# Patient Record
Sex: MALE | Race: BLACK OR AFRICAN AMERICAN | NOT HISPANIC OR LATINO | URBAN - METROPOLITAN AREA
[De-identification: names, ages, dates, MRNs, and addresses within clinical notes are randomized per-mention and may not be internally consistent; named-entity substitution may affect disease eponyms.]

---

## 2020-08-01 ENCOUNTER — INPATIENT (INPATIENT)
Facility: HOSPITAL | Age: 45
LOS: 1 days | Discharge: HOME | DRG: 645 | End: 2020-08-03
Payer: COMMERCIAL

## 2020-08-01 VITALS
SYSTOLIC BLOOD PRESSURE: 143 MMHG | HEART RATE: 100 BPM | TEMPERATURE: 98 F | RESPIRATION RATE: 20 BRPM | DIASTOLIC BLOOD PRESSURE: 89 MMHG | OXYGEN SATURATION: 94 %

## 2020-08-01 DIAGNOSIS — F17.200 NICOTINE DEPENDENCE, UNSPECIFIED, UNCOMPLICATED: ICD-10-CM

## 2020-08-01 DIAGNOSIS — J98.59 OTHER DISEASES OF MEDIASTINUM, NOT ELSEWHERE CLASSIFIED: ICD-10-CM

## 2020-08-01 DIAGNOSIS — R06.02 SHORTNESS OF BREATH: ICD-10-CM

## 2020-08-01 DIAGNOSIS — J20.9 ACUTE BRONCHITIS, UNSPECIFIED: ICD-10-CM

## 2020-08-01 DIAGNOSIS — Z29.9 ENCOUNTER FOR PROPHYLACTIC MEASURES, UNSPECIFIED: ICD-10-CM

## 2020-08-01 DIAGNOSIS — Z98.890 OTHER SPECIFIED POSTPROCEDURAL STATES: Chronic | ICD-10-CM

## 2020-08-01 DIAGNOSIS — R91.8 OTHER NONSPECIFIC ABNORMAL FINDING OF LUNG FIELD: ICD-10-CM

## 2020-08-01 LAB
ALBUMIN SERPL ELPH-MCNC: 3.9 G/DL — SIGNIFICANT CHANGE UP (ref 3.4–5)
ALP SERPL-CCNC: 76 U/L — SIGNIFICANT CHANGE UP (ref 40–120)
ALT FLD-CCNC: 26 U/L — SIGNIFICANT CHANGE UP (ref 12–42)
ANION GAP SERPL CALC-SCNC: 8 MMOL/L — LOW (ref 9–16)
APTT BLD: 33.4 SEC — SIGNIFICANT CHANGE UP (ref 27.5–35.5)
AST SERPL-CCNC: 16 U/L — SIGNIFICANT CHANGE UP (ref 15–37)
BASOPHILS # BLD AUTO: 0.04 K/UL — SIGNIFICANT CHANGE UP (ref 0–0.2)
BASOPHILS NFR BLD AUTO: 0.5 % — SIGNIFICANT CHANGE UP (ref 0–2)
BILIRUB SERPL-MCNC: 0.3 MG/DL — SIGNIFICANT CHANGE UP (ref 0.2–1.2)
BUN SERPL-MCNC: 15 MG/DL — SIGNIFICANT CHANGE UP (ref 7–23)
CALCIUM SERPL-MCNC: 9.1 MG/DL — SIGNIFICANT CHANGE UP (ref 8.5–10.5)
CHLORIDE SERPL-SCNC: 107 MMOL/L — SIGNIFICANT CHANGE UP (ref 96–108)
CO2 SERPL-SCNC: 28 MMOL/L — SIGNIFICANT CHANGE UP (ref 22–31)
CREAT SERPL-MCNC: 1.28 MG/DL — SIGNIFICANT CHANGE UP (ref 0.5–1.3)
CRP SERPL-MCNC: 0.3 MG/DL — SIGNIFICANT CHANGE UP (ref 0–0.9)
D DIMER BLD IA.RAPID-MCNC: <187 NG/ML DDU — SIGNIFICANT CHANGE UP
EOSINOPHIL # BLD AUTO: 0.5 K/UL — SIGNIFICANT CHANGE UP (ref 0–0.5)
EOSINOPHIL NFR BLD AUTO: 6.8 % — HIGH (ref 0–6)
GLUCOSE SERPL-MCNC: 101 MG/DL — HIGH (ref 70–99)
HCT VFR BLD CALC: 44.8 % — SIGNIFICANT CHANGE UP (ref 39–50)
HGB BLD-MCNC: 15.5 G/DL — SIGNIFICANT CHANGE UP (ref 13–17)
IMM GRANULOCYTES NFR BLD AUTO: 0.3 % — SIGNIFICANT CHANGE UP (ref 0–1.5)
INR BLD: 1.11 — SIGNIFICANT CHANGE UP (ref 0.88–1.16)
LYMPHOCYTES # BLD AUTO: 3.18 K/UL — SIGNIFICANT CHANGE UP (ref 1–3.3)
LYMPHOCYTES # BLD AUTO: 43.3 % — SIGNIFICANT CHANGE UP (ref 13–44)
MCHC RBC-ENTMCNC: 29.9 PG — SIGNIFICANT CHANGE UP (ref 27–34)
MCHC RBC-ENTMCNC: 34.6 GM/DL — SIGNIFICANT CHANGE UP (ref 32–36)
MCV RBC AUTO: 86.3 FL — SIGNIFICANT CHANGE UP (ref 80–100)
MONOCYTES # BLD AUTO: 0.71 K/UL — SIGNIFICANT CHANGE UP (ref 0–0.9)
MONOCYTES NFR BLD AUTO: 9.7 % — SIGNIFICANT CHANGE UP (ref 2–14)
NEUTROPHILS # BLD AUTO: 2.9 K/UL — SIGNIFICANT CHANGE UP (ref 1.8–7.4)
NEUTROPHILS NFR BLD AUTO: 39.4 % — LOW (ref 43–77)
NRBC # BLD: 0 /100 WBCS — SIGNIFICANT CHANGE UP (ref 0–0)
NT-PROBNP SERPL-SCNC: 19 PG/ML — SIGNIFICANT CHANGE UP
PCO2 BLDV: 43 MMHG — SIGNIFICANT CHANGE UP (ref 41–51)
PH BLDV: 7.4 — SIGNIFICANT CHANGE UP (ref 7.32–7.43)
PLATELET # BLD AUTO: 256 K/UL — SIGNIFICANT CHANGE UP (ref 150–400)
PO2 BLDV: 39 MMHG — SIGNIFICANT CHANGE UP (ref 35–40)
POTASSIUM SERPL-MCNC: 4.3 MMOL/L — SIGNIFICANT CHANGE UP (ref 3.5–5.3)
POTASSIUM SERPL-SCNC: 4.3 MMOL/L — SIGNIFICANT CHANGE UP (ref 3.5–5.3)
PROT SERPL-MCNC: 7.3 G/DL — SIGNIFICANT CHANGE UP (ref 6.4–8.2)
PROTHROM AB SERPL-ACNC: 13 SEC — SIGNIFICANT CHANGE UP (ref 10.6–13.6)
RBC # BLD: 5.19 M/UL — SIGNIFICANT CHANGE UP (ref 4.2–5.8)
RBC # FLD: 14 % — SIGNIFICANT CHANGE UP (ref 10.3–14.5)
SAO2 % BLDV: 75 % — SIGNIFICANT CHANGE UP
SARS-COV-2 RNA SPEC QL NAA+PROBE: SIGNIFICANT CHANGE UP
SODIUM SERPL-SCNC: 143 MMOL/L — SIGNIFICANT CHANGE UP (ref 132–145)
TROPONIN I SERPL-MCNC: <0.017 NG/ML — LOW (ref 0.02–0.06)
WBC # BLD: 7.35 K/UL — SIGNIFICANT CHANGE UP (ref 3.8–10.5)
WBC # FLD AUTO: 7.35 K/UL — SIGNIFICANT CHANGE UP (ref 3.8–10.5)

## 2020-08-01 PROCEDURE — 71260 CT THORAX DX C+: CPT | Mod: 26

## 2020-08-01 PROCEDURE — 99223 1ST HOSP IP/OBS HIGH 75: CPT

## 2020-08-01 PROCEDURE — 71045 X-RAY EXAM CHEST 1 VIEW: CPT | Mod: 26

## 2020-08-01 PROCEDURE — 93010 ELECTROCARDIOGRAM REPORT: CPT

## 2020-08-01 PROCEDURE — 99223 1ST HOSP IP/OBS HIGH 75: CPT | Mod: GC

## 2020-08-01 PROCEDURE — 99254 IP/OBS CNSLTJ NEW/EST MOD 60: CPT

## 2020-08-01 PROCEDURE — 71250 CT THORAX DX C-: CPT | Mod: 26

## 2020-08-01 PROCEDURE — 99285 EMERGENCY DEPT VISIT HI MDM: CPT

## 2020-08-01 PROCEDURE — 99254 IP/OBS CNSLTJ NEW/EST MOD 60: CPT | Mod: GC

## 2020-08-01 RX ORDER — PANTOPRAZOLE SODIUM 20 MG/1
40 TABLET, DELAYED RELEASE ORAL
Refills: 0 | Status: DISCONTINUED | OUTPATIENT
Start: 2020-08-01 | End: 2020-08-03

## 2020-08-01 RX ORDER — CEFTRIAXONE 500 MG/1
1000 INJECTION, POWDER, FOR SOLUTION INTRAMUSCULAR; INTRAVENOUS ONCE
Refills: 0 | Status: COMPLETED | OUTPATIENT
Start: 2020-08-01 | End: 2020-08-01

## 2020-08-01 RX ORDER — IPRATROPIUM/ALBUTEROL SULFATE 18-103MCG
3 AEROSOL WITH ADAPTER (GRAM) INHALATION EVERY 6 HOURS
Refills: 0 | Status: DISCONTINUED | OUTPATIENT
Start: 2020-08-01 | End: 2020-08-03

## 2020-08-01 RX ORDER — AZITHROMYCIN 500 MG/1
500 TABLET, FILM COATED ORAL ONCE
Refills: 0 | Status: COMPLETED | OUTPATIENT
Start: 2020-08-01 | End: 2020-08-01

## 2020-08-01 RX ORDER — IPRATROPIUM/ALBUTEROL SULFATE 18-103MCG
3 AEROSOL WITH ADAPTER (GRAM) INHALATION ONCE
Refills: 0 | Status: COMPLETED | OUTPATIENT
Start: 2020-08-01 | End: 2020-08-01

## 2020-08-01 RX ADMIN — CEFTRIAXONE 100 MILLIGRAM(S): 500 INJECTION, POWDER, FOR SOLUTION INTRAMUSCULAR; INTRAVENOUS at 02:52

## 2020-08-01 RX ADMIN — Medication 3 MILLILITER(S): at 00:55

## 2020-08-01 RX ADMIN — Medication 125 MILLIGRAM(S): at 00:55

## 2020-08-01 RX ADMIN — AZITHROMYCIN 255 MILLIGRAM(S): 500 TABLET, FILM COATED ORAL at 02:51

## 2020-08-01 NOTE — CONSULT NOTE ADULT - ASSESSMENT
44 yr old male with PMHx of PUD, chronic smoker (30 pack yrs) admitted for acute bronchitis with dry cough/wheezing/dyspnea, improved with 125 solumedrol in ER and subsequent nebulization. We have been consulted for incidental finding of right paratracheal mass.

## 2020-08-01 NOTE — ED PROVIDER NOTE - PHYSICAL EXAMINATION
Vital signs reviewed and are as documented.  GENERAL: no acute distress, no conversational dyspnea, no lethargy  HEAD: Atraumatic  ENT: performed visually from a distance to limit exposure risk to COVID 19; no drooling, no muffled voice, no trismus  NECK: trachea midline, no visible masses, no visible JVD  CARDIO: auscultation deferred to limit exposure risk to COVID 19, HR as documented in vital signs  RESPIRATORY: no conversational dyspnea, No respiratory distress.  No visible increased work of breathing,  auscultation deferred to limit exposure risk to COVID 19  EXTREMITIES: moves all extremities spontaneously  NEURO: Awake and alert.  No gross motor deficits.  Ambulates independently.  PSYCH: calm, cooperative

## 2020-08-01 NOTE — H&P ADULT - PROBLEM SELECTOR PLAN 3
F:  E: replete PRN  N: regular   Dvt ppx:   GI ppx: None   DISPO: [facility]   Code status: Full F:  E: replete PRN  N: regular   Dvt ppx: SCD  GI ppx: None   DISPO: [facility]   Code status: Full Pt has extensive smoking history, found to have mild centrilobular emphysema on CT scan in setting of SOB. Pt never been diagnosed with COPD, COPD exacerbation is unlikely. Additionally, multiple lung nodules found on CT.   -outpatient followup with pulm

## 2020-08-01 NOTE — CONSULT NOTE ADULT - PROBLEM SELECTOR RECOMMENDATION 2
smoking cessation counselling done.   - early paraseptal emphysema changes seen on CT chest.   - outpatient PFTs.

## 2020-08-01 NOTE — H&P ADULT - PROBLEM SELECTOR PLAN 1
CT chest showed large R sided mediastinal mass causing leftward deviation of the trachea, without tracheal stenosis. Pt has had no trouble swallowing. In setting of extensive smoking history, mass is concerning for neoplastic process  -CT surg consulted, who recommended EGD   -GI consult for possible EGD with EUS to see if mass arises from esophagus CT chest showed large R sided mediastinal mass causing leftward deviation of the trachea, without tracheal stenosis. Pt has had no trouble swallowing. In setting of extensive smoking history, mass is concerning for neoplastic process  -CT surg consulted, who recommended EGD   -GI consulted to see if mass arises from esophagus, will see pt, recs appreciated

## 2020-08-01 NOTE — H&P ADULT - NSHPLABSRESULTS_GEN_ALL_CORE
LABS:                         15.5   7.35  )-----------( 256      ( 01 Aug 2020 01:07 )             44.8     08-01    143  |  107  |  15  ----------------------------<  101<H>  4.3   |  28  |  1.28    Ca    9.1      01 Aug 2020 01:07    TPro  7.3  /  Alb  3.9  /  TBili  0.3  /  DBili  x   /  AST  16  /  ALT  26  /  AlkPhos  76  08-01    PT/INR - ( 01 Aug 2020 01:07 )   PT: 13.0 sec;   INR: 1.11          PTT - ( 01 Aug 2020 01:07 )  PTT:33.4 sec    CARDIAC MARKERS ( 01 Aug 2020 01:07 )  <0.017 ng/mL / x     / x     / x     / x          Serum Pro-Brain Natriuretic Peptide: 19 pg/mL (08-01 @ 01:07)        RADIOLOGY, EKG & ADDITIONAL TESTS: Reviewed.

## 2020-08-01 NOTE — ED PROVIDER NOTE - ATTENDING CONTRIBUTION TO CARE
Pt is a 45yo M with a h/o PUD who p/w 1 week of cough (non-productive) and SOB.  No relief with OTC medication.  Treated by outside  for Bronchitis - no relief.  SOB/DORANTES persists.   +Active smoker.  No recent travel or sick contacts.   ROS - Denies fever, chills, headache, dizziness, sore throat, syncope, CP, palpitations, hemoptysis, calf pain or LE swelling  PE - agree with PA exam as above.   CXR - concerning for mass vs consolidation to RLL - will obtain CT to better delineate.   Dimer negative, making PE less likely.   A/P - CT demonstrates mediastinal mass.  Concerning for oncological process.  Will admit to medicine at [facility] for further work up and oxygen therapy for persistent hypoxia.

## 2020-08-01 NOTE — H&P ADULT - PROBLEM SELECTOR PLAN 4
F:  E: replete PRN  N: regular   Dvt ppx: SCD  GI ppx: None   DISPO: [facility]   Code status: Full F:  E: replete PRN  N: DASH/TLC    Dvt ppx: SCD  GI ppx: protonix 40qd  DISPO: [facility]   Code status: Full Pt has history of extensive smoking, though now smokes 3-4 cigarettes a day. At this time, does not want nicotine patch.

## 2020-08-01 NOTE — CONSULT NOTE ADULT - SUBJECTIVE AND OBJECTIVE BOX
HPI:  44M smoker w/ hx of PUD p/w SOB.  Patient reports that he had 2 weeks prior to admission, he began to have trouble breathing at night.  No inciting factor or trauma.  As symptoms persisted, he was evaluated at urgent care and started on medications for bronchitis and stomach medication for the nocturnal chest burning and tightness.  Symptoms improved but on the day of presentation, he felt like symptoms worsen with dyspnea.  He went to the ED and CT was notable for solid mass in upper R mediastinum measuring up to 6.9 cm causing mild mass effect and leftward deviation of trachea without evidence of tracheal stenosis. Mild bilateral bronchial wall thickening suggesting bronchitis. Mild paraseptal and centrilobular emphysema. Small lung nodules.   This morning, patient feels well and no further episode of dyspnea.  Denies fever, chill, cp, sob, abd pain, nausea, vomiting, change in BM, melena or hematochezia.  Hx of hematochezia s/p EGD colonoscopy around 20 yr ago and thought to have PUD.  he reports taking medication for 2 yr before stopping.  Denies fam hx of cancer.  Smokes 1PPD, with 30 pack year history.    Allergies    No Known Drug Allergies  shellfish (Anaphylaxis)    Intolerances    Home Medications:    MEDICATIONS:  MEDICATIONS  (STANDING):    MEDICATIONS  (PRN):  albuterol/ipratropium for Nebulization 3 milliLiter(s) Nebulizer every 6 hours PRN Shortness of Breath    PAST MEDICAL & SURGICAL HISTORY:  PUD (peptic ulcer disease)  Status post labral repair of shoulder    FAMILY HISTORY:  No pertinent family history in first degree relatives  denies fam hx of cancer     SOCIAL HISTORY:  Tobacco: 30 pack year, 1PPD  Alcohol:1 beer qmo  Illicit Drugs:denies    REVIEW OF SYSTEMS:  All other 10 review of systems is negative except as indicated in HPI    Vital Signs Last 24 Hrs  T(C): 36.4 (01 Aug 2020 07:51), Max: 37 (01 Aug 2020 06:49)  T(F): 97.5 (01 Aug 2020 07:51), Max: 98.6 (01 Aug 2020 06:49)  HR: 70 (01 Aug 2020 07:51) (70 - 100)  BP: 151/93 (01 Aug 2020 07:51) (142/90 - 154/90)  BP(mean): --  RR: 18 (01 Aug 2020 07:51) (16 - 20)  SpO2: 95% (01 Aug 2020 07:51) (94% - 98%)      PHYSICAL EXAM:    General: Well developed; well nourished; in no acute distress  Eyes: moist conjunctivae, sclerae anicteric  HENT: Moist mucous membranes, tongue midline  Neck: Trachea midline, supple  Lungs: Normal respiratory effort and no intercostal retractions  Cardiovascular: RRR, S1S2  Abdomen: Soft, non-tender non-distended; Normal bowel sounds; No rebound or guarding  Extremities: Normal range of motion, No clubbing, cyanosis or edema  Neurological: Alert and oriented x3, nonfocal exam  Skin: Warm and dry. No obvious rash    LABS:                        15.5   7.35  )-----------( 256      ( 01 Aug 2020 01:07 )             44.8     08-01    143  |  107  |  15  ----------------------------<  101<H>  4.3   |  28  |  1.28    Ca    9.1      01 Aug 2020 01:07    TPro  7.3  /  Alb  3.9  /  TBili  0.3  /  DBili  x   /  AST  16  /  ALT  26  /  AlkPhos  76  08-01        PT/INR - ( 01 Aug 2020 01:07 )   PT: 13.0 sec;   INR: 1.11          PTT - ( 01 Aug 2020 01:07 )  PTT:33.4 sec    RADIOLOGY & ADDITIONAL STUDIES:   Reviewed

## 2020-08-01 NOTE — CONSULT NOTE ADULT - SUBJECTIVE AND OBJECTIVE BOX
PULMONARY SERVICE INITIAL CONSULT NOTE    HPI:  44M 30+ pack-year smoker [facility] peptic ulcer disease presents with shortness of breath and dry cough for 1-2 weeks. 2 weeks prior to admission, he began to have trouble breathing at night. Mucinex and Nyquil provided mild relief. Symptoms persisted, and he developed a dry cough 1 week prior to admission. Went to urgent care and was treated for bronchitis with cough syrup. Dry cough and SOB persisted. 1 day prior to admission, his SOB and dry cough worsened, so he brought himself to hospital.    + fatigue, loss of appetite x 1 week. Denies dysphagia. Denies fever/chills, headache, dizziness, hemoptysis. No weight loss. No CP, n/v/d, dysuria, rash, LE swelling.     ED vitals: T 98, , /89, RR 20 94% on RA   ED labs s/f: WBC 7.3, D dimer <187, trops neg, CMP wnl. VBG pH 7.4, pCO2 43, pO2 39  ED imaging: CT chest - solid mass in upper R mediastinum measuring up to 6.9 cm causing mild mass effect and leftward deviation of trachea without evidence of tracheal stenosis. Mild bilateral bronchial wall thickening suggesting bronchitis. Mild paraseptal and centrilobular emphysema. Small lung nodules.   In ED, received: 500 mg azithro, 1g ceftriaxone, duoneb x 1, 125 mg IV solumedrol. CT surg consulted. (01 Aug 2020 07:39)      REVIEW OF SYSTEMS:  Constitutional: No fever, weight loss or fatigue  Eyes: No eye pain, visual disturbances, or discharge  ENMT:  No difficulty hearing, tinnitus, vertigo; No sinus or throat pain  Neck: No pain, stiffness or neck swelling  Respiratory: see HPI  Cardiovascular: No chest pain, palpitations, dizziness or leg swelling  Gastrointestinal: No abdominal or epigastric pain. No nausea, vomiting or hematemesis; No diarrhea or constipation. No melena or hematochezia.  Genitourinary: No dysuria, frequency, hematuria or incontinence  Neurological: No headaches, memory loss, loss of strength, numbness or tremors  Skin: No itching, burning, rashes or lesions   Lymph Nodes: No enlarged glands  Endocrine: No heat or cold intolerance; No hair loss  Musculoskeletal: No joint pain or swelling; No muscle, back or extremity pain  Psychiatric: No depression, anxiety, mood swings or difficulty sleeping  Heme/Lymph: No easy bruising or bleeding gums  Allergy and Immunologic: No hives or eczema    PAST MEDICAL & SURGICAL HISTORY:  PUD (peptic ulcer disease)  Status post labral repair of shoulder      FAMILY HISTORY:  No pertinent family history in first degree relatives      SOCIAL HISTORY:  Smoking Status: [ ] Current, [ ] Former, [ ] Never  Pack Years:    MEDICATIONS:  Pulmonary:  albuterol/ipratropium for Nebulization 3 milliLiter(s) Nebulizer every 6 hours PRN    Antimicrobials:    Anticoagulants:    Onc:    GI/:  pantoprazole    Tablet 40 milliGRAM(s) Oral before breakfast    Endocrine:    Cardiac:    Other Medications:      Allergies    No Known Drug Allergies  shellfish (Anaphylaxis)    Intolerances        Vital Signs Last 24 Hrs  T(C): 36.9 (01 Aug 2020 16:33), Max: 37 (01 Aug 2020 06:49)  T(F): 98.4 (01 Aug 2020 16:33), Max: 98.6 (01 Aug 2020 06:49)  HR: 71 (01 Aug 2020 16:33) (70 - 100)  BP: 153/88 (01 Aug 2020 16:33) (142/90 - 154/90)  BP(mean): --  RR: 18 (01 Aug 2020 16:33) (16 - 20)  SpO2: 94% (01 Aug 2020 16:33) (94% - 98%)        PHYSICAL EXAM:  Constitutional: WDWN  Head: NC/AT  EENT: PERRL, anicteric sclera; oropharynx clear, MMM  Neck: supple, no appreciable JVD  Respiratory: CTA B/L; no W/R/R  Cardiovascular: +S1/S2, RRR  Gastrointestinal: soft, NT/ND; +BSx4  Extremities: WWP; no edema, clubbing or cyanosis  Vascular: 2+ radial, DP/PT pulses B/L  Neurological: AAOx3; no focal deficits    LABS:      CBC Full  -  ( 01 Aug 2020 01:07 )  WBC Count : 7.35 K/uL  RBC Count : 5.19 M/uL  Hemoglobin : 15.5 g/dL  Hematocrit : 44.8 %  Platelet Count - Automated : 256 K/uL  Mean Cell Volume : 86.3 fl  Mean Cell Hemoglobin : 29.9 pg  Mean Cell Hemoglobin Concentration : 34.6 gm/dL  Auto Neutrophil # : 2.90 K/uL  Auto Lymphocyte # : 3.18 K/uL  Auto Monocyte # : 0.71 K/uL  Auto Eosinophil # : 0.50 K/uL  Auto Basophil # : 0.04 K/uL  Auto Neutrophil % : 39.4 %  Auto Lymphocyte % : 43.3 %  Auto Monocyte % : 9.7 %  Auto Eosinophil % : 6.8 %  Auto Basophil % : 0.5 %    08-01    143  |  107  |  15  ----------------------------<  101<H>  4.3   |  28  |  1.28    Ca    9.1      01 Aug 2020 01:07    TPro  7.3  /  Alb  3.9  /  TBili  0.3  /  DBili  x   /  AST  16  /  ALT  26  /  AlkPhos  76  08-01    PT/INR - ( 01 Aug 2020 01:07 )   PT: 13.0 sec;   INR: 1.11          PTT - ( 01 Aug 2020 01:07 )  PTT:33.4 sec                  RADIOLOGY & ADDITIONAL STUDIES: PULMONARY SERVICE INITIAL CONSULT NOTE    HPI:  44M 30+ pack-year smoker [facility] peptic ulcer disease presents with shortness of breath and dry cough for 1-2 weeks. 2 weeks prior to admission, he began to have trouble breathing at night. Mucinex and Nyquil provided mild relief. Symptoms persisted, and he developed a dry cough 1 week prior to admission. Went to urgent care and was treated for bronchitis with cough syrup. Dry cough and SOB persisted. 1 day prior to admission, his SOB and dry cough worsened, so he brought himself to hospital.  + fatigue, loss of appetite x 1 week. Denies dysphagia. Denies fever/chills, headache, dizziness, hemoptysis. No weight loss. No CP, n/v/d, dysuria, rash, LE swelling.     ED vitals: T 98, , /89, RR 20 94% on RA   ED labs s/f: WBC 7.3, D dimer <187, trops neg, CMP wnl. VBG pH 7.4, pCO2 43, pO2 39    On my evaluation, the patient reports to be significantly better after nebulizer treatments. wheezing has completely resolved and no more dyspnea. he still has mild dry cough. he denies any fever/chills/dysphagia/weight loss. saturating well on RA.       REVIEW OF SYSTEMS:  Constitutional: No fever, weight loss or fatigue  Eyes: No eye pain, visual disturbances, or discharge  ENMT:  No difficulty hearing, tinnitus, vertigo; No sinus or throat pain  Neck: No pain, stiffness or neck swelling  Respiratory: see HPI  Cardiovascular: No chest pain, palpitations, dizziness or leg swelling  Gastrointestinal: No abdominal or epigastric pain. No nausea, vomiting or hematemesis; No diarrhea or constipation. No melena or hematochezia.  Genitourinary: No dysuria, frequency, hematuria or incontinence  Neurological: No headaches, memory loss, loss of strength, numbness or tremors  Skin: No itching, burning, rashes or lesions   Lymph Nodes: No enlarged glands  Endocrine: No heat or cold intolerance; No hair loss  Musculoskeletal: No joint pain or swelling; No muscle, back or extremity pain  Psychiatric: No depression, anxiety, mood swings or difficulty sleeping  Heme/Lymph: No easy bruising or bleeding gums  Allergy and Immunologic: No hives or eczema    PAST MEDICAL & SURGICAL HISTORY:  PUD (peptic ulcer disease)  Status post labral repair of shoulder      FAMILY HISTORY:  No pertinent family history in first degree relatives      SOCIAL HISTORY:  Smoking Status: [x] Current, [ ] Former, [ ] Never  Pack Years: 30 pack yrs    MEDICATIONS:  Pulmonary:  albuterol/ipratropium for Nebulization 3 milliLiter(s) Nebulizer every 6 hours PRN    Antimicrobials:    Anticoagulants:    Onc:    GI/:  pantoprazole    Tablet 40 milliGRAM(s) Oral before breakfast    Endocrine:    Cardiac:    Other Medications:      Allergies    No Known Drug Allergies  shellfish (Anaphylaxis)    Intolerances        Vital Signs Last 24 Hrs  T(C): 36.9 (01 Aug 2020 16:33), Max: 37 (01 Aug 2020 06:49)  T(F): 98.4 (01 Aug 2020 16:33), Max: 98.6 (01 Aug 2020 06:49)  HR: 71 (01 Aug 2020 16:33) (70 - 100)  BP: 153/88 (01 Aug 2020 16:33) (142/90 - 154/90)  BP(mean): --  RR: 18 (01 Aug 2020 16:33) (16 - 20)  SpO2: 94% (01 Aug 2020 16:33) (94% - 98%)        PHYSICAL EXAM:  Constitutional: WDWN  Head: NC/AT  EENT: PERRL, anicteric sclera; oropharynx clear, MMM  Neck: supple, no appreciable JVD  Respiratory: CTA B/L; no W/R/R  Cardiovascular: +S1/S2, RRR  Gastrointestinal: soft, NT/ND; +BSx4  Extremities: WWP; no edema, clubbing or cyanosis  Vascular: 2+ radial, DP/PT pulses B/L  Neurological: AAOx3; no focal deficits    LABS:      CBC Full  -  ( 01 Aug 2020 01:07 )  WBC Count : 7.35 K/uL  RBC Count : 5.19 M/uL  Hemoglobin : 15.5 g/dL  Hematocrit : 44.8 %  Platelet Count - Automated : 256 K/uL  Mean Cell Volume : 86.3 fl  Mean Cell Hemoglobin : 29.9 pg  Mean Cell Hemoglobin Concentration : 34.6 gm/dL  Auto Neutrophil # : 2.90 K/uL  Auto Lymphocyte # : 3.18 K/uL  Auto Monocyte # : 0.71 K/uL  Auto Eosinophil # : 0.50 K/uL  Auto Basophil # : 0.04 K/uL  Auto Neutrophil % : 39.4 %  Auto Lymphocyte % : 43.3 %  Auto Monocyte % : 9.7 %  Auto Eosinophil % : 6.8 %  Auto Basophil % : 0.5 %    08-01    143  |  107  |  15  ----------------------------<  101<H>  4.3   |  28  |  1.28    Ca    9.1      01 Aug 2020 01:07    TPro  7.3  /  Alb  3.9  /  TBili  0.3  /  DBili  x   /  AST  16  /  ALT  26  /  AlkPhos  76  08-01    PT/INR - ( 01 Aug 2020 01:07 )   PT: 13.0 sec;   INR: 1.11          PTT - ( 01 Aug 2020 01:07 )  PTT:33.4 sec                  RADIOLOGY & ADDITIONAL STUDIES:

## 2020-08-01 NOTE — CONSULT NOTE ADULT - ASSESSMENT
44M smoker w/ hx of PUD p/w SOB found to have right mediastinum mass.  GI consulted for diagnostic EGD and EUS.    #Right mediastinum mass  Patient with new lesion found in the setting of dyspnea and smoking hx.  No evidence of thoracic lymphadenopathy.  Ddx is broad and would proceed with diagnostic EGD/EUS on Monday to further evaluate lesion.  -Keep NPO on Sunday MN  -Tentative plan for EGD/EUS on Monday    Case d/w biliary attg 44M smoker w/ hx of PUD p/w SOB found to have right mediastinum mass.  GI consulted for diagnostic EGD and EUS.    #Right mediastinal mass  Patient with new lesion found in the setting of dyspnea and smoking hx.  No evidence of thoracic lymphadenopathy.  Ddx is broad and includes inflammatory and malignancy.  Will plan for diagnostic EGD/EUS to further evaluate lesion.  -Keep NPO on Sunday MN  -Tentative plan for EGD/EUS next week    Case d/w biliary attg 44M smoker w/ hx of PUD p/w SOB found to have right mediastinum mass.  GI consulted for diagnostic EGD and EUS.    #Right mediastinal mass  Patient with new lesion found in the setting of dyspnea and smoking hx.  No evidence of thoracic lymphadenopathy.  Ddx is broad and includes inflammatory and malignancy.  Will plan for diagnostic EGD/EUS to further evaluate lesion.  -Keep NPO on Sunday MN  -Tentative plan for EGD/EUS next week    Case d/w svc attg and biliary attg

## 2020-08-01 NOTE — ED ADULT NURSE NOTE - OBJECTIVE STATEMENT
45 yo M c/o SOB and chest tightness. Pt states the exacerbation of symptoms started around 22:00; he has had dry cough and intermittent SOB with chest tightness x 1 week. Recently treated for bronchitis. Denies CP, SOB, N/V/D, headache, dizziness, fever/chills, numbness/tingling, change in bowel or bladder habits. Pt speaking in full complete sentences, ambulatory with steady gait.

## 2020-08-01 NOTE — CONSULT NOTE ADULT - SUBJECTIVE AND OBJECTIVE BOX
This patient was not seen or examined.     I was contacted by a [facility] ED provider Mr. Matt regarding Mr. Bear. In short, he is 45 yo M who presented to the ED with shortness of breath going on for about 1 week. An x-ray was obtained which was abnormal, showing a large mass in the right paratracheal region. CT was obtained showing a right paratracheal/middle mediastinal mass that extends into the neck. His trachea is wide open and there is some slight deviation to the left. His labs are normal and his COVID test is negative.    He has a history of PUD and smoking. I was being consulted for transfer and admission of this patient. This patient was not seen or examined.     I was contacted by a [facility] ED provider Mr. Leavitt regarding Mr. Bear. In short, he is 45 yo M who presented to the ED with shortness of breath for about 1 week. An x-ray was obtained which was abnormal, showing a large mass in the right paratracheal region. CT was obtained showing a right paratracheal/middle mediastinal mass that extends into the neck. His trachea is wide open and there is some slight deviation to the left. He has no other complaints. His labs are normal and his COVID test is negative.    He has a history of PUD and smoking. I was being consulted for transfer and admission of this patient.

## 2020-08-01 NOTE — H&P ADULT - NSHPPHYSICALEXAM_GEN_ALL_CORE
Vital Signs Last 12 Hrs  T(F): 97.5 (08-01-20 @ 07:51), Max: 98.6 (08-01-20 @ 06:49)  HR: 70 (08-01-20 @ 07:51) (70 - 100)  BP: 151/93 (08-01-20 @ 07:51) (142/90 - 154/90)  BP(mean): --  RR: 18 (08-01-20 @ 07:51) (16 - 20)  SpO2: 95% (08-01-20 @ 07:51) (94% - 98%)    PHYSICAL EXAM:  Constitutional: NAD, comfortable in bed.  HEENT: NC/AT, PERRLA, EOMI, no conjunctival pallor or scleral icterus, MMM. No muffled voice or trismus  Neck: Supple, no JVD. Trachea midline, no enlarged lymph nodes palpated, no mass palpated  Respiratory: CTA B/L. No w/r/r.   Cardiovascular: RRR, normal S1 and S2, no m/r/g.   Gastrointestinal: +BS, soft NTND, no guarding or rebound tenderness, no palpable masses   Extremities: wwp; no cyanosis, clubbing or edema.   Vascular: Pulses equal and strong throughout.   Neurological: AAOx3, no CN deficits, strength and sensation intact throughout.   Skin: No gross skin abnormalities or rashes

## 2020-08-01 NOTE — CONSULT NOTE ADULT - ATTENDING COMMENTS
Seen and examined by me. Feels well now - the positional dyspnea and wheezing have resolved. Awaiting EUS and biopsy on Monday.

## 2020-08-01 NOTE — H&P ADULT - NSHPSOCIALHISTORY_GEN_ALL_CORE
Currently smokes 1/2 pack a day for 10 years. Used to smoke 2 pk/day for 15 years. Daily marijuana user. Denies IVDU, other drug use. Currently smokes 3-4 cigarrettes for 10 years. Used to smoke 2 pk/day for 15 years. Daily marijuana user. Denies IVDU, other drug use. Lives in apartment with roommate.

## 2020-08-01 NOTE — H&P ADULT - PROBLEM SELECTOR PLAN 5
F:  E: replete PRN  N: DASH/TLC    Dvt ppx: SCD  GI ppx: protonix 40qd  DISPO: [facility]   Code status: Full

## 2020-08-01 NOTE — ED PROVIDER NOTE - OBJECTIVE STATEMENT
43 y/o M with PMH of PUD presents to the ED with 1 week of a dry cough and SOB. His symptoms have been persistent despite OTC cold medication so he went to Urgent Care 3 days ago where he was diagnosed with presumed bronchitis (no CXR) and prescribed Bromfed for his symptoms. He has been taking the Bromfed as prescribed, but still has persistent SOB so he came here for further evaluation. He smokes a half pack of cigarettes per day. He is unaware of any recent exposure to any COVID + individuals.    Denies fever, chills, headache, dizziness, sore throat, syncope, CP, palpitations, hemoptysis, calf pain or LE swelling

## 2020-08-01 NOTE — H&P ADULT - ASSESSMENT
44M extensive smoking history presents with persistent SOB and dry cough for 1 week, found to have large mass in R paratracheal region, currently not SOB or hypoxic.

## 2020-08-01 NOTE — CONSULT NOTE ADULT - PROBLEM SELECTOR RECOMMENDATION 3
- patient's episode of wheezing/dyspnea/dry cough was likely acute bronchitis and is resolved now. not related to mediastinal mass.   - cont PRN nebulizers. hold off on any inhalers till PFTs done outpatient.     We will continue to follow peripherally. Please call pulmonary fellow pager if acute dyspnea develops.     The patient was s/e/d with Dr Boyd.

## 2020-08-01 NOTE — CONSULT NOTE ADULT - ASSESSMENT
Mr. Bear is a 45 yo M presented with shortness of breath and a right paratracheal mass that extends into his middle mediastinum. Given its location it may be a duplication cyst of the esophagus or airway. Given his smoking history is may be a primary malignancy or metastatic lesion. It may be an enlarged lymph node from an inflammatory process. He has multiple small subcentimeter nodules in his lungs bilaterally which may or may not be lesions related to this primary mass. Unfortunately, I could not review any previous imaging.     Prior to any surgical resection, this mass needs to be characterized and if it is a malignancy, he needs to be appropriately staged. My first test would be an EGD with EUS to see if it does originate from the esophagus. If it appears to be a duplication cyst of the airway or esophagus, I would not biopsy it. That could lead to infection or a more difficult surgical resection. If it appears to be a malignancy, we will need a tissue diagnosis and staging to rule out metastatic disease in the setting of this mediastinal mass and noncalcified lung nodules. Therefore, it is unclear if and when he will need surgery. A surgical admission at this time is not warranted if admission is necessary but we would be happy to follow along as a consulting service. If he does not need admission to the hospital, he should be set up with an outpatient appointment in our clinic [[facility] [phone]].     Thank you for the consult and including me in the care of this patient.     As this was a phone call, the patient was not seen or examined.     Rizwan Rey MD  Attending Thoracic Surgeon  [facility] Mr. Bear is a 43 yo M presented with shortness of breath and a right paratracheal mass that extends into his middle mediastinum. He has a significant smoking history. The differential diagnosis for the mass is broad including but not limited to a duplication cyst of the esophagus or airway, a primary malignancy, a metastatic lesion, a lymph node or a parthyroid lesion. Given the location, clinical history and diagnostic tests so far, some diagnoses are more likely than others.     He has multiple small subcentimeter nodules in his lungs bilaterally which may or may not be lesions related to this primary mass. Unfortunately, I could not review any previous imaging.     Prior to any surgical resection, this mass needs to be characterized and if it is a malignancy, he needs to be appropriately staged. My first test would be an EGD with EUS to see if it does originate from the esophagus. If it appears to be a duplication cyst of the airway or esophagus, I would not biopsy it. That could lead to infection or a more difficult surgical resection. An MRI would be helpful as well to see its relationship to other structures. He may need a bronchoscopy as well based on the results of these tests.     If it appears to be a malignancy, we will need a PET scan to rule out metastatic disease in the setting of this mediastinal mass and noncalcified lung nodules. Therefore, it is unclear if and when he will need surgery. A surgical admission at this time is not warranted, but if admission is necessary, I would recommend admission to a medical service and we will follow along as a consulting service. If he does not need admission to the hospital, he should be set up with an outpatient appointment in our clinic [[facility] [phone]].     Thank you for the consult and including me in the care of this patient.     As this was a phone call, the patient was not seen or examined.     Rizwan Rey MD  Attending Thoracic Surgeon  [facility] Mr. Bear is a 45 yo M presented with shortness of breath and a right paratracheal mass that extends into his middle mediastinum. He has a significant smoking history. The differential diagnosis for the mass is broad including but not limited to a duplication cyst of the esophagus or airway, a primary malignancy, a metastatic lesion, a lymph node or a parthyroid lesion. Given the location, clinical history and diagnostic tests so far, some diagnoses are more likely than others.     He has multiple small subcentimeter nodules in his lungs bilaterally which may or may not be lesions related to this primary mass. Unfortunately, I could not review any previous imaging.     Prior to any surgical resection, this mass needs to be characterized and if it is a malignancy, he needs to be appropriately staged. My first tests would be an EGD with EUS to see if it does originate from the esophagus and an MRI. If it appears to be a duplication cyst of the airway or esophagus, I would not biopsy it. That could lead to infection or a more difficult surgical resection. He may need a bronchoscopy as well based on the results of these tests.     If it appears to be a malignancy, we will need a PET scan to rule out metastatic disease in the setting of this mediastinal mass and noncalcified lung nodules. Therefore, it is unclear if and when he will need surgery. A surgical admission at this time is not warranted, but if admission is necessary, I would recommend admission to a medical service and we will follow along as a consulting service. If he does not need admission to the hospital, he should be set up with an outpatient appointment in our clinic [[facility] [phone]].     Thank you for the consult and including me in the care of this patient.     As this was a phone call, the patient was not seen or examined.     Rizwan Rey MD  Attending Thoracic Surgeon  [facility]

## 2020-08-01 NOTE — ED ADULT TRIAGE NOTE - CHIEF COMPLAINT QUOTE
walk in pt with complaints of shortness of breath and chest tightness since 10pm last night. Patient endorses recent tx for bronchitis. Saturates 93% RA.

## 2020-08-01 NOTE — H&P ADULT - PROBLEM SELECTOR PLAN 2
Pt had persistent SOB and dry cough for 1 week, that worsened yesterday night. Currently speaking in full sentences, satting well on room air. Received duoneb x1 and IV solumedrol, which helped patient's breathing.  -If patient SOB worsens, can use MDI Pt had persistent SOB and dry cough for 1 week, that worsened yesterday night.  Received duoneb x1, solumedrol, azithro, ceftriaxone in ED. Currently speaking in full sentences, satting well on room air. CT chest showed bronchial wall thickening, suggestive of bronchitis. Small ground-glass opacities in both lower lobes. Pt afebrile, no leukocytosis. Though tachycardic to 100 and RR 20 upon arrival.   -f/u procalcitonin in AM. If high, consider abx  -If patient SOB worsens, can use MDI

## 2020-08-01 NOTE — CONSULT NOTE ADULT - PROBLEM SELECTOR RECOMMENDATION 9
- incidental finding of 7cm right mediastinal mass.   - mild impingement of trachea, however not a critical airway currently. no stridor/wheezing/dyspnea in any position.   - d/d include lymphoma/ medastinal cyst/airway duplication cyst/ thymoma less likely.   - CT surgery and GI on board with plans for EUS with biopsy on monday, agree with plans.

## 2020-08-01 NOTE — ED ADULT NURSE REASSESSMENT NOTE - NS ED NURSE REASSESS COMMENT FT1
Pt received from [facility]. Pt asleep in stretcher, no indicators of pain present. Breathing spontaneous and unlabored.

## 2020-08-02 LAB
ANION GAP SERPL CALC-SCNC: 12 MMOL/L — SIGNIFICANT CHANGE UP (ref 5–17)
BASOPHILS # BLD AUTO: 0.02 K/UL — SIGNIFICANT CHANGE UP (ref 0–0.2)
BASOPHILS NFR BLD AUTO: 0.2 % — SIGNIFICANT CHANGE UP (ref 0–2)
BUN SERPL-MCNC: 13 MG/DL — SIGNIFICANT CHANGE UP (ref 7–23)
CALCIUM SERPL-MCNC: 9.2 MG/DL — SIGNIFICANT CHANGE UP (ref 8.4–10.5)
CHLORIDE SERPL-SCNC: 105 MMOL/L — SIGNIFICANT CHANGE UP (ref 96–108)
CO2 SERPL-SCNC: 25 MMOL/L — SIGNIFICANT CHANGE UP (ref 22–31)
CREAT SERPL-MCNC: 1.05 MG/DL — SIGNIFICANT CHANGE UP (ref 0.5–1.3)
EOSINOPHIL # BLD AUTO: 0.19 K/UL — SIGNIFICANT CHANGE UP (ref 0–0.5)
EOSINOPHIL NFR BLD AUTO: 1.8 % — SIGNIFICANT CHANGE UP (ref 0–6)
GLUCOSE SERPL-MCNC: 82 MG/DL — SIGNIFICANT CHANGE UP (ref 70–99)
HCT VFR BLD CALC: 46.4 % — SIGNIFICANT CHANGE UP (ref 39–50)
HGB BLD-MCNC: 15.4 G/DL — SIGNIFICANT CHANGE UP (ref 13–17)
IMM GRANULOCYTES NFR BLD AUTO: 0.3 % — SIGNIFICANT CHANGE UP (ref 0–1.5)
LYMPHOCYTES # BLD AUTO: 3.33 K/UL — HIGH (ref 1–3.3)
LYMPHOCYTES # BLD AUTO: 31.5 % — SIGNIFICANT CHANGE UP (ref 13–44)
MAGNESIUM SERPL-MCNC: 1.9 MG/DL — SIGNIFICANT CHANGE UP (ref 1.6–2.6)
MCHC RBC-ENTMCNC: 29.1 PG — SIGNIFICANT CHANGE UP (ref 27–34)
MCHC RBC-ENTMCNC: 33.2 GM/DL — SIGNIFICANT CHANGE UP (ref 32–36)
MCV RBC AUTO: 87.5 FL — SIGNIFICANT CHANGE UP (ref 80–100)
MONOCYTES # BLD AUTO: 0.86 K/UL — SIGNIFICANT CHANGE UP (ref 0–0.9)
MONOCYTES NFR BLD AUTO: 8.1 % — SIGNIFICANT CHANGE UP (ref 2–14)
NEUTROPHILS # BLD AUTO: 6.14 K/UL — SIGNIFICANT CHANGE UP (ref 1.8–7.4)
NEUTROPHILS NFR BLD AUTO: 58.1 % — SIGNIFICANT CHANGE UP (ref 43–77)
NRBC # BLD: 0 /100 WBCS — SIGNIFICANT CHANGE UP (ref 0–0)
PLATELET # BLD AUTO: 259 K/UL — SIGNIFICANT CHANGE UP (ref 150–400)
POTASSIUM SERPL-MCNC: 3.9 MMOL/L — SIGNIFICANT CHANGE UP (ref 3.5–5.3)
POTASSIUM SERPL-SCNC: 3.9 MMOL/L — SIGNIFICANT CHANGE UP (ref 3.5–5.3)
RBC # BLD: 5.3 M/UL — SIGNIFICANT CHANGE UP (ref 4.2–5.8)
RBC # FLD: 14.1 % — SIGNIFICANT CHANGE UP (ref 10.3–14.5)
SODIUM SERPL-SCNC: 142 MMOL/L — SIGNIFICANT CHANGE UP (ref 135–145)
WBC # BLD: 10.57 K/UL — HIGH (ref 3.8–10.5)
WBC # FLD AUTO: 10.57 K/UL — HIGH (ref 3.8–10.5)

## 2020-08-02 PROCEDURE — 99233 SBSQ HOSP IP/OBS HIGH 50: CPT | Mod: GC

## 2020-08-02 PROCEDURE — 99223 1ST HOSP IP/OBS HIGH 75: CPT

## 2020-08-02 RX ADMIN — PANTOPRAZOLE SODIUM 40 MILLIGRAM(S): 20 TABLET, DELAYED RELEASE ORAL at 06:23

## 2020-08-02 NOTE — CONSULT NOTE ADULT - ATTENDING COMMENTS
Agree with documentation above. Discussed, seen and examined with the [facility] Surgery team this morning.     In short, Mr. Bear is a 45 yo M, current smoker with significant past smoking history who recently has developed SOB while sleeping in a supine position. He has had no voice changes. He has had no dysphagia. He has had a dry cough without hemoptysis. He has not had any recent fevers. No recent weight loss. He endorses no other problems.    He does not take medications. His only other medical problem is PUD. His only other surgery has been right shoulder arthroscopy for which he said he had preoperative x-rays. He reports no other imaging in the past of which he is aware. His family history is negative for cancer. He currently smokes and is interested in quitting.    On exam, he is in his hospital bed in no acute distress. The head of his bed at 30 degrees. No stridor. No supplemental O2. No supraclavicular nodes or axillary nodes are palpable bilaterally.   THERE IS A HARD SUBMANDIBULAR NODULE ON THE LEFT SIDE. There is nothing palpable on the right.     His WBC is slightly elevated. Other labs are normal.     CT with IV contrast reviewed. The official read reports a cystic and solid nodule that it is extending from the right lower lobe of the thyroid.     45 yo M with a right paratracheal mass that appears to be originating from the right lower lobe of the thyroid.      ASSESSMENT/RECOMMENDATIONS:  Given the recent imaging, it is less likely a duplication cyst. The CT and physical exam findings are concerning for thyroid carcinoma.     1) I do not think an EUS is needed at this time and I do not think it should be biopsied or aspirated. Additionally, I think we have gained significant information from the CT with IV contrast and an MRI is no longer needed either.   2) Based on the CT and physical exam findings, I would recommend obtaining an Iodine 123 or 131 scan and an ENT surgery consult.   3) Please obtain all old imaging that has been performed.      Please don't hesitate to contact the thoracic surgery team with questions or concerns.     Rizwan Rey MD  Attending Thoracic Surgeon Discussed, seen and examined with the [facility] Surgery team this morning. Agree with the documentation above aside from the following changes:    In short, Mr. Bear is a 45 yo M, current smoker with significant past smoking history who recently has developed SOB while sleeping in a supine position. He has had no voice changes. He has had no dysphagia. He has had a dry cough without hemoptysis. He has not had any recent fevers. No recent weight loss. He endorses no other problems.    He does not take medications. His only other medical problem is PUD. His only other surgery has been right shoulder arthroscopy for which he said he had preoperative x-rays. He reports no other imaging in the past of which he is aware. His family history is negative for cancer. He currently smokes and is interested in quitting.    On exam, he is in his hospital bed in no acute distress. The head of his bed at 30 degrees. No stridor. No supplemental O2. No supraclavicular nodes or axillary nodes are palpable bilaterally.   THERE IS A HARD SUBMANDIBULAR NODULE ON THE LEFT SIDE. There is nothing palpable on the right.     His WBC is slightly elevated. Other labs are normal.     CT with IV contrast reviewed. The official read reports a cystic and solid nodule that it is extending from the right lower lobe of the thyroid.     45 yo M with a right paratracheal mass that appears to be originating from the right lower lobe of the thyroid.      ASSESSMENT/RECOMMENDATIONS:  Given the recent imaging, it is less likely a duplication cyst. The CT and physical exam findings are concerning for thyroid carcinoma.     1) I do not think an EUS is needed at this time and I do not think it should be biopsied or aspirated. Additionally, I think we have gained significant information from the CT with IV contrast and an MRI is no longer needed either.   2) Based on the CT and physical exam findings, I would recommend obtaining an Iodine 123 or 131 scan and an ENT surgery consult.   3) Please obtain all old imaging that has been performed.      Please don't hesitate to contact the thoracic surgery team with questions or concerns.     Rizwan Rey MD  Attending Thoracic Surgeon

## 2020-08-02 NOTE — PROGRESS NOTE ADULT - ASSESSMENT
44M extensive smoking history presents with persistent SOB and dry cough for 1 week, found to have large mass in R paratracheal region, currently awaiting EGD for further characterization.    #Paratracheal Mass  -- NPO at [facility] for EGD  -- MRI as per CT Surgery     #Emphysema  -- will need outpatient PFTs  -- c/w modesto Sinclair MD  Hospitalist Attending  [phone]

## 2020-08-02 NOTE — PROGRESS NOTE ADULT - SUBJECTIVE AND OBJECTIVE BOX
INTERVAL EVENTS:  -- NAEO    SUBJECTIVE:  -- states that his SOB is improving   -- able to swallow well  -- understands what was found on the CT; has had prior EGD's for reflux - no paraesophageal masses noted at that time    MEDICATIONS:  MEDICATIONS  (STANDING):  pantoprazole    Tablet 40 milliGRAM(s) Oral before breakfast    MEDICATIONS  (PRN):  albuterol/ipratropium for Nebulization 3 milliLiter(s) Nebulizer every 6 hours PRN Shortness of Breath    Allergies  No Known Drug Allergies  shellfish (Anaphylaxis)    OBJECTIVE:  Vital Signs Last 24 Hrs  T(C): 36.7 (02 Aug 2020 05:56), Max: 36.9 (01 Aug 2020 16:33)  T(F): 98 (02 Aug 2020 05:56), Max: 98.4 (01 Aug 2020 16:33)  HR: 68 (02 Aug 2020 05:56) (68 - 91)  BP: 154/89 (02 Aug 2020 05:56) (146/90 - 154/89)  BP(mean): --  RR: 17 (02 Aug 2020 05:56) (17 - 18)  SpO2: 99% (02 Aug 2020 05:56) (94% - 99%)  I&O's Summary    PHYSICAL EXAM:  Gen: NAD, sitting upright in bed  HEENT: NCAT, MMM, clear OP  Neck: supple, trachea at midline  CV: RRR, no m/g/r appreciated  Pulm: CTA B, no w/r/r; no increase in WOB  Abd: normoactive BS, soft, NTND  Ext: WWP, no c/c/e  Neuro: AOx3, nonfocal  Psych: pleasant, conversant and appropriate    LABS:                        15.4   10.57 )-----------( 259      ( 02 Aug 2020 08:17 )             46.4     08-02    142  |  105  |  13  ----------------------------<  82  3.9   |  25  |  1.05    Ca    9.2      02 Aug 2020 08:17  Mg     1.9     08-02    TPro  7.3  /  Alb  3.9  /  TBili  0.3  /  DBili  x   /  AST  16  /  ALT  26  /  AlkPhos  76  08-01    MICRODATA:  -- Culture - Blood (collected 01 Aug 2020 08:20)  Source: .Blood Blood-Peripheral  Preliminary Report (02 Aug 2020 09:01):    No growth to date.    -- Culture - Blood (collected 01 Aug 2020 08:20)  Source: .Blood Blood-Peripheral  Preliminary Report (02 Aug 2020 09:01):    No growth to date.    RADIOLOGY/OTHER STUDIES:  -- No new imaging.

## 2020-08-02 NOTE — CONSULT NOTE ADULT - ASSESSMENT
44 year old M, 30+ pack-year smoker, PMHx peptic ulcer disease presented to [facility] on 8/1/20 with shortness of breath and dry cough for 1-2 weeks. Found to have solid mass in upper R mediastinum measuring up to 6.9 cm causing mild mass effect and leftward deviation of trachea without evidence of tracheal stenosis. Mild bilateral bronchial wall thickening suggesting bronchitis. Mild paraseptal and centrilobular emphysema. Small lung nodules. CT surg consulted and patient transferred to [facility] under medicine service for further workup.     Plan:  Problem 1: Mediastinal Mass  -Case discussed with Dr. Rey   -Pulmonary and GI also following  -Rec MRI, EGD and EUS Monday to characterize mass  -If Malignant- will need PET to r/o mets  -Surgical resection pending w/u TBD  -Will continue to follow     I have reviewed clinical labs tests and reports, radiology tests and reports, as well as old patient medical records, and discussed with the refering physician. 44 year old M, 30+ pack-year smoker, PMHx peptic ulcer disease presented to [facility] on 8/1/20 with shortness of breath and dry cough for 1-2 weeks. Found to have solid mass in upper R mediastinum measuring up to 6.9 cm causing mild mass effect and leftward deviation of trachea without evidence of tracheal stenosis. Mild bilateral bronchial wall thickening suggesting bronchitis. Mild paraseptal and centrilobular emphysema. Small lung nodules. CT surg consulted and patient transferred to [facility] under medicine service for further workup.     Plan:  Problem 1: Mediastinal Mass  -Case discussed with Dr. Rey   -Pulmonary and GI also following  -Rec MRI, EGD and EUS Monday to characterize mass  -If Malignant- will need PET to r/o mets  -Please obtain all outpatient records   -Surgical resection pending w/u TBD  -Will continue to follow       I have reviewed clinical labs tests and reports, radiology tests and reports, as well as old patient medical records, and discussed with the refering physician.

## 2020-08-02 NOTE — CONSULT NOTE ADULT - SUBJECTIVE AND OBJECTIVE BOX
Surgeon: Dr. Rey    Requesting Physician: [facility] ED    HISTORY OF PRESENT ILLNESS (Need 4):  44 year old M, 30+ pack-year smoker, PMHx peptic ulcer disease presented to [facility] on 8/1/20 with shortness of breath and dry cough for 1-2 weeks. 2 weeks prior to admission, he began to have trouble breathing at night. Mucinex and Nyquil provided mild relief. Symptoms persisted, and he developed a dry cough 1 week prior to admission. Went to urgent care and was treated for bronchitis with cough syrup. Dry cough and SOB persisted. 1 day prior to admission, his SOB and dry cough worsened, so he brought himself to hospital. Also reports fatigue, loss of appetite x 1 week. Denies dysphagia. Denies fever/chills, headache, dizziness, hemoptysis. No weight loss. No CP, n/v/d, dysuria, rash, LE swelling. In ED labs significant for  WBC 7.3, D dimer <187, trops neg, CMP wnl. VBG pH 7.4, pCO2 43, pO2 39. CT chest - solid mass in upper R mediastinum measuring up to 6.9 cm causing mild mass effect and leftward deviation of trachea without evidence of tracheal stenosis. Mild bilateral bronchial wall thickening suggesting bronchitis. Mild paraseptal and centrilobular emphysema. Small lung nodules. In ED, received: 500 mg azithro, 1g ceftriaxone, duoneb x 1, 125 mg IV solumedrol. CT surg consulted and patient transferred to [facility] under medicine service for further workup.      PAST MEDICAL & SURGICAL HISTORY:  PUD (peptic ulcer disease)  Status post labral repair of shoulder      MEDICATIONS  (STANDING):  pantoprazole    Tablet 40 milliGRAM(s) Oral before breakfast    MEDICATIONS  (PRN):  albuterol/ipratropium for Nebulization 3 milliLiter(s) Nebulizer every 6 hours PRN Shortness of Breath      Allergies    No Known Drug Allergies  shellfish (Anaphylaxis)    Intolerances        SOCIAL HISTORY:  Smoker:  3-4 cigarettes for 10 years, previous 2packs a day for 15 years.  ETOH use:  No  Ilicit Drug use:  Daily marijuana  Assisted device use (Cane / Walker): None  Live with: Roomate in apartment     Family hISTORY:  No pertinent family history in first degree relatives      Review of Systems (Need 10):  CONSTITUTIONAL: Denies fevers / chills, sweats, fatigue, weight loss, weight gain                                       NEURO:  Denies parathesias, seizures, syncope, confusion                                                                                  EYES:  Denies blurry vision, discharge, pain, loss of vision                                                                                    ENMT:  Denies difficulty hearing, vertigo, dysphagia, epistaxis, recent dental work                                       CV:  Denies chest pain, palpitations, DORANTES, orthopnea                                                                                           RESPIRATORY: +SOB, cough, Denies Wheezing, hemoptysis                                                               GI:  Denies nausea, vomiting, diarrhea, constipation, melena                                                                          : Denies hematuria, dysuria, urgency, incontinence                                                                                          MUSKULOSKELETAL:  Denies arthritis, joint swelling, muscle weakness                                                             SKIN/BREAST:  Denies rash, itching, hair loss, masses                                                                                              PSYCH:  Denies depression, anxiety, suicidal ideation                                                                                                HEME/LYMPH:  Denies bruises easily, enlarged lymph nodes, tender lymph nodes                                          ENDOCRINE:  Denies cold intolerance, heat intolerance, polydipsia                                                                      Vital Signs Last 24 Hrs  T(C): 36.7 (02 Aug 2020 05:56), Max: 37 (01 Aug 2020 06:49)  T(F): 98 (02 Aug 2020 05:56), Max: 98.6 (01 Aug 2020 06:49)  HR: 68 (02 Aug 2020 05:56) (68 - 91)  BP: 154/89 (02 Aug 2020 05:56) (142/90 - 154/89)  BP(mean): --  RR: 17 (02 Aug 2020 05:56) (16 - 18)  SpO2: 99% (02 Aug 2020 05:56) (94% - 99%)    Physical Exam (Need 8)  CONSTITUTIONAL:                                                                          WNL  NEURO:                                                                                             WNL                      EYES:                                                                                                  WNL  ENMT:                                                                                                WNL  CV:                                                                                                      WNL  RESPIRATORY:                                                                                  WNL  GI:                                                                                                       WNL  : JANSEN + / -                                                                                 WNL  MUSKULOSKELETAL:                                                                       WNL  SKIN / BREAST:                                                                                 WNL                                                          LABS:                        15.5   7.35  )-----------( 256      ( 01 Aug 2020 01:07 )             44.8     08-01    143  |  107  |  15  ----------------------------<  101<H>  4.3   |  28  |  1.28    Ca    9.1      01 Aug 2020 01:07    TPro  7.3  /  Alb  3.9  /  TBili  0.3  /  DBili  x   /  AST  16  /  ALT  26  /  AlkPhos  76  08-01    PT/INR - ( 01 Aug 2020 01:07 )   PT: 13.0 sec;   INR: 1.11          PTT - ( 01 Aug 2020 01:07 )  PTT:33.4 sec    CARDIAC MARKERS ( 01 Aug 2020 01:07 )  <0.017 ng/mL / x     / x     / x     / x              RADIOLOGY & ADDITIONAL STUDIES:  CAROTID U/S:    CXR:  < from: Xray Chest 1 View AP/PA (08.01.20 @ 01:26) >    Impression: Superior mediastinal mass noted with deviation of the trachea to the left. Correlation with CT examination recommended    < end of copied text >      CT Scan:  < from: CT Chest No Cont (08.01.20 @ 03:09) >  Impression:    1.  Solid mass in the upper right mediastinum measuring up to 6.9 cm causing mild mass effect and leftward deviation of the trachea without evidence of significant tracheal stenosis. Differential includes neoplastic and inflammatory etiologies. Recommend further evaluation with PET/CT or MRI.  2.  Multiple solid nodules in the right lung measuring up to 3 mm. Based on the 2017 Fleischner Society criteria, if the patient has a history of smokingor is otherwise at high risk for lung cancer, follow-up chest CT in 12 months may be considered to ensure stability. If the patient is at low risk for lung cancer, follow-up is not necessary.  3.  Mild paraseptal and centrilobular emphysema.    < end of copied text >      EKG:      TTE / MERCEDES:    Cardiac Cath: Surgeon: Dr. Rey    Requesting Physician: [facility] ED    HISTORY OF PRESENT ILLNESS (Need 4):  44 year old M, 30+ pack-year smoker, PMHx peptic ulcer disease presented to [facility] on 8/1/20 with shortness of breath and dry cough for 1-2 weeks. 2 weeks prior to admission, he began to have trouble breathing at night. Mucinex and Nyquil provided mild relief. Symptoms persisted, and he developed a dry cough 1 week prior to admission. Went to urgent care and was treated for bronchitis with cough syrup. Dry cough and SOB persisted. 1 day prior to admission, his SOB and dry cough worsened, so he brought himself to hospital. Also reports fatigue, loss of appetite x 1 week. Denies dysphagia. Denies fever/chills, headache, dizziness, hemoptysis. No weight loss. No CP, n/v/d, dysuria, rash, LE swelling. In ED labs significant for  WBC 7.3, D dimer <187, trops neg, CMP wnl. VBG pH 7.4, pCO2 43, pO2 39. CT chest - solid mass in upper R mediastinum measuring up to 6.9 cm causing mild mass effect and leftward deviation of trachea without evidence of tracheal stenosis. Mild bilateral bronchial wall thickening suggesting bronchitis. Mild paraseptal and centrilobular emphysema. Small lung nodules. In ED, received: 500 mg azithro, 1g ceftriaxone, duoneb x 1, 125 mg IV solumedrol. CT surg consulted and patient transferred to [facility] under medicine service for further workup. Admits to SOB and coughing. Denies any CP, palpitations,       PAST MEDICAL & SURGICAL HISTORY:  PUD (peptic ulcer disease)  Status post labral repair of shoulder      MEDICATIONS  (STANDING):  pantoprazole    Tablet 40 milliGRAM(s) Oral before breakfast    MEDICATIONS  (PRN):  albuterol/ipratropium for Nebulization 3 milliLiter(s) Nebulizer every 6 hours PRN Shortness of Breath      Allergies    No Known Drug Allergies  shellfish (Anaphylaxis)    Intolerances        SOCIAL HISTORY:  Smoker:  3-4 cigarettes for 10 years, previous 2packs a day for 15 years.  ETOH use:  No  Ilicit Drug use:  Daily marijuana  Assisted device use (Cane / Walker): None  Live with: Roomate in apartment     Family hISTORY:  No pertinent family history in first degree relatives      Review of Systems (Need 10):  CONSTITUTIONAL: Denies fevers / chills, sweats, fatigue, weight loss, weight gain                                       NEURO:  Denies parathesias, seizures, syncope, confusion                                                                                  EYES:  Denies blurry vision, discharge, pain, loss of vision                                                                                    ENMT:  Denies difficulty hearing, vertigo, dysphagia, epistaxis, recent dental work                                       CV:  Denies chest pain, palpitations, DORANTES, orthopnea                                                                                           RESPIRATORY: +SOB, cough, Denies Wheezing, hemoptysis                                                               GI:  Denies nausea, vomiting, diarrhea, constipation, melena                                                                          : Denies hematuria, dysuria, urgency, incontinence                                                                                          MUSKULOSKELETAL:  Denies arthritis, joint swelling, muscle weakness                                                             SKIN/BREAST:  Denies rash, itching, hair loss, masses                                                                                              PSYCH:  Denies depression, anxiety, suicidal ideation                                                                                                HEME/LYMPH:  Denies bruises easily, enlarged lymph nodes, tender lymph nodes                                          ENDOCRINE:  Denies cold intolerance, heat intolerance, polydipsia                                                                      Vital Signs Last 24 Hrs  T(C): 36.7 (02 Aug 2020 05:56), Max: 37 (01 Aug 2020 06:49)  T(F): 98 (02 Aug 2020 05:56), Max: 98.6 (01 Aug 2020 06:49)  HR: 68 (02 Aug 2020 05:56) (68 - 91)  BP: 154/89 (02 Aug 2020 05:56) (142/90 - 154/89)  BP(mean): --  RR: 17 (02 Aug 2020 05:56) (16 - 18)  SpO2: 99% (02 Aug 2020 05:56) (94% - 99%)    Physical Exam (Need 8)  CONSTITUTIONAL:                                                                          WNL  NEURO:                                                                                             WNL                      EYES:                                                                                                  WNL  ENMT:                                                                                                WNL  CV:                                                                                                      WNL  RESPIRATORY:                                                                                  WNL  GI:                                                                                                       WNL  : JANSEN + / -                                                                                 WNL  MUSKULOSKELETAL:                                                                       WNL  SKIN / BREAST:                                                                                 WNL                                                          LABS:                        15.5   7.35  )-----------( 256      ( 01 Aug 2020 01:07 )             44.8     08-01    143  |  107  |  15  ----------------------------<  101<H>  4.3   |  28  |  1.28    Ca    9.1      01 Aug 2020 01:07    TPro  7.3  /  Alb  3.9  /  TBili  0.3  /  DBili  x   /  AST  16  /  ALT  26  /  AlkPhos  76  08-01    PT/INR - ( 01 Aug 2020 01:07 )   PT: 13.0 sec;   INR: 1.11          PTT - ( 01 Aug 2020 01:07 )  PTT:33.4 sec    CARDIAC MARKERS ( 01 Aug 2020 01:07 )  <0.017 ng/mL / x     / x     / x     / x              RADIOLOGY & ADDITIONAL STUDIES:  CAROTID U/S:    CXR:  < from: Xray Chest 1 View AP/PA (08.01.20 @ 01:26) >    Impression: Superior mediastinal mass noted with deviation of the trachea to the left. Correlation with CT examination recommended    < end of copied text >      CT Scan:  < from: CT Chest No Cont (08.01.20 @ 03:09) >  Impression:    1.  Solid mass in the upper right mediastinum measuring up to 6.9 cm causing mild mass effect and leftward deviation of the trachea without evidence of significant tracheal stenosis. Differential includes neoplastic and inflammatory etiologies. Recommend further evaluation with PET/CT or MRI.  2.  Multiple solid nodules in the right lung measuring up to 3 mm. Based on the 2017 Fleischner Society criteria, if the patient has a history of smokingor is otherwise at high risk for lung cancer, follow-up chest CT in 12 months may be considered to ensure stability. If the patient is at low risk for lung cancer, follow-up is not necessary.  3.  Mild paraseptal and centrilobular emphysema.    < end of copied text >      EKG:      TTE / MERCEDES:    Cardiac Cath: Surgeon: Dr. Rey    Requesting Physician: [facility] ED    HISTORY OF PRESENT ILLNESS (Need 4):  44 year old M, 30+ pack-year smoker, PMHx peptic ulcer disease presented to [facility] on 8/1/20 with shortness of breath and dry cough for 1-2 weeks. 2 weeks prior to admission, he began to have trouble breathing at night. Mucinex and Nyquil provided mild relief. Symptoms persisted, and he developed a dry cough 1 week prior to admission. Went to urgent care and was treated for bronchitis with cough syrup. Dry cough and SOB persisted. 1 day prior to admission, his SOB and dry cough worsened, so he brought himself to hospital. Also reports fatigue, loss of appetite x 1 week. Denies dysphagia. Denies fever/chills, headache, dizziness, hemoptysis. No weight loss. No CP, n/v/d, dysuria, rash, LE swelling. In ED labs significant for  WBC 7.3, D dimer <187, trops neg, CMP wnl. VBG pH 7.4, pCO2 43, pO2 39. CT chest - solid mass in upper R mediastinum measuring up to 6.9 cm causing mild mass effect and leftward deviation of trachea without evidence of tracheal stenosis. Mild bilateral bronchial wall thickening suggesting bronchitis. Mild paraseptal and centrilobular emphysema. Small lung nodules. In ED, received: 500 mg azithro, 1g ceftriaxone, duoneb x 1, 125 mg IV solumedrol. CT surg consulted and patient transferred to [facility] under medicine service for further workup. Admits to SOB and coughing. Denies any CP, palpitations,       PAST MEDICAL & SURGICAL HISTORY:  PUD (peptic ulcer disease)  Status post labral repair of shoulder      MEDICATIONS  (STANDING):  pantoprazole    Tablet 40 milliGRAM(s) Oral before breakfast    MEDICATIONS  (PRN):  albuterol/ipratropium for Nebulization 3 milliLiter(s) Nebulizer every 6 hours PRN Shortness of Breath      Allergies    No Known Drug Allergies  shellfish (Anaphylaxis)    Intolerances        SOCIAL HISTORY:  Smoker:  3-4 cigarettes for 10 years, previous 2packs a day for 15 years.  ETOH use:  No  Ilicit Drug use:  Daily marijuana  Assisted device use (Cane / Walker): None  Live with: Roomate in apartment     Family hISTORY:  No pertinent family history in first degree relatives      Review of Systems (Need 10):  CONSTITUTIONAL: Denies fevers / chills, sweats, fatigue, weight loss, weight gain                                       NEURO:  Denies parathesias, seizures, syncope, confusion                                                                                  EYES:  Denies blurry vision, discharge, pain, loss of vision                                                                                    ENMT:  Denies difficulty hearing, vertigo, dysphagia, epistaxis, recent dental work                                       CV:  Denies chest pain, palpitations, DORANTES, orthopnea                                                                                           RESPIRATORY: +SOB, cough, Denies Wheezing, hemoptysis                                                               GI:  Denies nausea, vomiting, diarrhea, constipation, melena                                                                          : Denies hematuria, dysuria, urgency, incontinence                                                                                          MUSKULOSKELETAL:  Denies arthritis, joint swelling, muscle weakness                                                             SKIN/BREAST:  Denies rash, itching, hair loss, masses                                                                                              PSYCH:  Denies depression, anxiety, suicidal ideation                                                                                                HEME/LYMPH:  Denies bruises easily, enlarged lymph nodes, tender lymph nodes                                          ENDOCRINE:  Denies cold intolerance, heat intolerance, polydipsia                                                                      Vital Signs Last 24 Hrs  T(C): 36.7 (02 Aug 2020 05:56), Max: 37 (01 Aug 2020 06:49)  T(F): 98 (02 Aug 2020 05:56), Max: 98.6 (01 Aug 2020 06:49)  HR: 68 (02 Aug 2020 05:56) (68 - 91)  BP: 154/89 (02 Aug 2020 05:56) (142/90 - 154/89)  BP(mean): --  RR: 17 (02 Aug 2020 05:56) (16 - 18)  SpO2: 99% (02 Aug 2020 05:56) (94% - 99%)    Physical Exam (Need 8)  CONSTITUTIONAL:   Sitting comfortably in bed, nad                                                                         NEURO:  AAOx3, no neuro deficits, CN grossly intact                    EYES:              WNL  ENMT:  Left neck anteriorly nodule palpated  CV:  S1S2, RRR, no murmurs apprecaited   RESPIRATORY:  CTA b/l no w/r/r  GI:      +BS, soft, NT/ND  : No caruso. Deferred  MUSKULOSKELETAL:  WWP, no edema   SKIN / BREAST:                       WNL                                                          LABS:                        15.5   7.35  )-----------( 256      ( 01 Aug 2020 01:07 )             44.8     08-01    143  |  107  |  15  ----------------------------<  101<H>  4.3   |  28  |  1.28    Ca    9.1      01 Aug 2020 01:07    TPro  7.3  /  Alb  3.9  /  TBili  0.3  /  DBili  x   /  AST  16  /  ALT  26  /  AlkPhos  76  08-01    PT/INR - ( 01 Aug 2020 01:07 )   PT: 13.0 sec;   INR: 1.11          PTT - ( 01 Aug 2020 01:07 )  PTT:33.4 sec    CARDIAC MARKERS ( 01 Aug 2020 01:07 )  <0.017 ng/mL / x     / x     / x     / x              RADIOLOGY & ADDITIONAL STUDIES:  CAROTID U/S:    CXR:  < from: Xray Chest 1 View AP/PA (08.01.20 @ 01:26) >    Impression: Superior mediastinal mass noted with deviation of the trachea to the left. Correlation with CT examination recommended    < end of copied text >      CT Scan:  < from: CT Chest No Cont (08.01.20 @ 03:09) >  Impression:    1.  Solid mass in the upper right mediastinum measuring up to 6.9 cm causing mild mass effect and leftward deviation of the trachea without evidence of significant tracheal stenosis. Differential includes neoplastic and inflammatory etiologies. Recommend further evaluation with PET/CT or MRI.  2.  Multiple solid nodules in the right lung measuring up to 3 mm. Based on the 2017 Fleischner Society criteria, if the patient has a history of smokingor is otherwise at high risk for lung cancer, follow-up chest CT in 12 months may be considered to ensure stability. If the patient is at low risk for lung cancer, follow-up is not necessary.  3.  Mild paraseptal and centrilobular emphysema.    < end of copied text >      EKG:      TTE / MERCEDES:    Cardiac Cath:

## 2020-08-02 NOTE — CONSULT NOTE ADULT - SUBJECTIVE AND OBJECTIVE BOX
HPI: 44y Male PMHx PUD and 30 pack year smoking history. Presents to [facility] with increasing SOB for 2 weeks. Since admission patient s/p nebulizers and steroids > feels much improved. Has SOB only when lying flat and better when he is elevated. No fevers, minimal cough and no sputum. No history of exposure to radiation or thyroid cancer in the family. denies any other growths or masses noted. No sx of eye changes, tremors, weight gain or weight loss. Denies any neck pain. No hoarseness of voice changes.     Allergies    No Known Drug Allergies  shellfish (Anaphylaxis)    Intolerances      PAST MEDICAL & SURGICAL HISTORY:  PUD (peptic ulcer disease)  Status post labral repair of shoulder      SOCIAL HISTORY:  Tobacco History: 30 pack years  ETOH Use: minimal    FAMILY HISTORY:  No pertinent family history in first degree relatives    Endocrine Medications:       All other standing medications:   pantoprazole    Tablet 40 milliGRAM(s) Oral before breakfast      All other PRN medications:  albuterol/ipratropium for Nebulization 3 milliLiter(s) Nebulizer every 6 hours PRN      Vital Signs Last 24 Hrs  T(C): 36.7 (02 Aug 2020 05:56), Max: 36.8 (01 Aug 2020 21:13)  T(F): 98 (02 Aug 2020 05:56), Max: 98.2 (01 Aug 2020 21:13)  HR: 68 (02 Aug 2020 05:56) (68 - 91)  BP: 154/89 (02 Aug 2020 05:56) (146/90 - 154/89)  BP(mean): --  RR: 17 (02 Aug 2020 05:56) (17 - 18)  SpO2: 99% (02 Aug 2020 05:56) (97% - 99%)    LABS:  CBC-                        15.4   10.57 )-----------( 259      ( 02 Aug 2020 08:17 )             46.4         08-02    142  |  105  |  13  ----------------------------<  82  3.9   |  25  |  1.05    Ca    9.2      02 Aug 2020 08:17  Mg     1.9     08-02    TPro  7.3  /  Alb  3.9  /  TBili  0.3  /  DBili  x   /  AST  16  /  ALT  26  /  AlkPhos  76  08-01    Coagulation Studies-  PT/INR - ( 01 Aug 2020 01:07 )   PT: 13.0 sec;   INR: 1.11          PTT - ( 01 Aug 2020 01:07 )  PTT:33.4 sec  Endocrine Panel-  --  --  9.2 mg/dL  --  --  9.1 mg/dL    PHYSICAL EXAM:  Patient AAOx3, comfortable, resting in bed  Resp: no resp distress, no stridor or stertor. comfortable on room air.   HEENT: [Head] normocephalic, atraumatic; [CN] CN2-12 grossly intact; [Throat] oropharynx wnl, no inflammation erythema or exudate; [Oral] Moist mucous membranes, no thrush, no vesicles, no lesions, good dentition; [Neck] a firm, 1cm fixed nodule was felt in level 1B, non tender, no overlying skin changes. (patient states that it has been there for a few years an occasionally gets infected and he is able to express pus after which it resolves). Midline tracheal structures well palpated. thyroid cartilage and cricoid wnl. No other appreciable nodules felt. Unable to palpate substernal R sided mass.   [Hume's sign] is negative    Fiberoptic Nasopharyngolaryngoscopy (NPL):   Nasopharynx wnl  BOT/vallecula normal  Epiglottis sharp  AE folds nonedematous  Arytenoids mobile  Vocal folds mobile bilaterally  No masses or lesions visualized in post cricoid space or pyriform sinuses bilaterally   Subglottic space visualized and patent.     < from: CT Chest w/ IV Cont (08.01.20 @ 18:33) >  Evaluation of the chest demonstrates a large solid and cystic nodule extending from the inferiorright lobe of the thyroid gland measuring 5.6 x 5.7 x 6.8 cm with underlying heterogeneous nodularity of the thyroid gland. There is normal heart size. There is no pleural and no pericardial effusion. Negative for thoracic inlet, axillary, mediastinal or hilar adenopathy. There is no nallely central pulmonary embolus. There is no pleural and no pericardial effusion. Evaluation of the lung parenchyma demonstrates moderate paraseptal emphysematous change. No endobronchial lesion. No pulmonary consolidation or mass. Scattered regions of bronchiolar impaction. Scattered 2 to 3 mm subpleural pulmonary micronodules without suspicious feature, for which follow-up is not required, including in the presence of risk factors. Evaluation of the abdomen demonstrates no significant abnormality. Osseous structures are unremarkable.          IMPRESSION:    Large solid and cystic nodule of the thyroid gland extending into the right superior mediastinum.      < end of copied text >      A/P:  44y Male with 30 pack year history of smoking now presents with R sided retrosternal 7cm mass  - SOB likely pulmonary etiology as patient reports improvement on nebs/steroids. Unlikely to be structural/mass effect causing SOB as fiberoptic exam wnl, CT does not show any airway compression. Rec pulm f/u.   - Large 7cm R sided inferior thyroid mass, mixed solid and cystic, mostly retrosternal. No compression of airway  - Will require outpatient ENT oncology follow-up with outpatient biopsy/FNA. Patient may follow-up with Dr Glen Villavicencio call office  for appointment.   - Thyroid USS and Thyroid function tests  - L sided nodule likely infected sebaceous cyst  - Plan above discussed with attending on call.       Thank you for the consult, please page ENT at [phone] with any questions/concerns.  -------------------------------------------------  Roel Montague MD  Department of Otolaryngology - Head and Neck Surgery  [facility] HPI: 44y Male PMHx PUD and 30 pack year smoking history. Presents to [facility] with increasing SOB for 2 weeks. Since admission patient s/p nebulizers and steroids > feels much improved. Has SOB only when lying flat and better when he is elevated. No fevers, minimal cough and no sputum. No history of exposure to radiation or thyroid cancer in the family. denies any other growths or masses noted. No sx of eye changes, tremors, weight gain or weight loss. Denies any neck pain. No hoarseness of voice changes.     Allergies    No Known Drug Allergies  shellfish (Anaphylaxis)    Intolerances      PAST MEDICAL & SURGICAL HISTORY:  PUD (peptic ulcer disease)  Status post labral repair of shoulder      SOCIAL HISTORY:  Tobacco History: 30 pack years  ETOH Use: minimal    FAMILY HISTORY:  No pertinent family history in first degree relatives    Endocrine Medications:       All other standing medications:   pantoprazole    Tablet 40 milliGRAM(s) Oral before breakfast      All other PRN medications:  albuterol/ipratropium for Nebulization 3 milliLiter(s) Nebulizer every 6 hours PRN      Vital Signs Last 24 Hrs  T(C): 36.7 (02 Aug 2020 05:56), Max: 36.8 (01 Aug 2020 21:13)  T(F): 98 (02 Aug 2020 05:56), Max: 98.2 (01 Aug 2020 21:13)  HR: 68 (02 Aug 2020 05:56) (68 - 91)  BP: 154/89 (02 Aug 2020 05:56) (146/90 - 154/89)  BP(mean): --  RR: 17 (02 Aug 2020 05:56) (17 - 18)  SpO2: 99% (02 Aug 2020 05:56) (97% - 99%)    LABS:  CBC-                        15.4   10.57 )-----------( 259      ( 02 Aug 2020 08:17 )             46.4         08-02    142  |  105  |  13  ----------------------------<  82  3.9   |  25  |  1.05    Ca    9.2      02 Aug 2020 08:17  Mg     1.9     08-02    TPro  7.3  /  Alb  3.9  /  TBili  0.3  /  DBili  x   /  AST  16  /  ALT  26  /  AlkPhos  76  08-01    Coagulation Studies-  PT/INR - ( 01 Aug 2020 01:07 )   PT: 13.0 sec;   INR: 1.11          PTT - ( 01 Aug 2020 01:07 )  PTT:33.4 sec  Endocrine Panel-  --  --  9.2 mg/dL  --  --  9.1 mg/dL    PHYSICAL EXAM:  Patient AAOx3, comfortable, resting in bed  Resp: no resp distress, no stridor or stertor. comfortable on room air.   HEENT: [Head] normocephalic, atraumatic; [CN] CN2-12 grossly intact; [Throat] oropharynx wnl, no inflammation erythema or exudate; [Oral] Moist mucous membranes, no thrush, no vesicles, no lesions, good dentition; [Neck] a firm, 1cm fixed nodule was felt in LEFT level 1B, non tender, no overlying skin changes. (patient states that it has been there for a few years an occasionally gets infected and he is able to express pus after which it resolves). Midline tracheal structures well palpated. thyroid cartilage and cricoid wnl. No other appreciable nodules felt. Unable to palpate substernal R sided mass.   [Erika's sign] is negative    Fiberoptic Nasopharyngolaryngoscopy (NPL):   Nasopharynx wnl  BOT/vallecula normal  Epiglottis sharp  AE folds nonedematous  Arytenoids mobile  Vocal folds mobile bilaterally  No masses or lesions visualized in post cricoid space or pyriform sinuses bilaterally   Subglottic space visualized and patent.     < from: CT Chest w/ IV Cont (08.01.20 @ 18:33) >  Evaluation of the chest demonstrates a large solid and cystic nodule extending from the inferiorright lobe of the thyroid gland measuring 5.6 x 5.7 x 6.8 cm with underlying heterogeneous nodularity of the thyroid gland. There is normal heart size. There is no pleural and no pericardial effusion. Negative for thoracic inlet, axillary, mediastinal or hilar adenopathy. There is no nallely central pulmonary embolus. There is no pleural and no pericardial effusion. Evaluation of the lung parenchyma demonstrates moderate paraseptal emphysematous change. No endobronchial lesion. No pulmonary consolidation or mass. Scattered regions of bronchiolar impaction. Scattered 2 to 3 mm subpleural pulmonary micronodules without suspicious feature, for which follow-up is not required, including in the presence of risk factors. Evaluation of the abdomen demonstrates no significant abnormality. Osseous structures are unremarkable.          IMPRESSION:    Large solid and cystic nodule of the thyroid gland extending into the right superior mediastinum.      < end of copied text >      A/P:  44y Male with 30 pack year history of smoking now presents with R sided retrosternal 7cm mass  - SOB likely pulmonary etiology as patient reports improvement on nebs/steroids. Unlikely to be structural/mass effect causing SOB as fiberoptic exam wnl, CT does not show any airway compression. Rec pulm f/u.   - Large 7cm R sided inferior thyroid mass, mixed solid and cystic, mostly retrosternal. No compression of airway  - Will require outpatient ENT oncology follow-up with outpatient biopsy/FNA. Patient may follow-up with Dr Glen Villavicencio call office  for appointment.   - Thyroid USS and Thyroid function tests  - L sided nodule likely infected sebaceous cyst  - Plan above discussed with attending on call.       Thank you for the consult, please page ENT at [phone] with any questions/concerns.  -------------------------------------------------  Roel Montague MD  Department of Otolaryngology - Head and Neck Surgery  [facility]

## 2020-08-03 ENCOUNTER — TRANSCRIPTION ENCOUNTER (OUTPATIENT)
Age: 45
End: 2020-08-03

## 2020-08-03 VITALS
RESPIRATION RATE: 18 BRPM | TEMPERATURE: 98 F | HEART RATE: 73 BPM | SYSTOLIC BLOOD PRESSURE: 145 MMHG | OXYGEN SATURATION: 97 % | DIASTOLIC BLOOD PRESSURE: 98 MMHG

## 2020-08-03 LAB
ANION GAP SERPL CALC-SCNC: 8 MMOL/L — SIGNIFICANT CHANGE UP (ref 5–17)
BUN SERPL-MCNC: 16 MG/DL — SIGNIFICANT CHANGE UP (ref 7–23)
CALCIUM SERPL-MCNC: 9.3 MG/DL — SIGNIFICANT CHANGE UP (ref 8.4–10.5)
CHLORIDE SERPL-SCNC: 103 MMOL/L — SIGNIFICANT CHANGE UP (ref 96–108)
CO2 SERPL-SCNC: 27 MMOL/L — SIGNIFICANT CHANGE UP (ref 22–31)
CREAT SERPL-MCNC: 1.16 MG/DL — SIGNIFICANT CHANGE UP (ref 0.5–1.3)
GLUCOSE SERPL-MCNC: 98 MG/DL — SIGNIFICANT CHANGE UP (ref 70–99)
HCT VFR BLD CALC: 48.4 % — SIGNIFICANT CHANGE UP (ref 39–50)
HGB BLD-MCNC: 16.1 G/DL — SIGNIFICANT CHANGE UP (ref 13–17)
MAGNESIUM SERPL-MCNC: 1.7 MG/DL — SIGNIFICANT CHANGE UP (ref 1.6–2.6)
MCHC RBC-ENTMCNC: 29.3 PG — SIGNIFICANT CHANGE UP (ref 27–34)
MCHC RBC-ENTMCNC: 33.3 GM/DL — SIGNIFICANT CHANGE UP (ref 32–36)
MCV RBC AUTO: 88 FL — SIGNIFICANT CHANGE UP (ref 80–100)
NRBC # BLD: 0 /100 WBCS — SIGNIFICANT CHANGE UP (ref 0–0)
PHOSPHATE SERPL-MCNC: 3.6 MG/DL — SIGNIFICANT CHANGE UP (ref 2.5–4.5)
PLATELET # BLD AUTO: 266 K/UL — SIGNIFICANT CHANGE UP (ref 150–400)
POTASSIUM SERPL-MCNC: 3.7 MMOL/L — SIGNIFICANT CHANGE UP (ref 3.5–5.3)
POTASSIUM SERPL-SCNC: 3.7 MMOL/L — SIGNIFICANT CHANGE UP (ref 3.5–5.3)
RBC # BLD: 5.5 M/UL — SIGNIFICANT CHANGE UP (ref 4.2–5.8)
RBC # FLD: 13.8 % — SIGNIFICANT CHANGE UP (ref 10.3–14.5)
SODIUM SERPL-SCNC: 138 MMOL/L — SIGNIFICANT CHANGE UP (ref 135–145)
T3 SERPL-MCNC: 123 NG/DL — SIGNIFICANT CHANGE UP (ref 80–200)
T4 AB SER-ACNC: 9.73 UG/DL — SIGNIFICANT CHANGE UP (ref 3.17–11.72)
TSH SERPL-MCNC: 0.43 UIU/ML — SIGNIFICANT CHANGE UP (ref 0.35–4.94)
WBC # BLD: 7.64 K/UL — SIGNIFICANT CHANGE UP (ref 3.8–10.5)
WBC # FLD AUTO: 7.64 K/UL — SIGNIFICANT CHANGE UP (ref 3.8–10.5)

## 2020-08-03 PROCEDURE — 96374 THER/PROPH/DIAG INJ IV PUSH: CPT

## 2020-08-03 PROCEDURE — 36415 COLL VENOUS BLD VENIPUNCTURE: CPT

## 2020-08-03 PROCEDURE — 84436 ASSAY OF TOTAL THYROXINE: CPT

## 2020-08-03 PROCEDURE — 85730 THROMBOPLASTIN TIME PARTIAL: CPT

## 2020-08-03 PROCEDURE — 80053 COMPREHEN METABOLIC PANEL: CPT

## 2020-08-03 PROCEDURE — 99239 HOSP IP/OBS DSCHRG MGMT >30: CPT | Mod: GC

## 2020-08-03 PROCEDURE — 99232 SBSQ HOSP IP/OBS MODERATE 35: CPT

## 2020-08-03 PROCEDURE — 99285 EMERGENCY DEPT VISIT HI MDM: CPT | Mod: 25

## 2020-08-03 PROCEDURE — 71250 CT THORAX DX C-: CPT

## 2020-08-03 PROCEDURE — 94640 AIRWAY INHALATION TREATMENT: CPT

## 2020-08-03 PROCEDURE — 87040 BLOOD CULTURE FOR BACTERIA: CPT

## 2020-08-03 PROCEDURE — 71045 X-RAY EXAM CHEST 1 VIEW: CPT

## 2020-08-03 PROCEDURE — 82803 BLOOD GASES ANY COMBINATION: CPT

## 2020-08-03 PROCEDURE — 84145 PROCALCITONIN (PCT): CPT

## 2020-08-03 PROCEDURE — 85610 PROTHROMBIN TIME: CPT

## 2020-08-03 PROCEDURE — 84443 ASSAY THYROID STIM HORMONE: CPT

## 2020-08-03 PROCEDURE — 93005 ELECTROCARDIOGRAM TRACING: CPT

## 2020-08-03 PROCEDURE — 84100 ASSAY OF PHOSPHORUS: CPT

## 2020-08-03 PROCEDURE — 83735 ASSAY OF MAGNESIUM: CPT

## 2020-08-03 PROCEDURE — 85025 COMPLETE CBC W/AUTO DIFF WBC: CPT

## 2020-08-03 PROCEDURE — 84480 ASSAY TRIIODOTHYRONINE (T3): CPT

## 2020-08-03 PROCEDURE — 76536 US EXAM OF HEAD AND NECK: CPT | Mod: 26

## 2020-08-03 PROCEDURE — 84484 ASSAY OF TROPONIN QUANT: CPT

## 2020-08-03 PROCEDURE — 76536 US EXAM OF HEAD AND NECK: CPT

## 2020-08-03 PROCEDURE — 80048 BASIC METABOLIC PNL TOTAL CA: CPT

## 2020-08-03 PROCEDURE — 85379 FIBRIN DEGRADATION QUANT: CPT

## 2020-08-03 PROCEDURE — 71260 CT THORAX DX C+: CPT

## 2020-08-03 PROCEDURE — 83880 ASSAY OF NATRIURETIC PEPTIDE: CPT

## 2020-08-03 PROCEDURE — 85027 COMPLETE CBC AUTOMATED: CPT

## 2020-08-03 PROCEDURE — 86140 C-REACTIVE PROTEIN: CPT

## 2020-08-03 PROCEDURE — 87635 SARS-COV-2 COVID-19 AMP PRB: CPT

## 2020-08-03 PROCEDURE — 96375 TX/PRO/DX INJ NEW DRUG ADDON: CPT

## 2020-08-03 RX ORDER — MAGNESIUM SULFATE 500 MG/ML
2 VIAL (ML) INJECTION ONCE
Refills: 0 | Status: COMPLETED | OUTPATIENT
Start: 2020-08-03 | End: 2020-08-03

## 2020-08-03 RX ADMIN — Medication 50 GRAM(S): at 08:32

## 2020-08-03 RX ADMIN — PANTOPRAZOLE SODIUM 40 MILLIGRAM(S): 20 TABLET, DELAYED RELEASE ORAL at 06:23

## 2020-08-03 NOTE — DISCHARGE NOTE PROVIDER - CARE PROVIDERS DIRECT ADDRESSES
,[email or web address],DirectAddress_Unknown ,[email or web address],DirectAddress_Unknown,DirectAddress_Unknown

## 2020-08-03 NOTE — DISCHARGE NOTE NURSING/CASE MANAGEMENT/SOCIAL WORK - NSDCFUADDAPPT_GEN_ALL_CORE_FT
Please Call Dr. Villavicencio's office to make an ENT appointment about the mass in your neck within the next 1 week.   Glen Villavicencio  OTOLARYNGOLOGY  [address] [address]  Phone: [phone]    Please call to make an appointment for Dr. Boyd at the pulmonology fellows clinic to follow up on the acute bronchitis and emphysema.  Mindy Boyd  CRITICAL CARE MEDICINE  [address] [place] [address]  Phone: [phone]    Please follow up with Dr. Rey, the chest surgeon in 1-2 weeks. Their office will call you to make the appointment.   Rizwan Rey)  Surgery  [address] [address]  Phone: [phone]

## 2020-08-03 NOTE — DISCHARGE NOTE PROVIDER - PROVIDER TOKENS
PROVIDER:[TOKEN:[[id number]],FOLLOWUP:[1 week]] PROVIDER:[TOKEN:[[id number]],FOLLOWUP:[1 week]],PROVIDER:[TOKEN:[[id number]],FOLLOWUP:[2 weeks]] PROVIDER:[TOKEN:[[id number]],FOLLOWUP:[1 week]],PROVIDER:[TOKEN:[[id number]],FOLLOWUP:[2 weeks]],PROVIDER:[TOKEN:[[id number]],FOLLOWUP:[2 weeks]]

## 2020-08-03 NOTE — DISCHARGE NOTE PROVIDER - NSDCCPCAREPLAN_GEN_ALL_CORE_FT
PRINCIPAL DISCHARGE DIAGNOSIS  Diagnosis: Thyroid mass  Assessment and Plan of Treatment: You came into the hospital with difficulty breathing, because of this you had multiple scans done such as a CT scan with contrast. The CT scan showed a mass in your neck near your thyroid that is pushing up against your trachea but you have not had any problems swallowing, eating, or breathing since being admitted. For this mass we had the chest surgeons and the ear nose throat doctors see you to take a look at it. The mass seems to be coming from your thyroid on the right side. In order to take a closer look at this we did an ultrasound in the hospital. You should see the ENT doctors in 1-2 weeks to follow up on the results of that scan. The mass may need a biopsy, or a removal of the mass or part of your thyroid gland. Please be sure to call the office and make an appointment for this.      SECONDARY DISCHARGE DIAGNOSES  Diagnosis: Acute bronchitis  Assessment and Plan of Treatment: You came into the hospital with difficulty breathing. The pulmonology doctors saw you and were concerned that you had acute bronchitis so you received some steroids and a breathing treatment that seemed to help the difficulty breathing. On the CT scan that we did, we saw you had some changes in your lung called emphysema which is when the small air pockets in your lungs called alveoli tend to get larger and combine together that make it harder to take in oxygen from the air and give off carbon dioxide. These changes are likely due to your history of smoking. You should follow up with the pulmonary doctors outpatient to do some further testing about this.    Diagnosis: Smoking  Assessment and Plan of Treatment: You are a smoker. It is probably best if you try to quit smoking. There are nicotine gums, patches, and medications that can be taken to help do this. By quitting smoking you can help to improve your lung function and decrease your chances of cancer and heart disease in the future. Please follow up with your primary care doctor and the pulmonology doctors about this and they can help find you ways to quit smoking.

## 2020-08-03 NOTE — DISCHARGE NOTE PROVIDER - HOSPITAL COURSE
#Discharge: do not delete        44M 30+ pack-year smoker [facility] peptic ulcer disease        Presented with shortness of breath and dry cough for 1-2 weeks was found to have 7 cm mediastinal R thyroid mass that is pushing trachea and some emphysematous changes on CT chest.        Problem List/Main Diagnoses (system-based):     #Thyroid Mass: Pt had SOB on admission that resolved with duoneb use and elevating the head of the bed to 30 degrees. Has been eating and drinking well. On CT was found to have 6.9cm R mediastinal solid and cystic thyroid mass that is pushing trachea leftward. However is tolerating PO intake well and no longer has SOB. ENT and CT surgery were consulted. ENT outpatient follow up required.         #Acute bronchitis: Pt has extensive smoking history for over 30 years. Presented with SOB that has now resolved with duoneb use and was found to have a thyroid mass on CT.     Pulm believes SOB due to acute bronchitis. Was found to have emphysema on CT scan and should follow up with pulmonology fellows clinic outpatient for further workup and PFTs.        #Smoking cessation: Pt was counseled on smoking cessation while they were here and is open to trying to quit.         Inpatient treatment course: Pt was seen and evaluated by CT surgery for mass by getting CT IV contrast. Received Duonebs, solumedrol, ctx, and azithro in the ED. SOB resolved after duoneb treatment. Mass was found to be a thyroid nodule and ENT was consulted for the mass. TFTs and Thyroid ultrasound were done that can be followed up on outpatient.     New medications: None    Labs to be followed outpatient: Thyroid ultrasound results    Exam to be followed outpatient: ENT with Dr. Villavicencio in 1-2 weeks. Pulmonary fellows clinic in 2 weeks. Primary care doctor in 2-3 weeks. #Discharge: do not delete        44M 30+ pack-year smoker [facility] peptic ulcer disease        Presented with shortness of breath and dry cough for 1-2 weeks was found to have 7 cm mediastinal R thyroid mass that is pushing trachea and some emphysematous changes on CT chest.        Problem List/Main Diagnoses (system-based):     #Thyroid Mass: Pt had SOB on admission that resolved with duoneb use and elevating the head of the bed to 30 degrees. Has been eating and drinking well. On CT was found to have 6.9cm R mediastinal solid and cystic thyroid mass that is pushing trachea leftward. However is tolerating PO intake well and no longer has SOB. ENT and CT surgery were consulted. ENT outpatient follow up required.         #Acute bronchitis: Pt has extensive smoking history for over 30 years. Presented with SOB that has now resolved with duoneb use and was found to have a thyroid mass on CT.     Pulm believes SOB due to acute bronchitis. Was found to have emphysema on CT scan and should follow up with pulmonology fellows clinic outpatient for further workup and PFTs.        #Smoking cessation: Pt was counseled on smoking cessation while they were here and is open to trying to quit.         Inpatient treatment course: Pt was seen and evaluated by CT surgery for mass by getting CT IV contrast. Received Duonebs, solumedrol, ctx, and azithro in the ED. SOB resolved after duoneb treatment. Mass was found to be a thyroid nodule and ENT was consulted for the mass. TFTs and Thyroid ultrasound were done that can be followed up on outpatient.     New medications: None    Labs to be followed outpatient: Thyroid ultrasound results    Exam to be followed outpatient: ENT with Dr. Villavicencio in 1-2 weeks. Dr. Boyd at the pulmonary fellows clinic in 2 weeks. Primary care doctor in 2-3 weeks. #Discharge: do not delete        44M 30+ pack-year smoker [facility] peptic ulcer disease        Presented with shortness of breath and dry cough for 1-2 weeks was found to have 7 cm mediastinal R thyroid mass that is pushing trachea and some emphysematous changes on CT chest.        Problem List/Main Diagnoses (system-based):     #Thyroid Mass: Pt had SOB on admission that resolved with duoneb use and elevating the head of the bed to 30 degrees. Has been eating and drinking well. On CT was found to have 6.9cm R mediastinal solid and cystic thyroid mass that is pushing trachea leftward. However is tolerating PO intake well and no longer has SOB. ENT and CT surgery were consulted. ENT outpatient follow up required.         #Acute bronchitis: Pt has extensive smoking history for over 30 years. Presented with SOB that has now resolved with duoneb use and was found to have a thyroid mass on CT.     Pulm believes SOB due to acute bronchitis. Was found to have emphysema on CT scan and should follow up with pulmonology fellows clinic outpatient for further workup and PFTs.        #Smoking cessation: Pt was counseled on smoking cessation while they were here and is open to trying to quit.         Inpatient treatment course: Pt was seen and evaluated by CT surgery for mass by getting CT IV contrast. Received Duonebs, solumedrol, ctx, and azithro in the ED. SOB resolved after duoneb treatment. Mass was found to be a thyroid nodule and ENT was consulted for the mass. TFTs and Thyroid ultrasound were done that can be followed up on outpatient.     New medications: None    Labs to be followed outpatient: Thyroid ultrasound results    Exam to be followed outpatient: ENT with Dr. Villavicencio in 1-2 weeks. Dr. Boyd at the pulmonary fellows clinic in 2 weeks. Primary care doctor in 2-3 weeks. Dr. Rey, CT surgeon in 2 weeks.

## 2020-08-03 NOTE — PROGRESS NOTE ADULT - ASSESSMENT
44M smoker w/ hx of PUD p/w SOB found to have right mediastinum mass.  GI consulted for diagnostic EGD and EUS.    #Right mediastinal mass  Patient with new lesion found in the setting of dyspnea and smoking hx.  No evidence of thoracic lymphadenopathy.  Ddx is broad and includes inflammatory and malignancy.  CT final read notable for thyroid origin.  CTS recommend thyroid study and ENT evaluation    GI will sign off, please reconsult as needed    Case d/w biliary attg

## 2020-08-03 NOTE — DISCHARGE NOTE NURSING/CASE MANAGEMENT/SOCIAL WORK - NSDCPEEMAIL_GEN_ALL_CORE
[facility] for Tobacco Control email [email or web address]

## 2020-08-03 NOTE — DISCHARGE NOTE PROVIDER - NSDCFUADDAPPT_GEN_ALL_CORE_FT
Please Call Dr. Villavicencio's office to make an ENT appointment about the mass in your neck within the next 1 week.   Glen Villavicencio  OTOLARYNGOLOGY  [address] [place] [address]  Phone: [phone]    Please call to make an appointment for the pulmonology fellows clinic to follow up on the acute bronchitis and emphysema. Please Call Dr. Villavicencio's office to make an ENT appointment about the mass in your neck within the next 1 week.   Glen Villavicencio  OTOLARYNGOLOGY  [address] [address]  Phone: [phone]    Please call to make an appointment for Dr. Boyd at the pulmonology fellows clinic to follow up on the acute bronchitis and emphysema.  Mindy Boyd  CRITICAL CARE MEDICINE  [address] [place] [address]  Phone: [phone] Please Call Dr. Villavicencio's office to make an ENT appointment about the mass in your neck within the next 1 week.   Glen Villavicencio  OTOLARYNGOLOGY  [address] [address]  Phone: [phone]    Please call to make an appointment for Dr. Boyd at the pulmonology fellows clinic to follow up on the acute bronchitis and emphysema.  Mindy Boyd  CRITICAL CARE MEDICINE  [address] [place] [address]  Phone: [phone]    Please follow up with Dr. Rey, the chest surgeon in 1-2 weeks. Their office will call you to make the appointment.   Rizwan Rey)  Surgery  [address] [address]  Phone: [phone]

## 2020-08-03 NOTE — DISCHARGE NOTE NURSING/CASE MANAGEMENT/SOCIAL WORK - NSDCPEWEB_GEN_ALL_CORE
[facility] for Tobacco Control website --- [email or web address] website --- [email or web address]

## 2020-08-03 NOTE — DISCHARGE NOTE NURSING/CASE MANAGEMENT/SOCIAL WORK - PATIENT PORTAL LINK FT
You can access the FollowMyHealth Patient Portal offered by [facility] by registering at the following website: [email or web address]. By joining [employer]’s FollowMyHealth portal, you will also be able to view your health information using other applications (apps) compatible with our system.

## 2020-08-03 NOTE — PROGRESS NOTE ADULT - ASSESSMENT
43 yo M with right paratracheal and retrotracheal solid/cystic mass, appears to be extending from the right inferior lobe of the thyroid. ENT has been consulted for recommendations.     Plan  -Appreciate ENT recommendations, will follow up TFTs, and thyroid US and biopsy results.   -The mass retrotracheal and extends into the middle mediastinum and right thoracic cavity, it can be mobilized from the right chest to assist in removal if partial or total thyroidectomy is required; we will discuss surgical plans with ENT after results of diagnostic tests are finalized.   -We will continue to follow.     Rizwan Rey MD  Attending Thoracic Surgeon  [facility]

## 2020-08-03 NOTE — DISCHARGE NOTE PROVIDER - CARE PROVIDER_API CALL
Glen Villavicencio  OTOLARYNGOLOGY  [address] [address]  Phone: [phone]  Fax: [phone]  Follow Up Time: 1 week Glen Villavicencio  OTOLARYNGOLOGY  [address] [address]  Phone: [phone]  Fax: [phone]  Follow Up Time: 1 week    Mindy Boyd  CRITICAL CARE MEDICINE  [address] [place] [address]  Phone: [phone]  Fax: [phone]  Follow Up Time: 2 weeks Glen Villavicencio  OTOLARYNGOLOGY  [address] [address]  Phone: [phone]  Fax: [phone]  Follow Up Time: 1 week    Mindy Boyd  CRITICAL CARE MEDICINE  [address] [place] [address]  Phone: [phone]  Fax: [phone]  Follow Up Time: 2 weeks    Rizwan Rey)  Surgery  [address] [address]  Phone: [phone]  Fax: [phone]  Follow Up Time: 2 weeks

## 2020-08-03 NOTE — PROGRESS NOTE ADULT - SUBJECTIVE AND OBJECTIVE BOX
Patient seen this morning. In good spirits. No complaints. Breathing well.     In hospital bed, comfortable. No acute distress.

## 2020-08-03 NOTE — PROGRESS NOTE ADULT - SUBJECTIVE AND OBJECTIVE BOX
Pt seen and examined at bedside.  No acute event overnight and no new complaint.  Denies fever, chill, cp, sob, abd pain, nausea, and vomiting.      Allergies    No Known Drug Allergies  shellfish (Anaphylaxis)    Intolerances      MEDICATIONS:  MEDICATIONS  (STANDING):  pantoprazole    Tablet 40 milliGRAM(s) Oral before breakfast    MEDICATIONS  (PRN):  albuterol/ipratropium for Nebulization 3 milliLiter(s) Nebulizer every 6 hours PRN Shortness of Breath    Vital Signs Last 24 Hrs  T(C): 36.6 (03 Aug 2020 10:57), Max: 36.9 (03 Aug 2020 05:49)  T(F): 97.9 (03 Aug 2020 10:57), Max: 98.4 (03 Aug 2020 05:49)  HR: 62 (03 Aug 2020 10:57) (62 - 72)  BP: 147/90 (03 Aug 2020 10:57) (141/95 - 159/86)  BP(mean): --  RR: 18 (03 Aug 2020 10:57) (18 - 18)  SpO2: 97% (03 Aug 2020 10:57) (96% - 98%)    PHYSICAL EXAM:    General: Well developed; well nourished; in no acute distress  HEENT: MMM, conjunctiva and sclera clear  Gastrointestinal: Soft non-tender non-distended; Normal bowel sounds; No hepatosplenomegaly. No rebound or guarding  Skin: Warm and dry. No obvious rash    LABS:                        16.1   7.64  )-----------( 266      ( 03 Aug 2020 06:23 )             48.4     08-03    138  |  103  |  16  ----------------------------<  98  3.7   |  27  |  1.16    Ca    9.3      03 Aug 2020 06:23  Phos  3.6     08-03  Mg     1.7     08-03                        Culture - Blood (collected 01 Aug 2020 08:20)  Source: .Blood Blood-Peripheral  Preliminary Report (02 Aug 2020 09:01):    No growth to date.    Culture - Blood (collected 01 Aug 2020 08:20)  Source: .Blood Blood-Peripheral  Preliminary Report (02 Aug 2020 09:01):    No growth to date.      RADIOLOGY & ADDITIONAL STUDIES: reviewed

## 2020-08-05 PROBLEM — K27.9 PEPTIC ULCER, SITE UNSPECIFIED, UNSPECIFIED AS ACUTE OR CHRONIC, WITHOUT HEMORRHAGE OR PERFORATION: Chronic | Status: ACTIVE | Noted: 2020-08-01

## 2020-08-06 LAB
CULTURE RESULTS: SIGNIFICANT CHANGE UP
CULTURE RESULTS: SIGNIFICANT CHANGE UP
SPECIMEN SOURCE: SIGNIFICANT CHANGE UP
SPECIMEN SOURCE: SIGNIFICANT CHANGE UP

## 2020-08-07 DIAGNOSIS — E04.1 NONTOXIC SINGLE THYROID NODULE: ICD-10-CM

## 2020-08-07 DIAGNOSIS — R06.02 SHORTNESS OF BREATH: ICD-10-CM

## 2020-08-07 DIAGNOSIS — Z91.013 ALLERGY TO SEAFOOD: ICD-10-CM

## 2020-08-07 DIAGNOSIS — J20.9 ACUTE BRONCHITIS, UNSPECIFIED: ICD-10-CM

## 2020-08-07 DIAGNOSIS — J39.8 OTHER SPECIFIED DISEASES OF UPPER RESPIRATORY TRACT: ICD-10-CM

## 2020-08-07 DIAGNOSIS — K27.9 PEPTIC ULCER, SITE UNSPECIFIED, UNSPECIFIED AS ACUTE OR CHRONIC, WITHOUT HEMORRHAGE OR PERFORATION: ICD-10-CM

## 2020-08-07 DIAGNOSIS — F17.200 NICOTINE DEPENDENCE, UNSPECIFIED, UNCOMPLICATED: ICD-10-CM

## 2020-08-07 DIAGNOSIS — J43.2 CENTRILOBULAR EMPHYSEMA: ICD-10-CM

## 2020-08-07 DIAGNOSIS — R91.8 OTHER NONSPECIFIC ABNORMAL FINDING OF LUNG FIELD: ICD-10-CM

## 2020-08-07 LAB — PROCALCITONIN SERPL-MCNC: 0.02 NG/ML — SIGNIFICANT CHANGE UP (ref 0.02–0.1)

## 2020-08-12 ENCOUNTER — APPOINTMENT (OUTPATIENT)
Dept: OTOLARYNGOLOGY | Facility: CLINIC | Age: 45
End: 2020-08-12
Payer: COMMERCIAL

## 2020-08-12 ENCOUNTER — APPOINTMENT (OUTPATIENT)
Dept: THORACIC SURGERY | Facility: CLINIC | Age: 45
End: 2020-08-12
Payer: COMMERCIAL

## 2020-08-12 VITALS
OXYGEN SATURATION: 98 % | WEIGHT: 183 LBS | HEART RATE: 79 BPM | SYSTOLIC BLOOD PRESSURE: 143 MMHG | DIASTOLIC BLOOD PRESSURE: 94 MMHG | TEMPERATURE: 98.3 F | HEIGHT: 74 IN | BODY MASS INDEX: 23.49 KG/M2

## 2020-08-12 VITALS
HEIGHT: 74 IN | DIASTOLIC BLOOD PRESSURE: 94 MMHG | WEIGHT: 187 LBS | BODY MASS INDEX: 24 KG/M2 | SYSTOLIC BLOOD PRESSURE: 137 MMHG | RESPIRATION RATE: 18 BRPM | TEMPERATURE: 96.9 F | HEART RATE: 73 BPM | OXYGEN SATURATION: 99 %

## 2020-08-12 DIAGNOSIS — Z72.89 OTHER PROBLEMS RELATED TO LIFESTYLE: ICD-10-CM

## 2020-08-12 DIAGNOSIS — J39.8 OTHER SPECIFIED DISEASES OF UPPER RESPIRATORY TRACT: ICD-10-CM

## 2020-08-12 DIAGNOSIS — Z87.11 PERSONAL HISTORY OF PEPTIC ULCER DISEASE: ICD-10-CM

## 2020-08-12 DIAGNOSIS — F17.200 NICOTINE DEPENDENCE, UNSPECIFIED, UNCOMPLICATED: ICD-10-CM

## 2020-08-12 DIAGNOSIS — Z78.9 OTHER SPECIFIED HEALTH STATUS: ICD-10-CM

## 2020-08-12 PROCEDURE — 31575 DIAGNOSTIC LARYNGOSCOPY: CPT

## 2020-08-12 PROCEDURE — 99214 OFFICE O/P EST MOD 30 MIN: CPT

## 2020-08-12 PROCEDURE — 99203 OFFICE O/P NEW LOW 30 MIN: CPT | Mod: 25

## 2020-08-12 NOTE — ASSESSMENT
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 \par \par Since discharge from the hospital, he reports breathing much improved after course of steroids and nebulizer treatments. He was not discharged home with any medications. Denies cough, chest pain, fever/chills. He saw Dr Villavicencio earlier today who recommended resection of the thyroid/mediastinal mass. \par \par Advised patient to undergo RVATS, robotic assisted, mobilization of mediastinal mass. Explained to patient that this surgery would be a combined case with Dr Villavicencio to mobilize the mass through the right chest. The risks, benefits and alternatives of proceeding with surgery were explained to the patient. Specifically, the risk of bleeding, need for blood transfusion, organ injury, nerve damage was discussed with the patient who understands and agrees to the above. Will coordinate with Dr Villavicencio' office. \par \par I have reviewed the patient's medical records and diagnostic images at the time of this office consultation and have made the following recommendation.\par Plan:\par 1. RVATS, robotic assisted, mobilization of mediastinal mass with Dr Villavicencio.

## 2020-08-12 NOTE — HISTORY OF PRESENT ILLNESS
[FreeTextEntry1] : 44 year old male, current smoker, 30+ pack-year smoker, PMHx PUD presented to the ED with shortness of breath and dry cough for 1-2 weeks was found to have mediastinal R thyroid mass that is pushing trachea and some emphysematous changes on CT chest. \par \par CT Chest 10/23/2018\par - solid and cystic mass 5.4 x 5.0 x 9.3cm, likely large exophytic thyroid nodule\par \par CT Chest with IV Contrast 8/1/20\par - 5.6 x 5.7 x 6.8cm large solid and cystic nodule extending from the inferior right lobe of the thyroid gland into the right superior mediastinum. \par - Negative for thoracic inlet, axillary, mediastinal or hilar adenopathy.

## 2020-08-12 NOTE — PHYSICAL EXAM
[General Appearance - Alert] : alert [General Appearance - In No Acute Distress] : in no acute distress [General Appearance - Well Developed] : well developed [General Appearance - Well Nourished] : well nourished [Hearing Threshold Finger Rub Not [place]] : hearing was normal [Outer Ear] : the ears and nose were normal in appearance [Sclera] : the sclera and conjunctiva were normal [Neck Appearance] : the appearance of the neck was normal [Neck Cervical Mass (___cm)] : no neck mass was observed [Jugular Venous Distention Increased] : there was no jugular-venous distention [] : no respiratory distress [Respiration, Rhythm And Depth] : normal respiratory rhythm and effort [Exaggerated Use Of Accessory Muscles For Inspiration] : no accessory muscle use [Apical Impulse] : the apical impulse was normal [Auscultation Breath Sounds / Voice Sounds] : lungs were clear to auscultation bilaterally [Heart Sounds] : normal S1 and S2 [Examination Of The Chest] : the chest was normal in appearance [2+] : left 2+ [Abdomen Soft] : soft [Abdomen Tenderness] : non-tender [Cervical Lymph Nodes Enlarged Posterior Bilaterally] : posterior cervical [Cervical Lymph Nodes Enlarged Anterior Bilaterally] : anterior cervical [No CVA Tenderness] : no ~M costovertebral angle tenderness [Abnormal Walk] : normal gait [Skin Color & Pigmentation] : normal skin color and pigmentation [Musculoskeletal - Swelling] : no joint swelling seen [Skin Turgor] : normal skin turgor [No Focal Deficits] : no focal deficits [Oriented To Time, Place, And Person] : oriented to person, place, and time

## 2020-08-12 NOTE — END OF VISIT
[FreeTextEntry3] : I, JOSETTE SHELL , am scribing for and in the presence of STEFAN SNOWDEN the following sections: history of present illness, past medical/family/surgical/family/social history, review of systems, vital signs, physical exam, and disposition.\par

## 2020-08-13 PROBLEM — Z72.89 ALCOHOL USE: Status: ACTIVE | Noted: 2020-08-12

## 2020-08-13 PROBLEM — F17.200 CURRENT SMOKER: Status: ACTIVE | Noted: 2020-08-12

## 2020-08-13 PROBLEM — Z78.9 CAFFEINE USE: Status: ACTIVE | Noted: 2020-08-13

## 2020-08-13 NOTE — REVIEW OF SYSTEMS
[As Noted in HPI] : as noted in HPI [Patient Intake Form Reviewed] : Patient intake form was reviewed [Negative] : Constitutional [FreeTextEntry1] : all other ROS negative

## 2020-08-13 NOTE — PHYSICAL EXAM
[Laryngoscopy Performed] : laryngoscopy was performed, see procedure section for findings [Midline] : trachea located in midline position [Normal] : no rashes [de-identified] : soft palpable thyroid lobe R>L on exam, without palpable nodule/mass

## 2020-08-13 NOTE — ASSESSMENT
[FreeTextEntry1] : 44M who presents with incidental finding of R low lobe thyroid mass extending to the mediastinum causing leftward tracheal deviation. \par \par Plan:\par - plan for OR with CT surgery (Right hemithyroidectomy with extension into the mediastinum/thoracic cavity by CT surg) will need frozen and FNM\par - pre-op clearance\par - Risks of surgery explained during office visit including but not limited to risk of bleeding, risk of infection, risk of injury to the vessels of the neck, risk of injury to the nerves of the neck, 10% risk of injury to the nerves that move the vocal cords, risk of injury to the parathyroid glands which would lead to potential need for calcium replacement after surgery, post op complications such as hematoma, surgical site infection, and prolonged hospital stay. pt verbalized understanding.\par \par

## 2020-08-13 NOTE — HISTORY OF PRESENT ILLNESS
[de-identified] : 44M, 30 pack year smoking history,  who presents per referral from CT surgery for incidental finding of large right low lobe thyroid mass. Patient reports last week he had SOB and cough which prompted him to go to [facility] ER. There, imaging was performed and he was found to have emphysema with overlying bronchitis and a R thyroid lower lobe mass that extended intrathoracically causing leftward tracheal deviation. He does admit to occasional globus sensation for >1 year. He denies any current SOB, neck pain, sore throat, odynophagia, dysphagia of solids or liquids, hoarseness. \par \par He does admit that when being worked up for a shoulder surgery there was imaging done showing the mass at that time and he was recommended to follow up with a surgeon, but decided not to since he was asymptomatic. \par \par No other ENT complaints. No pertinent FH.

## 2020-08-13 NOTE — PROCEDURE
[de-identified] : Procedure was explained to patient with all risks, benefits and alternatives, all questions answered. Patient was positioned sitting upright with chest out.  Bilateral nasal passages inspected with no erythema, edema, polyps, masses, lesions or purulent drainage. Scope was advanced via the oropharynx where no masses, lesions or obstruction was noted. Scope was then advanced to hypopharynx/larynx where no asymmetry, masses, lesions, pooled secretions, vocal cord dysmotility or stenosis was noted. Patient tolerated procedure well.\par

## 2020-08-21 ENCOUNTER — APPOINTMENT (OUTPATIENT)
Dept: PULMONOLOGY | Facility: CLINIC | Age: 45
End: 2020-08-21

## 2020-08-22 ENCOUNTER — LABORATORY RESULT (OUTPATIENT)
Age: 45
End: 2020-08-22

## 2020-08-24 VITALS
TEMPERATURE: 97 F | WEIGHT: 192.02 LBS | HEART RATE: 64 BPM | SYSTOLIC BLOOD PRESSURE: 136 MMHG | DIASTOLIC BLOOD PRESSURE: 87 MMHG | RESPIRATION RATE: 16 BRPM | OXYGEN SATURATION: 99 % | HEIGHT: 74 IN

## 2020-08-25 ENCOUNTER — RESULT REVIEW (OUTPATIENT)
Age: 45
End: 2020-08-25

## 2020-08-25 ENCOUNTER — APPOINTMENT (OUTPATIENT)
Dept: THORACIC SURGERY | Facility: HOSPITAL | Age: 45
End: 2020-08-25

## 2020-08-25 ENCOUNTER — INPATIENT (INPATIENT)
Facility: HOSPITAL | Age: 45
LOS: 3 days | Discharge: HOME | DRG: 625 | End: 2020-08-29
Attending: THORACIC SURGERY (CARDIOTHORACIC VASCULAR SURGERY) | Admitting: THORACIC SURGERY (CARDIOTHORACIC VASCULAR SURGERY)
Payer: COMMERCIAL

## 2020-08-25 DIAGNOSIS — Z98.890 OTHER SPECIFIED POSTPROCEDURAL STATES: Chronic | ICD-10-CM

## 2020-08-25 LAB
BASE EXCESS BLDA CALC-SCNC: -3.8 MMOL/L — LOW (ref -2–3)
CA-I BLDA-SCNC: 1.04 MMOL/L — LOW (ref 1.12–1.3)
COHGB MFR BLDA: 0.6 % — SIGNIFICANT CHANGE UP
GLUCOSE BLDC GLUCOMTR-MCNC: 153 MG/DL — HIGH (ref 70–99)
HCO3 BLDA-SCNC: 23 MMOL/L — SIGNIFICANT CHANGE UP (ref 21–28)
HGB BLDA-MCNC: 14.6 G/DL — SIGNIFICANT CHANGE UP (ref 13–17)
METHGB MFR BLDA: 0.3 % — SIGNIFICANT CHANGE UP
O2 CT VFR BLDA CALC: 19.8 ML/DL — SIGNIFICANT CHANGE UP (ref 15–23)
OXYHGB MFR BLDA: 96 % — SIGNIFICANT CHANGE UP (ref 94–100)
PCO2 BLDA: 47 MMHG — SIGNIFICANT CHANGE UP (ref 35–48)
PH BLDA: 7.3 — LOW (ref 7.35–7.45)
PO2 BLDA: 98 MMHG — SIGNIFICANT CHANGE UP (ref 83–108)
POTASSIUM BLDA-SCNC: 3.9 MMOL/L — SIGNIFICANT CHANGE UP (ref 3.5–4.9)
SAO2 % BLDA: 97 % — SIGNIFICANT CHANGE UP (ref 95–100)
SODIUM BLDA-SCNC: 138 MMOL/L — SIGNIFICANT CHANGE UP (ref 138–146)

## 2020-08-25 PROCEDURE — 99291 CRITICAL CARE FIRST HOUR: CPT

## 2020-08-25 PROCEDURE — 43235 EGD DIAGNOSTIC BRUSH WASH: CPT

## 2020-08-25 PROCEDURE — 39010 EXPLORATION OF CHEST: CPT

## 2020-08-25 PROCEDURE — S2900 ROBOTIC SURGICAL SYSTEM: CPT | Mod: NC

## 2020-08-25 PROCEDURE — 32662 THORACOSCOPY W/MEDIAST EXC: CPT

## 2020-08-25 PROCEDURE — 99292 CRITICAL CARE ADDL 30 MIN: CPT

## 2020-08-25 PROCEDURE — 88307 TISSUE EXAM BY PATHOLOGIST: CPT | Mod: 26

## 2020-08-25 PROCEDURE — 60210 PARTIAL THYROID EXCISION: CPT

## 2020-08-25 RX ORDER — CHLORHEXIDINE GLUCONATE 213 G/1000ML
5 SOLUTION TOPICAL EVERY 4 HOURS
Refills: 0 | Status: DISCONTINUED | OUTPATIENT
Start: 2020-08-26 | End: 2020-08-27

## 2020-08-25 RX ORDER — ESOMEPRAZOLE MAGNESIUM 40 MG/1
1 CAPSULE, DELAYED RELEASE ORAL
Qty: 0 | Refills: 0 | DISCHARGE

## 2020-08-25 RX ORDER — CEFAZOLIN SODIUM 1 G
2000 VIAL (EA) INJECTION EVERY 8 HOURS
Refills: 0 | Status: COMPLETED | OUTPATIENT
Start: 2020-08-26 | End: 2020-08-27

## 2020-08-25 RX ORDER — SODIUM CHLORIDE 9 MG/ML
1000 INJECTION, SOLUTION INTRAVENOUS
Refills: 0 | Status: DISCONTINUED | OUTPATIENT
Start: 2020-08-25 | End: 2020-08-25

## 2020-08-25 RX ORDER — HEPARIN SODIUM 5000 [USP'U]/ML
5000 INJECTION INTRAVENOUS; SUBCUTANEOUS EVERY 8 HOURS
Refills: 0 | Status: DISCONTINUED | OUTPATIENT
Start: 2020-08-25 | End: 2020-08-25

## 2020-08-25 RX ORDER — MEPERIDINE HYDROCHLORIDE 50 MG/ML
25 INJECTION INTRAMUSCULAR; INTRAVENOUS; SUBCUTANEOUS ONCE
Refills: 0 | Status: DISCONTINUED | OUTPATIENT
Start: 2020-08-25 | End: 2020-08-25

## 2020-08-25 RX ORDER — SODIUM CHLORIDE 9 MG/ML
1000 INJECTION INTRAMUSCULAR; INTRAVENOUS; SUBCUTANEOUS
Refills: 0 | Status: DISCONTINUED | OUTPATIENT
Start: 2020-08-25 | End: 2020-08-25

## 2020-08-25 RX ORDER — CEFAZOLIN SODIUM 1 G
2000 VIAL (EA) INJECTION EVERY 8 HOURS
Refills: 0 | Status: DISCONTINUED | OUTPATIENT
Start: 2020-08-25 | End: 2020-08-25

## 2020-08-25 RX ORDER — DEXTROSE 50 % IN WATER 50 %
15 SYRINGE (ML) INTRAVENOUS ONCE
Refills: 0 | Status: DISCONTINUED | OUTPATIENT
Start: 2020-08-25 | End: 2020-08-25

## 2020-08-25 RX ORDER — GLUCAGON INJECTION, SOLUTION 0.5 MG/.1ML
1 INJECTION, SOLUTION SUBCUTANEOUS ONCE
Refills: 0 | Status: DISCONTINUED | OUTPATIENT
Start: 2020-08-25 | End: 2020-08-25

## 2020-08-25 RX ORDER — CHLORHEXIDINE GLUCONATE 213 G/1000ML
5 SOLUTION TOPICAL EVERY 4 HOURS
Refills: 0 | Status: DISCONTINUED | OUTPATIENT
Start: 2020-08-25 | End: 2020-08-25

## 2020-08-25 RX ORDER — SODIUM CHLORIDE 9 MG/ML
1000 INJECTION INTRAMUSCULAR; INTRAVENOUS; SUBCUTANEOUS
Refills: 0 | Status: DISCONTINUED | OUTPATIENT
Start: 2020-08-26 | End: 2020-08-27

## 2020-08-25 RX ORDER — PANTOPRAZOLE SODIUM 20 MG/1
40 TABLET, DELAYED RELEASE ORAL EVERY 24 HOURS
Refills: 0 | Status: DISCONTINUED | OUTPATIENT
Start: 2020-08-26 | End: 2020-08-27

## 2020-08-25 RX ORDER — HEPARIN SODIUM 5000 [USP'U]/ML
5000 INJECTION INTRAVENOUS; SUBCUTANEOUS EVERY 8 HOURS
Refills: 0 | Status: DISCONTINUED | OUTPATIENT
Start: 2020-08-26 | End: 2020-08-29

## 2020-08-25 RX ORDER — INSULIN LISPRO 100/ML
VIAL (ML) SUBCUTANEOUS
Refills: 0 | Status: DISCONTINUED | OUTPATIENT
Start: 2020-08-25 | End: 2020-08-25

## 2020-08-25 RX ORDER — DEXTROSE 50 % IN WATER 50 %
25 SYRINGE (ML) INTRAVENOUS ONCE
Refills: 0 | Status: DISCONTINUED | OUTPATIENT
Start: 2020-08-25 | End: 2020-08-25

## 2020-08-25 RX ORDER — PANTOPRAZOLE SODIUM 20 MG/1
40 TABLET, DELAYED RELEASE ORAL DAILY
Refills: 0 | Status: DISCONTINUED | OUTPATIENT
Start: 2020-08-25 | End: 2020-08-25

## 2020-08-25 RX ORDER — DEXTROSE 50 % IN WATER 50 %
12.5 SYRINGE (ML) INTRAVENOUS ONCE
Refills: 0 | Status: DISCONTINUED | OUTPATIENT
Start: 2020-08-25 | End: 2020-08-25

## 2020-08-25 NOTE — H&P ADULT - NSHPPHYSICALEXAM_GEN_ALL_CORE
T(C): --  T(F): --  HR: --  BP: --  BP(mean): --  RR: --  SpO2: --    CONSTITUTIONAL:  WDWN                                                                    NEURO:   grossly intact                                                                                                             EYES:  PERRL                                                                                                ENMT:   supple and no lymphadenopathy                                                                                              CV:    RRR without murmur                                                                                                   RESPIRATORY:  no wheezing and no rhonchi                                                                                 GI:  BM and BS are intact                                                                                                       : deferred                                                                                 MUSKULOSKELETAL:    no deformity                                                                      SKIN / BREAST: warm

## 2020-08-25 NOTE — H&P ADULT - NSHPREVIEWOFSYSTEMS_GEN_ALL_CORE
CONSTITUTIONAL: denies Fevers / chills, sweats, fatigue, weight loss, weight gain                              NEURO: denies parathesias, seizures, syncope, confusion                                                                                 EYES: denies Blurry vision, discharge, pain, loss of vision                                                                                 ENMT: denies Difficulty hearing, vertigo, dysphagia, epistaxis, recent dental work                                      CV: positive for Chest pain denies palpitations, DORANTES, orthopnea                                                                                          RESPIRATORY: positive for SOB and dry cough denies Wheezing, hemoptysis                                                               GI: denies Nausea, vomiting, diarrhea, constipation, melena                                                                        : denies Hematuria, dysuria, urgency, incontinence                                                                                       MUSKULOSKELETAL: denies arthritis, joint swelling, muscle weakness                                                             SKIN/BREAST: denies rash, itching, hair loss, masses                                                                                             PSYCH: denies depression, anxiety, suicidal ideation                                                                                               HEME/LYMPH: denies bruises easily, enlarged lymph nodes, tender lymph nodes                                        ENDOCRINE: denies cold intolerance, heat intolerance, polydipsia

## 2020-08-25 NOTE — PROGRESS NOTE ADULT - SUBJECTIVE AND OBJECTIVE BOX
CTICU  CRITICAL  CARE  attending     Hand off received 					   Pertinent clinical, laboratory, radiographic, hemodynamic, echocardiographic, respiratory data, microbiologic data and chart were reviewed and analyzed frequently throughout the course of the day and night    Patient seen and examined with CTS/ SH attending at bedside    This 44 year-old male, current smoker (30 pack year) with PMH significant for PUD and recent findings of mediastinal thyroid mass presented to this admission for surgical intervention.  Patient initially presented to the ED with complain of shortness of breath and several week history of dry cough.  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  A CT chest with IV contrast (8/1/20) has shown 5.6x5.7x6.8cm large solid and cystic nodule extending from the inferior right lobe of the thyroid gland into the right superior mediastinum.  Negative for thoracic inlet, axillary, mediastinal or hilar adenopathy.  Patient denies fatigues, malaise, weight changes, syncopes, headache, palpitation, shortness of breath, nausea, vomiting, or diarrhea.      FAMILY HISTORY:  No pertinent family history in first degree relatives  PAST MEDICAL & SURGICAL HISTORY:  Tracheal deviation  PUD (peptic ulcer disease)  Status post labral repair of shoulder: (R) Shoulder        14 system review was unremarkable    Vital signs, hemodynamic and respiratory parameters were reviewed from the bedside nursing flow sheet.  ICU Vital Signs Last 24 Hrs  T(C): 36.1 (26 Aug 2020 01:01), Max: 36.1 (26 Aug 2020 01:01)  T(F): 96.9 (26 Aug 2020 01:01), Max: 96.9 (26 Aug 2020 01:01)  HR: 83 (26 Aug 2020 01:30) (82 - 101)  BP: --  BP(mean): --  ABP: 148/71 (26 Aug 2020 01:30) (118/62 - 156/74)  ABP(mean): 95 (26 Aug 2020 01:30) (84 - 109)  RR: 16 (26 Aug 2020 01:30) (14 - 20)  SpO2: 100% (26 Aug 2020 01:30) (100% - 100%)    Adult Advanced Hemodynamics Last 24 Hrs  CVP(mm Hg): --  CVP(cm H2O): --  CO: --  CI: --  PA: --  PA(mean): --  PCWP: --  SVR: --  SVRI: --  PVR: --  PVRI: --, ABG - ( 26 Aug 2020 00:31 )  pH, Arterial: 7.33  pH, Blood: x     /  pCO2: 44    /  pO2: 226   / HCO3: 23    / Base Excess: -3.2  /  SaO2: 99                Mode: AC/ CMV (Assist Control/ Continuous Mandatory Ventilation)  RR (machine): 12  TV (machine): 550  FiO2: 40  PEEP: 5  ITime: 1.2  MAP: 7  PIP: 14    Intake and output was reviewed and the fluid balance was calculated  Daily     Daily   I&O's Summary    25 Aug 2020 07:01  -  26 Aug 2020 01:55  --------------------------------------------------------  IN: 510 mL / OUT: 120 mL / NET: 390 mL        All lines and drain sites were assessed      Neuro: Follows commands.  Moves all 4 extremities.  Neck: No JVD.  CVS: S1, S2, No S3.  Lungs: Good air entry bilaterally.  Abd: Soft. No tenderness. + Bowel sounds.  Vascular: + DP/PT.  Extremities: No edema.  Lymphatic: Normal.  Skin: No abnormalities.      labs  CBC Full  -  ( 26 Aug 2020 00:31 )  WBC Count : 11.57 K/uL  RBC Count : 4.33 M/uL  Hemoglobin : 12.9 g/dL  Hematocrit : 38.6 %  Platelet Count - Automated : 242 K/uL  Mean Cell Volume : 89.1 fl  Mean Cell Hemoglobin : 29.8 pg  Mean Cell Hemoglobin Concentration : 33.4 gm/dL  Auto Neutrophil # : 9.83 K/uL  Auto Lymphocyte # : 0.70 K/uL  Auto Monocyte # : 0.98 K/uL  Auto Eosinophil # : 0.00 K/uL  Auto Basophil # : 0.01 K/uL  Auto Neutrophil % : 84.9 %  Auto Lymphocyte % : 6.1 %  Auto Monocyte % : 8.5 %  Auto Eosinophil % : 0.0 %  Auto Basophil % : 0.1 %    08-26    137  |  104  |  13  ----------------------------<  172<H>  5.6<H>   |  22  |  1.21    Ca    8.4      26 Aug 2020 00:31  Phos  4.7     08-26  Mg     1.4     08-26    TPro  6.0  /  Alb  3.7  /  TBili  0.9  /  DBili  x   /  AST  18  /  ALT  11  /  AlkPhos  61  08-26    PT/INR - ( 26 Aug 2020 00:31 )   PT: 14.2 sec;   INR: 1.19          PTT - ( 26 Aug 2020 00:31 )  PTT:27.8 sec  The current medications were reviewed   MEDICATIONS  (STANDING):  albumin human  5% IVPB 250 milliLiter(s) IV Intermittent every 10 minutes  ceFAZolin   IVPB 2000 milliGRAM(s) IV Intermittent every 8 hours  chlorhexidine 0.12% Liquid 5 milliLiter(s) Oral Mucosa every 4 hours  heparin   Injectable 5000 Unit(s) SubCutaneous every 8 hours  magnesium sulfate  IVPB 2 Gram(s) IV Intermittent once  pantoprazole  Injectable 40 milliGRAM(s) IV Push every 24 hours  pantoprazole  Injectable 40 milliGRAM(s) IV Push daily  propofol Infusion 45 MICROgram(s)/kG/Min (23.5 mL/Hr) IV Continuous <Continuous>  sodium chloride 0.9%. 1000 milliLiter(s) (10 mL/Hr) IV Continuous <Continuous>          Patient is a 44y old  Male who presents with mediastinal mass   S/P thyroidectomy.  Hyperkalemia  Hypomagnesemia.   Hemodynamically stable.  Good oxygenation.  Fair urine out put.  Overall doing well.      My plan includes :  WEAN vent as tolerated.  Statin and Betablocker.  Close hemodynamic, ventilatory and drain monitoring and management  Monitor for arrhythmias and monitor parameters for organ perfusion  Monitor neurologic status  Monitor renal function.  Head of the bed should remain elevated to 45 deg .   Chest PT and IS will be encouraged  Monitor adequacy of oxygenation and ventilation and attempt to wean oxygen  Nutritional goals will be met using po eventually , ensure adequate caloric intake and monitor the same  Stress ulcer and VTE prophylaxis will be achieved    OPTIMIZE Glycemic control.  Electrolytes have been repleted as necessary and wound care has been carried out. Pain control has been achieved.   Aggressive physical therapy and early mobility and ambulation goals will be met   The family was updated about the course and plan  CRITICAL CARE TIME SPENT in evaluation and management, reassessments, review and interpretation of labs and x-rays, ventilator and hemodynamic management, formulating a plan and coordinating care: ___90____ MIN.  Time does not include procedural time.  CTICU ATTENDING     					    Kwesi Lind MD

## 2020-08-25 NOTE — H&P ADULT - HISTORY OF PRESENT ILLNESS
This 44 year-old male, current smoker (30 pack year) with PMH significant for PUD and recent findings of mediastinal thyroid mass presented to this admission for surgical intervention.  Patient initially presented to the ED with complain of shortness of breath and several week history of dry cough.  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  A CT chest with IV contrast (8/1/20) has shown 5.6x5.7x6.8cm large solid and cystic nodule extending from the inferior right lobe of the thyroid gland into the right superior mediastinum.  Negative for thoracic inlet, axillary, mediastinal or hilar adenopathy.  Patient denies fatigues, malaise, weight changes, syncopes, headache, palpitation, shortness of breath, nausea, vomiting, or diarrhea.    Upon arrival, patient is afebrile.  He is not in acute distress and prepped for surgical intervention.

## 2020-08-25 NOTE — BRIEF OPERATIVE NOTE - COMMENTS
Dr. Rey was the first assistant for this case including but not limited to opening, port placement/docking, robotic instrumentation, chest tube placement, and closure.    I was present for this procedure and participated as first assistant as described by the PA above, unless otherwise noted below.

## 2020-08-25 NOTE — H&P ADULT - NSHPLABSRESULTS_GEN_ALL_CORE
CAROTID U/S:  n/a  CXR:  < from: Xray Chest 1 View AP/PA (08.01.20 @ 01:26) >    Frontal examination of the chest demonstrates superior sternal mass with deviation of the trachea to the left. Correlation with CT examination recommended. Heart is within normal limits in transverse diameter. Mild dextroscoliosis thoracic spine.    Impression: Superior mediastinal mass noted with deviation of the trachea to the left. Correlation with CT examination recommended    CT Scan:  FINDINGS: CT of the chest was performed with the administration of intravenous contrast. Reconstructions were performed in the sagittal and coronal planes. Comparison is made to prior study dated 8/1/2020.    Evaluation of the chest demonstrates a large solid and cystic nodule extending from the inferiorright lobe of the thyroid gland measuring 5.6 x 5.7 x 6.8 cm with underlying heterogeneous nodularity of the thyroid gland. There is normal heart size. There is no pleural and no pericardial effusion. Negative for thoracic inlet, axillary, mediastinal or hilar adenopathy. There is no nallely central pulmonary embolus. There is no pleural and no pericardial effusion. Evaluation of the lung parenchyma demonstrates moderate paraseptal emphysematous change. No endobronchial lesion. No pulmonary consolidation or mass. Scattered regions of bronchiolar impaction. Scattered 2 to 3 mm subpleural pulmonary micronodules without suspicious feature, for which follow-up is not required, including in the presence of risk factors. Evaluation of the abdomen demonstrates no significant abnormality. Osseous structures are unremarkable.    EKG:  n/a  TTE / MERCEDES:  n/a  Cardiac Cath:  n/a

## 2020-08-25 NOTE — H&P ADULT - ASSESSMENT
This 44 year-old male, current smoker (30 pack year) with PMH significant for PUD and recent findings of mediastinal thyroid mass presented to this admission for surgical intervention.  Patient initially presented to the ED with complain of shortness of breath and several week history of dry cough.  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  A CT chest with IV contrast (8/1/20) has shown 5.6x5.7x6.8cm large solid and cystic nodule extending from the inferior right lobe of the thyroid gland into the right superior mediastinum.  Negative for thoracic inlet, axillary, mediastinal or hilar adenopathy.  Patient denies fatigues, malaise, weight changes, syncopes, headache, palpitation, shortness of breath, nausea, vomiting, or diarrhea.    Upon arrival, patient is afebrile.  He is not in acute distress and prepped for surgical intervention.     Neurovascular: No delirium.   - Pain well controlled with current regimen    Cardiovascular: stable   - post op cardiac monitor closely     Respiratory: 02 Sat = 98% on RA.  -If on oxygen wean to RA from for O2 Sat > 93%.  -Encourage C+DB and Use of IS 10x / hr while awake.  - daily CXR.    GI: Stable.  - PO Diet post op     Renal / : BUN/Cr 16/1.16  - continue to monitor for I&Os  - continue monitor for renal function      Endocrine: thyroid mass  - check TSH.    Hematologic: sable with H/H 16.1/48.4  - type and screen and blood product holding for procedures   - continue to monitor for CBC and coagulation Panel.    ID: afebrile with WBC 7.64  - continue to monitor for Tempature and CBC.  - Observe for SIRS/Sepsis Syndrome.    Prophylaxis:  - DVT prophylaxis with 5000 SubQ Heparin q8h and SCD's    Disposition:  - surgical intervention   - ICU recovery

## 2020-08-25 NOTE — H&P ADULT - NSHPSOCIALHISTORY_GEN_ALL_CORE
Smoker: daily smoking   ETOH use: no  Ilicit Drug use: no  Occupation: no contributory   Assisted device use (Cane / Walker): n/a  Live with: non contributory

## 2020-08-25 NOTE — BRIEF OPERATIVE NOTE - NSICDXBRIEFPROCEDURE_GEN_ALL_CORE_FT
PROCEDURES:  Excision of mass of thyroid 25-Aug-2020 18:10:05  Ignacio Nguyen  Surgical removal of mass of thyroid 25-Aug-2020 18:09:18 right robotic assisted thoracoscopy approach Ignacio Nguyen PROCEDURES:  Surgical removal of mass of thyroid 25-Aug-2020 18:09:18 right robotic assisted thoracoscopy approach Ignacio Nguyen

## 2020-08-25 NOTE — BRIEF OPERATIVE NOTE - ASSISTANT(S)
MD Krissy and MD Sharon, PATTI Nguyen and PATTI Camp MD Krissy and MD Sharon, PATTI Nguyen and PATTI Byrne, Saud MCGEE

## 2020-08-26 LAB
ALBUMIN SERPL ELPH-MCNC: 3.7 G/DL — SIGNIFICANT CHANGE UP (ref 3.3–5)
ALBUMIN SERPL ELPH-MCNC: 3.7 G/DL — SIGNIFICANT CHANGE UP (ref 3.3–5)
ALBUMIN SERPL ELPH-MCNC: 3.8 G/DL — SIGNIFICANT CHANGE UP (ref 3.3–5)
ALP SERPL-CCNC: 54 U/L — SIGNIFICANT CHANGE UP (ref 40–120)
ALP SERPL-CCNC: 55 U/L — SIGNIFICANT CHANGE UP (ref 40–120)
ALP SERPL-CCNC: 61 U/L — SIGNIFICANT CHANGE UP (ref 40–120)
ALT FLD-CCNC: 11 U/L — SIGNIFICANT CHANGE UP (ref 10–45)
ALT FLD-CCNC: 11 U/L — SIGNIFICANT CHANGE UP (ref 10–45)
ALT FLD-CCNC: 12 U/L — SIGNIFICANT CHANGE UP (ref 10–45)
ANION GAP SERPL CALC-SCNC: 10 MMOL/L — SIGNIFICANT CHANGE UP (ref 5–17)
ANION GAP SERPL CALC-SCNC: 11 MMOL/L — SIGNIFICANT CHANGE UP (ref 5–17)
ANION GAP SERPL CALC-SCNC: 11 MMOL/L — SIGNIFICANT CHANGE UP (ref 5–17)
ANION GAP SERPL CALC-SCNC: 9 MMOL/L — SIGNIFICANT CHANGE UP (ref 5–17)
APTT BLD: 27.1 SEC — LOW (ref 27.5–35.5)
APTT BLD: 27.8 SEC — SIGNIFICANT CHANGE UP (ref 27.5–35.5)
APTT BLD: 29.3 SEC — SIGNIFICANT CHANGE UP (ref 27.5–35.5)
AST SERPL-CCNC: 18 U/L — SIGNIFICANT CHANGE UP (ref 10–40)
AST SERPL-CCNC: 24 U/L — SIGNIFICANT CHANGE UP (ref 10–40)
AST SERPL-CCNC: 24 U/L — SIGNIFICANT CHANGE UP (ref 10–40)
BASOPHILS # BLD AUTO: 0 K/UL — SIGNIFICANT CHANGE UP (ref 0–0.2)
BASOPHILS # BLD AUTO: 0.01 K/UL — SIGNIFICANT CHANGE UP (ref 0–0.2)
BASOPHILS NFR BLD AUTO: 0 % — SIGNIFICANT CHANGE UP (ref 0–2)
BASOPHILS NFR BLD AUTO: 0.1 % — SIGNIFICANT CHANGE UP (ref 0–2)
BILIRUB SERPL-MCNC: 0.6 MG/DL — SIGNIFICANT CHANGE UP (ref 0.2–1.2)
BILIRUB SERPL-MCNC: 0.7 MG/DL — SIGNIFICANT CHANGE UP (ref 0.2–1.2)
BILIRUB SERPL-MCNC: 0.9 MG/DL — SIGNIFICANT CHANGE UP (ref 0.2–1.2)
BUN SERPL-MCNC: 12 MG/DL — SIGNIFICANT CHANGE UP (ref 7–23)
BUN SERPL-MCNC: 13 MG/DL — SIGNIFICANT CHANGE UP (ref 7–23)
BUN SERPL-MCNC: 14 MG/DL — SIGNIFICANT CHANGE UP (ref 7–23)
BUN SERPL-MCNC: 14 MG/DL — SIGNIFICANT CHANGE UP (ref 7–23)
CALCIUM SERPL-MCNC: 8.4 MG/DL — SIGNIFICANT CHANGE UP (ref 8.4–10.5)
CALCIUM SERPL-MCNC: 8.4 MG/DL — SIGNIFICANT CHANGE UP (ref 8.4–10.5)
CALCIUM SERPL-MCNC: 8.5 MG/DL — SIGNIFICANT CHANGE UP (ref 8.4–10.5)
CALCIUM SERPL-MCNC: 8.6 MG/DL — SIGNIFICANT CHANGE UP (ref 8.4–10.5)
CHLORIDE SERPL-SCNC: 102 MMOL/L — SIGNIFICANT CHANGE UP (ref 96–108)
CHLORIDE SERPL-SCNC: 103 MMOL/L — SIGNIFICANT CHANGE UP (ref 96–108)
CHLORIDE SERPL-SCNC: 103 MMOL/L — SIGNIFICANT CHANGE UP (ref 96–108)
CHLORIDE SERPL-SCNC: 104 MMOL/L — SIGNIFICANT CHANGE UP (ref 96–108)
CO2 SERPL-SCNC: 22 MMOL/L — SIGNIFICANT CHANGE UP (ref 22–31)
CO2 SERPL-SCNC: 23 MMOL/L — SIGNIFICANT CHANGE UP (ref 22–31)
CO2 SERPL-SCNC: 25 MMOL/L — SIGNIFICANT CHANGE UP (ref 22–31)
CO2 SERPL-SCNC: 26 MMOL/L — SIGNIFICANT CHANGE UP (ref 22–31)
CREAT SERPL-MCNC: 0.9 MG/DL — SIGNIFICANT CHANGE UP (ref 0.5–1.3)
CREAT SERPL-MCNC: 1.02 MG/DL — SIGNIFICANT CHANGE UP (ref 0.5–1.3)
CREAT SERPL-MCNC: 1.04 MG/DL — SIGNIFICANT CHANGE UP (ref 0.5–1.3)
CREAT SERPL-MCNC: 1.21 MG/DL — SIGNIFICANT CHANGE UP (ref 0.5–1.3)
EOSINOPHIL # BLD AUTO: 0 K/UL — SIGNIFICANT CHANGE UP (ref 0–0.5)
EOSINOPHIL # BLD AUTO: 0 K/UL — SIGNIFICANT CHANGE UP (ref 0–0.5)
EOSINOPHIL NFR BLD AUTO: 0 % — SIGNIFICANT CHANGE UP (ref 0–6)
EOSINOPHIL NFR BLD AUTO: 0 % — SIGNIFICANT CHANGE UP (ref 0–6)
GAS PNL BLDA: SIGNIFICANT CHANGE UP
GLUCOSE SERPL-MCNC: 104 MG/DL — HIGH (ref 70–99)
GLUCOSE SERPL-MCNC: 119 MG/DL — HIGH (ref 70–99)
GLUCOSE SERPL-MCNC: 140 MG/DL — HIGH (ref 70–99)
GLUCOSE SERPL-MCNC: 172 MG/DL — HIGH (ref 70–99)
HCT VFR BLD CALC: 34.4 % — LOW (ref 39–50)
HCT VFR BLD CALC: 34.6 % — LOW (ref 39–50)
HCT VFR BLD CALC: 36.7 % — LOW (ref 39–50)
HCT VFR BLD CALC: 38.6 % — LOW (ref 39–50)
HGB BLD-MCNC: 11.6 G/DL — LOW (ref 13–17)
HGB BLD-MCNC: 11.7 G/DL — LOW (ref 13–17)
HGB BLD-MCNC: 12.1 G/DL — LOW (ref 13–17)
HGB BLD-MCNC: 12.9 G/DL — LOW (ref 13–17)
IMM GRANULOCYTES NFR BLD AUTO: 0.4 % — SIGNIFICANT CHANGE UP (ref 0–1.5)
INR BLD: 1.19 — HIGH (ref 0.88–1.16)
INR BLD: 1.19 — HIGH (ref 0.88–1.16)
INR BLD: 1.31 — HIGH (ref 0.88–1.16)
LACTATE SERPL-SCNC: 2.1 MMOL/L — HIGH (ref 0.5–2)
LACTATE SERPL-SCNC: 3.4 MMOL/L — HIGH (ref 0.5–2)
LYMPHOCYTES # BLD AUTO: 0.7 K/UL — LOW (ref 1–3.3)
LYMPHOCYTES # BLD AUTO: 1.13 K/UL — SIGNIFICANT CHANGE UP (ref 1–3.3)
LYMPHOCYTES # BLD AUTO: 6.1 % — LOW (ref 13–44)
LYMPHOCYTES # BLD AUTO: 8 % — LOW (ref 13–44)
MAGNESIUM SERPL-MCNC: 1.4 MG/DL — LOW (ref 1.6–2.6)
MAGNESIUM SERPL-MCNC: 2 MG/DL — SIGNIFICANT CHANGE UP (ref 1.6–2.6)
MAGNESIUM SERPL-MCNC: 2.1 MG/DL — SIGNIFICANT CHANGE UP (ref 1.6–2.6)
MAGNESIUM SERPL-MCNC: 2.1 MG/DL — SIGNIFICANT CHANGE UP (ref 1.6–2.6)
MCHC RBC-ENTMCNC: 29.3 PG — SIGNIFICANT CHANGE UP (ref 27–34)
MCHC RBC-ENTMCNC: 29.4 PG — SIGNIFICANT CHANGE UP (ref 27–34)
MCHC RBC-ENTMCNC: 29.6 PG — SIGNIFICANT CHANGE UP (ref 27–34)
MCHC RBC-ENTMCNC: 29.8 PG — SIGNIFICANT CHANGE UP (ref 27–34)
MCHC RBC-ENTMCNC: 33 GM/DL — SIGNIFICANT CHANGE UP (ref 32–36)
MCHC RBC-ENTMCNC: 33.4 GM/DL — SIGNIFICANT CHANGE UP (ref 32–36)
MCHC RBC-ENTMCNC: 33.5 GM/DL — SIGNIFICANT CHANGE UP (ref 32–36)
MCHC RBC-ENTMCNC: 34 GM/DL — SIGNIFICANT CHANGE UP (ref 32–36)
MCV RBC AUTO: 87.1 FL — SIGNIFICANT CHANGE UP (ref 80–100)
MCV RBC AUTO: 87.8 FL — SIGNIFICANT CHANGE UP (ref 80–100)
MCV RBC AUTO: 88.9 FL — SIGNIFICANT CHANGE UP (ref 80–100)
MCV RBC AUTO: 89.1 FL — SIGNIFICANT CHANGE UP (ref 80–100)
MONOCYTES # BLD AUTO: 0.98 K/UL — HIGH (ref 0–0.9)
MONOCYTES # BLD AUTO: 0.99 K/UL — HIGH (ref 0–0.9)
MONOCYTES NFR BLD AUTO: 7 % — SIGNIFICANT CHANGE UP (ref 2–14)
MONOCYTES NFR BLD AUTO: 8.5 % — SIGNIFICANT CHANGE UP (ref 2–14)
NEUTROPHILS # BLD AUTO: 11.83 K/UL — HIGH (ref 1.8–7.4)
NEUTROPHILS # BLD AUTO: 9.83 K/UL — HIGH (ref 1.8–7.4)
NEUTROPHILS NFR BLD AUTO: 75 % — SIGNIFICANT CHANGE UP (ref 43–77)
NEUTROPHILS NFR BLD AUTO: 84.9 % — HIGH (ref 43–77)
NRBC # BLD: 0 /100 WBCS — SIGNIFICANT CHANGE UP (ref 0–0)
NRBC # BLD: SIGNIFICANT CHANGE UP /100 WBCS (ref 0–0)
PHOSPHATE SERPL-MCNC: 2.2 MG/DL — LOW (ref 2.5–4.5)
PHOSPHATE SERPL-MCNC: 3.9 MG/DL — SIGNIFICANT CHANGE UP (ref 2.5–4.5)
PHOSPHATE SERPL-MCNC: 4.4 MG/DL — SIGNIFICANT CHANGE UP (ref 2.5–4.5)
PHOSPHATE SERPL-MCNC: 4.7 MG/DL — HIGH (ref 2.5–4.5)
PLATELET # BLD AUTO: 200 K/UL — SIGNIFICANT CHANGE UP (ref 150–400)
PLATELET # BLD AUTO: 226 K/UL — SIGNIFICANT CHANGE UP (ref 150–400)
PLATELET # BLD AUTO: 228 K/UL — SIGNIFICANT CHANGE UP (ref 150–400)
PLATELET # BLD AUTO: 242 K/UL — SIGNIFICANT CHANGE UP (ref 150–400)
POTASSIUM SERPL-MCNC: 3.5 MMOL/L — SIGNIFICANT CHANGE UP (ref 3.5–5.3)
POTASSIUM SERPL-MCNC: 4 MMOL/L — SIGNIFICANT CHANGE UP (ref 3.5–5.3)
POTASSIUM SERPL-MCNC: 4.5 MMOL/L — SIGNIFICANT CHANGE UP (ref 3.5–5.3)
POTASSIUM SERPL-MCNC: 5.6 MMOL/L — HIGH (ref 3.5–5.3)
POTASSIUM SERPL-SCNC: 3.5 MMOL/L — SIGNIFICANT CHANGE UP (ref 3.5–5.3)
POTASSIUM SERPL-SCNC: 4 MMOL/L — SIGNIFICANT CHANGE UP (ref 3.5–5.3)
POTASSIUM SERPL-SCNC: 4.5 MMOL/L — SIGNIFICANT CHANGE UP (ref 3.5–5.3)
POTASSIUM SERPL-SCNC: 5.6 MMOL/L — HIGH (ref 3.5–5.3)
PROT SERPL-MCNC: 6 G/DL — SIGNIFICANT CHANGE UP (ref 6–8.3)
PROTHROM AB SERPL-ACNC: 14.2 SEC — HIGH (ref 10.6–13.6)
PROTHROM AB SERPL-ACNC: 14.2 SEC — HIGH (ref 10.6–13.6)
PROTHROM AB SERPL-ACNC: 15.5 SEC — HIGH (ref 10.6–13.6)
RBC # BLD: 3.94 M/UL — LOW (ref 4.2–5.8)
RBC # BLD: 3.95 M/UL — LOW (ref 4.2–5.8)
RBC # BLD: 4.13 M/UL — LOW (ref 4.2–5.8)
RBC # BLD: 4.33 M/UL — SIGNIFICANT CHANGE UP (ref 4.2–5.8)
RBC # FLD: 13.9 % — SIGNIFICANT CHANGE UP (ref 10.3–14.5)
RBC # FLD: 14.1 % — SIGNIFICANT CHANGE UP (ref 10.3–14.5)
RBC # FLD: 14.2 % — SIGNIFICANT CHANGE UP (ref 10.3–14.5)
RBC # FLD: 14.3 % — SIGNIFICANT CHANGE UP (ref 10.3–14.5)
SODIUM SERPL-SCNC: 136 MMOL/L — SIGNIFICANT CHANGE UP (ref 135–145)
SODIUM SERPL-SCNC: 137 MMOL/L — SIGNIFICANT CHANGE UP (ref 135–145)
SODIUM SERPL-SCNC: 137 MMOL/L — SIGNIFICANT CHANGE UP (ref 135–145)
SODIUM SERPL-SCNC: 139 MMOL/L — SIGNIFICANT CHANGE UP (ref 135–145)
WBC # BLD: 10.12 K/UL — SIGNIFICANT CHANGE UP (ref 3.8–10.5)
WBC # BLD: 11.57 K/UL — HIGH (ref 3.8–10.5)
WBC # BLD: 14.08 K/UL — HIGH (ref 3.8–10.5)
WBC # BLD: 9.51 K/UL — SIGNIFICANT CHANGE UP (ref 3.8–10.5)
WBC # FLD AUTO: 10.12 K/UL — SIGNIFICANT CHANGE UP (ref 3.8–10.5)
WBC # FLD AUTO: 11.57 K/UL — HIGH (ref 3.8–10.5)
WBC # FLD AUTO: 14.08 K/UL — HIGH (ref 3.8–10.5)
WBC # FLD AUTO: 9.51 K/UL — SIGNIFICANT CHANGE UP (ref 3.8–10.5)

## 2020-08-26 PROCEDURE — 71045 X-RAY EXAM CHEST 1 VIEW: CPT | Mod: 26

## 2020-08-26 PROCEDURE — 99291 CRITICAL CARE FIRST HOUR: CPT

## 2020-08-26 PROCEDURE — 99292 CRITICAL CARE ADDL 30 MIN: CPT

## 2020-08-26 RX ORDER — PROPOFOL 10 MG/ML
45 INJECTION, EMULSION INTRAVENOUS
Qty: 1000 | Refills: 0 | Status: DISCONTINUED | OUTPATIENT
Start: 2020-08-26 | End: 2020-08-26

## 2020-08-26 RX ORDER — ACETAMINOPHEN 500 MG
1000 TABLET ORAL ONCE
Refills: 0 | Status: COMPLETED | OUTPATIENT
Start: 2020-08-26 | End: 2020-08-26

## 2020-08-26 RX ORDER — OXYCODONE AND ACETAMINOPHEN 5; 325 MG/1; MG/1
1 TABLET ORAL EVERY 6 HOURS
Refills: 0 | Status: DISCONTINUED | OUTPATIENT
Start: 2020-08-26 | End: 2020-08-29

## 2020-08-26 RX ORDER — CHLORHEXIDINE GLUCONATE 213 G/1000ML
15 SOLUTION TOPICAL EVERY 12 HOURS
Refills: 0 | Status: DISCONTINUED | OUTPATIENT
Start: 2020-08-26 | End: 2020-08-27

## 2020-08-26 RX ORDER — POTASSIUM PHOSPHATE, MONOBASIC POTASSIUM PHOSPHATE, DIBASIC 236; 224 MG/ML; MG/ML
15 INJECTION, SOLUTION INTRAVENOUS ONCE
Refills: 0 | Status: COMPLETED | OUTPATIENT
Start: 2020-08-26 | End: 2020-08-26

## 2020-08-26 RX ORDER — HYDRALAZINE HCL 50 MG
5 TABLET ORAL ONCE
Refills: 0 | Status: COMPLETED | OUTPATIENT
Start: 2020-08-26 | End: 2020-08-26

## 2020-08-26 RX ORDER — DEXMEDETOMIDINE HYDROCHLORIDE IN 0.9% SODIUM CHLORIDE 4 UG/ML
0.5 INJECTION INTRAVENOUS
Qty: 200 | Refills: 0 | Status: DISCONTINUED | OUTPATIENT
Start: 2020-08-26 | End: 2020-08-26

## 2020-08-26 RX ORDER — SODIUM CHLORIDE 9 MG/ML
1000 INJECTION, SOLUTION INTRAVENOUS ONCE
Refills: 0 | Status: COMPLETED | OUTPATIENT
Start: 2020-08-26 | End: 2020-08-26

## 2020-08-26 RX ORDER — MAGNESIUM SULFATE 500 MG/ML
2 VIAL (ML) INJECTION ONCE
Refills: 0 | Status: COMPLETED | OUTPATIENT
Start: 2020-08-26 | End: 2020-08-26

## 2020-08-26 RX ORDER — PANTOPRAZOLE SODIUM 20 MG/1
40 TABLET, DELAYED RELEASE ORAL DAILY
Refills: 0 | Status: DISCONTINUED | OUTPATIENT
Start: 2020-08-26 | End: 2020-08-26

## 2020-08-26 RX ORDER — HYDROMORPHONE HYDROCHLORIDE 2 MG/ML
0.5 INJECTION INTRAMUSCULAR; INTRAVENOUS; SUBCUTANEOUS ONCE
Refills: 0 | Status: DISCONTINUED | OUTPATIENT
Start: 2020-08-26 | End: 2020-08-26

## 2020-08-26 RX ORDER — ALBUMIN HUMAN 25 %
250 VIAL (ML) INTRAVENOUS
Refills: 0 | Status: COMPLETED | OUTPATIENT
Start: 2020-08-26 | End: 2020-08-26

## 2020-08-26 RX ORDER — METOPROLOL TARTRATE 50 MG
5 TABLET ORAL ONCE
Refills: 0 | Status: COMPLETED | OUTPATIENT
Start: 2020-08-26 | End: 2020-08-26

## 2020-08-26 RX ORDER — FUROSEMIDE 40 MG
20 TABLET ORAL ONCE
Refills: 0 | Status: COMPLETED | OUTPATIENT
Start: 2020-08-26 | End: 2020-08-26

## 2020-08-26 RX ORDER — FENTANYL CITRATE 50 UG/ML
25 INJECTION INTRAVENOUS ONCE
Refills: 0 | Status: DISCONTINUED | OUTPATIENT
Start: 2020-08-26 | End: 2020-08-26

## 2020-08-26 RX ORDER — METOPROLOL TARTRATE 50 MG
5 TABLET ORAL EVERY 6 HOURS
Refills: 0 | Status: DISCONTINUED | OUTPATIENT
Start: 2020-08-26 | End: 2020-08-27

## 2020-08-26 RX ORDER — KETOROLAC TROMETHAMINE 30 MG/ML
30 SYRINGE (ML) INJECTION EVERY 6 HOURS
Refills: 0 | Status: DISCONTINUED | OUTPATIENT
Start: 2020-08-26 | End: 2020-08-27

## 2020-08-26 RX ORDER — ACETAMINOPHEN 500 MG
650 TABLET ORAL EVERY 6 HOURS
Refills: 0 | Status: DISCONTINUED | OUTPATIENT
Start: 2020-08-26 | End: 2020-08-29

## 2020-08-26 RX ADMIN — HYDROMORPHONE HYDROCHLORIDE 0.5 MILLIGRAM(S): 2 INJECTION INTRAMUSCULAR; INTRAVENOUS; SUBCUTANEOUS at 09:30

## 2020-08-26 RX ADMIN — Medication 125 MILLILITER(S): at 00:55

## 2020-08-26 RX ADMIN — HEPARIN SODIUM 5000 UNIT(S): 5000 INJECTION INTRAVENOUS; SUBCUTANEOUS at 21:08

## 2020-08-26 RX ADMIN — OXYCODONE AND ACETAMINOPHEN 1 TABLET(S): 5; 325 TABLET ORAL at 18:12

## 2020-08-26 RX ADMIN — Medication 1000 MILLIGRAM(S): at 13:30

## 2020-08-26 RX ADMIN — OXYCODONE AND ACETAMINOPHEN 1 TABLET(S): 5; 325 TABLET ORAL at 18:50

## 2020-08-26 RX ADMIN — FENTANYL CITRATE 25 MICROGRAM(S): 50 INJECTION INTRAVENOUS at 17:15

## 2020-08-26 RX ADMIN — HYDROMORPHONE HYDROCHLORIDE 0.5 MILLIGRAM(S): 2 INJECTION INTRAMUSCULAR; INTRAVENOUS; SUBCUTANEOUS at 09:05

## 2020-08-26 RX ADMIN — HYDROMORPHONE HYDROCHLORIDE 0.5 MILLIGRAM(S): 2 INJECTION INTRAMUSCULAR; INTRAVENOUS; SUBCUTANEOUS at 07:54

## 2020-08-26 RX ADMIN — Medication 400 MILLIGRAM(S): at 13:10

## 2020-08-26 RX ADMIN — PANTOPRAZOLE SODIUM 40 MILLIGRAM(S): 20 TABLET, DELAYED RELEASE ORAL at 05:56

## 2020-08-26 RX ADMIN — CHLORHEXIDINE GLUCONATE 5 MILLILITER(S): 213 SOLUTION TOPICAL at 09:36

## 2020-08-26 RX ADMIN — OXYCODONE AND ACETAMINOPHEN 1 TABLET(S): 5; 325 TABLET ORAL at 23:50

## 2020-08-26 RX ADMIN — HEPARIN SODIUM 5000 UNIT(S): 5000 INJECTION INTRAVENOUS; SUBCUTANEOUS at 05:56

## 2020-08-26 RX ADMIN — OXYCODONE AND ACETAMINOPHEN 1 TABLET(S): 5; 325 TABLET ORAL at 23:30

## 2020-08-26 RX ADMIN — Medication 5 MILLIGRAM(S): at 22:26

## 2020-08-26 RX ADMIN — PROPOFOL 23.5 MICROGRAM(S)/KG/MIN: 10 INJECTION, EMULSION INTRAVENOUS at 05:58

## 2020-08-26 RX ADMIN — SODIUM CHLORIDE 1000 MILLILITER(S): 9 INJECTION, SOLUTION INTRAVENOUS at 15:04

## 2020-08-26 RX ADMIN — CHLORHEXIDINE GLUCONATE 15 MILLILITER(S): 213 SOLUTION TOPICAL at 17:05

## 2020-08-26 RX ADMIN — HEPARIN SODIUM 5000 UNIT(S): 5000 INJECTION INTRAVENOUS; SUBCUTANEOUS at 13:04

## 2020-08-26 RX ADMIN — Medication 30 MILLIGRAM(S): at 18:30

## 2020-08-26 RX ADMIN — CHLORHEXIDINE GLUCONATE 5 MILLILITER(S): 213 SOLUTION TOPICAL at 05:55

## 2020-08-26 RX ADMIN — POTASSIUM PHOSPHATE, MONOBASIC POTASSIUM PHOSPHATE, DIBASIC 63.75 MILLIMOLE(S): 236; 224 INJECTION, SOLUTION INTRAVENOUS at 23:49

## 2020-08-26 RX ADMIN — FENTANYL CITRATE 25 MICROGRAM(S): 50 INJECTION INTRAVENOUS at 17:05

## 2020-08-26 RX ADMIN — Medication 5 MILLIGRAM(S): at 19:58

## 2020-08-26 RX ADMIN — Medication 125 MILLILITER(S): at 02:00

## 2020-08-26 RX ADMIN — DEXMEDETOMIDINE HYDROCHLORIDE IN 0.9% SODIUM CHLORIDE 10.9 MICROGRAM(S)/KG/HR: 4 INJECTION INTRAVENOUS at 11:46

## 2020-08-26 RX ADMIN — CHLORHEXIDINE GLUCONATE 5 MILLILITER(S): 213 SOLUTION TOPICAL at 01:31

## 2020-08-26 RX ADMIN — Medication 5 MILLIGRAM(S): at 18:05

## 2020-08-26 RX ADMIN — Medication 100 MILLIGRAM(S): at 22:26

## 2020-08-26 RX ADMIN — Medication 20 MILLIGRAM(S): at 10:16

## 2020-08-26 RX ADMIN — CHLORHEXIDINE GLUCONATE 15 MILLILITER(S): 213 SOLUTION TOPICAL at 05:55

## 2020-08-26 RX ADMIN — PROPOFOL 23.5 MICROGRAM(S)/KG/MIN: 10 INJECTION, EMULSION INTRAVENOUS at 02:59

## 2020-08-26 RX ADMIN — Medication 30 MILLIGRAM(S): at 20:00

## 2020-08-26 RX ADMIN — Medication 50 GRAM(S): at 02:00

## 2020-08-26 RX ADMIN — DEXMEDETOMIDINE HYDROCHLORIDE IN 0.9% SODIUM CHLORIDE 10.9 MICROGRAM(S)/KG/HR: 4 INJECTION INTRAVENOUS at 05:57

## 2020-08-26 RX ADMIN — HYDROMORPHONE HYDROCHLORIDE 0.5 MILLIGRAM(S): 2 INJECTION INTRAMUSCULAR; INTRAVENOUS; SUBCUTANEOUS at 09:56

## 2020-08-26 NOTE — PROGRESS NOTE ADULT - SUBJECTIVE AND OBJECTIVE BOX
CTICU  CRITICAL  CARE  attending     Hand off received 					   Pertinent clinical, laboratory, radiographic, hemodynamic, echocardiographic, respiratory data, microbiologic data and chart were reviewed and analyzed frequently throughout the course of the day and night      This 44 year-old male, current smoker (30 pack year) with PMH significant for PUD and recent findings of mediastinal thyroid mass presented to this admission for surgical intervention.  Patient initially presented to the ED with complain of shortness of breath and several week history of dry cough.  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  A CT chest with IV contrast (8/1/20) has shown 5.6x5.7x6.8cm large solid and cystic nodule extending from the inferior right lobe of the thyroid gland into the right superior mediastinum.  Negative for thoracic inlet, axillary, mediastinal or hilar adenopathy.  Patient denies fatigues, malaise, weight changes, syncopes, headache, palpitation, shortness of breath, nausea, vomiting, or diarrhea.      FAMILY HISTORY:  No pertinent family history in first degree relatives  PAST MEDICAL & SURGICAL HISTORY:  Tracheal deviation  PUD (peptic ulcer disease)  Status post labral repair of shoulder: (R) Shoulder      14 system review was unremarkable      Vital signs, hemodynamic and respiratory parameters were reviewed from the bedside nursing flow sheet.  ICU Vital Signs Last 24 Hrs  T(C): 36.7 (26 Aug 2020 21:48), Max: 36.7 (26 Aug 2020 21:48)  T(F): 98 (26 Aug 2020 21:48), Max: 98 (26 Aug 2020 21:48)  HR: 84 (26 Aug 2020 23:00) (74 - 101)  BP: --  BP(mean): --  ABP: 163/73 (26 Aug 2020 23:00) (109/49 - 174/87)  ABP(mean): 103 (26 Aug 2020 23:00) (67 - 117)  RR: 18 (26 Aug 2020 23:00) (12 - 20)  SpO2: 100% (26 Aug 2020 23:00) (100% - 100%)    Adult Advanced Hemodynamics Last 24 Hrs  CVP(mm Hg): --  CVP(cm H2O): --  CO: --  CI: --  PA: --  PA(mean): --  PCWP: --  SVR: --  SVRI: --  PVR: --  PVRI: --, ABG - ( 26 Aug 2020 18:53 )  pH, Arterial: 7.50  pH, Blood: x     /  pCO2: 29    /  pO2: 74    / HCO3: 22    / Base Excess: 0.1   /  SaO2: 96                Mode: AC/ CMV (Assist Control/ Continuous Mandatory Ventilation)  RR (machine): 12  TV (machine): 600  FiO2: 40  PEEP: 5  ITime: 1.2  MAP: 7  PIP: 13    Intake and output was reviewed and the fluid balance was calculated  Daily     Daily   I&O's Summary    25 Aug 2020 07:01  -  26 Aug 2020 07:00  --------------------------------------------------------  IN: 773.4 mL / OUT: 1170 mL / NET: -396.6 mL    26 Aug 2020 07:01  -  26 Aug 2020 23:11  --------------------------------------------------------  IN: 1269.6 mL / OUT: 2325 mL / NET: -1055.4 mL        All lines and drain sites were assessed      Neuro: No change in the mental status from the baseline. Follows commands.  Moves all 4 extremities.  Neck: No JVD.  CVS: S1, S2, No S3.  Lungs: Good air entry bilaterally.  Abd: Soft. No tenderness. + Bowel sounds.  Vascular: + DP/PT.  Extremities: No edema.  Lymphatic: Normal.  Skin: No abnormalities.      labs  CBC Full  -  ( 26 Aug 2020 21:44 )  WBC Count : 9.51 K/uL  RBC Count : 3.95 M/uL  Hemoglobin : 11.7 g/dL  Hematocrit : 34.4 %  Platelet Count - Automated : 200 K/uL  Mean Cell Volume : 87.1 fl  Mean Cell Hemoglobin : 29.6 pg  Mean Cell Hemoglobin Concentration : 34.0 gm/dL  Auto Neutrophil # : x  Auto Lymphocyte # : x  Auto Monocyte # : x  Auto Eosinophil # : x  Auto Basophil # : x  Auto Neutrophil % : x  Auto Lymphocyte % : x  Auto Monocyte % : x  Auto Eosinophil % : x  Auto Basophil % : x    08-26    136  |  102  |  12  ----------------------------<  104<H>  3.5   |  25  |  0.90    Ca    8.5      26 Aug 2020 21:44  Phos  2.2     08-26  Mg     2.0     08-26    TPro  6.0  /  Alb  3.7  /  TBili  0.6  /  DBili  x   /  AST  24  /  ALT  11  /  AlkPhos  55  08-26    PT/INR - ( 26 Aug 2020 12:41 )   PT: 15.5 sec;   INR: 1.31          PTT - ( 26 Aug 2020 12:41 )  PTT:29.3 sec  The current medications were reviewed   MEDICATIONS  (STANDING):  ceFAZolin   IVPB 2000 milliGRAM(s) IV Intermittent every 8 hours  chlorhexidine 0.12% Liquid 5 milliLiter(s) Oral Mucosa every 4 hours  chlorhexidine 0.12% Liquid 15 milliLiter(s) Oral Mucosa every 12 hours  heparin   Injectable 5000 Unit(s) SubCutaneous every 8 hours  metoprolol tartrate Injectable 5 milliGRAM(s) IV Push every 6 hours  pantoprazole  Injectable 40 milliGRAM(s) IV Push every 24 hours  potassium phosphate IVPB 15 milliMole(s) IV Intermittent once  sodium chloride 0.9%. 1000 milliLiter(s) (10 mL/Hr) IV Continuous <Continuous>    MEDICATIONS  (PRN):  acetaminophen   Tablet .. 650 milliGRAM(s) Oral every 6 hours PRN Mild Pain (1 - 3)  ketorolac   Injectable 30 milliGRAM(s) IV Push every 6 hours PRN Moderate Pain (4 - 6)  oxycodone    5 mG/acetaminophen 325 mG 1 Tablet(s) Oral every 6 hours PRN Severe Pain (7 - 10)      Patient is a 44y old  Male who presents with mediastinal mass   S/P thyroidectomy.  Hypophosphatemia.  Hemodynamically stable.  Good oxygenation.  Fair urine out put.  Overall doing well.      My plan includes :  Statin and Betablocker.  Close hemodynamic, ventilatory and drain monitoring and management  Monitor for arrhythmias and monitor parameters for organ perfusion  Monitor neurologic status  Monitor renal function.  Head of the bed should remain elevated to 45 deg .   Chest PT and IS will be encouraged  Monitor adequacy of oxygenation and ventilation and attempt to wean oxygen  Nutritional goals will be met using po eventually , ensure adequate caloric intake and monitor the same  Stress ulcer and VTE prophylaxis will be achieved    Glycemic control is satisfactory  Electrolytes have been repleted as necessary and wound care has been carried out. Pain control has been achieved.   Aggressive physical therapy and early mobility and ambulation goals will be met   The family was updated about the course and plan  CRITICAL CARE TIME SPENT in evaluation and management, reassessments, review and interpretation of labs and x-rays, ventilator and hemodynamic management, formulating a plan and coordinating care: ___30____ MIN.  Time does not include procedural time.  CTICU ATTENDING     					    Kwesi Lind MD

## 2020-08-26 NOTE — PROGRESS NOTE ADULT - SUBJECTIVE AND OBJECTIVE BOX
CTICU  CRITICAL  CARE  attending     Hand off received 					   Pertinent clinical, laboratory, radiographic, hemodynamic, echocardiographic, respiratory data, microbiologic data and chart were reviewed and analyzed frequently throughout the course of the day and night  Patient seen and examined with CTS/ SH attending at bedside    Pt is a 44y , Male, post op day # 1 s/p danilo sternotomy; VATS and resection of thyroid mass    today:    glidescope exam of upper airway to r/out edema  weaned and extubated      , FAMILY HISTORY:  No pertinent family history in first degree relatives  PAST MEDICAL & SURGICAL HISTORY:  Tracheal deviation  PUD (peptic ulcer disease)  Status post labral repair of shoulder: (R) Shoulder    Patient is a 44y old  Male who presents with a chief complaint of mediastinal mass (25 Aug 2020 23:55)      14 system review was unremarkable    Vital signs, hemodynamic and respiratory parameters were reviewed from the bedside nursing flowsheet.  ICU Vital Signs Last 24 Hrs  T(C): 36.1 (26 Aug 2020 15:08), Max: 36.1 (26 Aug 2020 01:01)  T(F): 97 (26 Aug 2020 15:08), Max: 97 (26 Aug 2020 05:01)  HR: 74 (26 Aug 2020 15:00) (74 - 101)  BP: --  BP(mean): --  ABP: 138/64 (26 Aug 2020 15:00) (109/49 - 164/76)  ABP(mean): 88 (26 Aug 2020 15:00) (67 - 109)  RR: 16 (26 Aug 2020 15:00) (12 - 20)  SpO2: 100% (26 Aug 2020 15:00) (100% - 100%)    Adult Advanced Hemodynamics Last 24 Hrs  CVP(mm Hg): --  CVP(cm H2O): --  CO: --  CI: --  PA: --  PA(mean): --  PCWP: --  SVR: --  SVRI: --  PVR: --  PVRI: --, ABG - ( 26 Aug 2020 12:35 )  pH, Arterial: 7.43  pH, Blood: x     /  pCO2: 42    /  pO2: 124   / HCO3: 27    / Base Excess: -2.5  /  SaO2: 99                Mode: AC/ CMV (Assist Control/ Continuous Mandatory Ventilation)  RR (machine): 12  TV (machine): 600  FiO2: 40  PEEP: 5  ITime: 1.2  MAP: 7  PIP: 13    Intake and output was reviewed and the fluid balance was calculated  Daily     Daily   I&O's Summary    25 Aug 2020 07:01  -  26 Aug 2020 07:00  --------------------------------------------------------  IN: 773.4 mL / OUT: 1170 mL / NET: -396.6 mL    26 Aug 2020 07:01  -  26 Aug 2020 15:29  --------------------------------------------------------  IN: 1139.6 mL / OUT: 1265 mL / NET: -125.4 mL        All lines and drain sites were assessed  Glycemic trend was reviewedCAPILLARY BLOOD GLUCOSE      POCT Blood Glucose.: 153 mg/dL (25 Aug 2020 20:16)    No acute change in mental status  Auscultation of the chest reveals equal bs  Abdomen is soft  Extremities are warm and well perfused  Wounds appear clean and unremarkable  Antibiotics are periop    labs  CBC Full  -  ( 26 Aug 2020 12:41 )  WBC Count : 10.12 K/uL  RBC Count : 3.94 M/uL  Hemoglobin : 11.6 g/dL  Hematocrit : 34.6 %  Platelet Count - Automated : 228 K/uL  Mean Cell Volume : 87.8 fl  Mean Cell Hemoglobin : 29.4 pg  Mean Cell Hemoglobin Concentration : 33.5 gm/dL  Auto Neutrophil # : x  Auto Lymphocyte # : x  Auto Monocyte # : x  Auto Eosinophil # : x  Auto Basophil # : x  Auto Neutrophil % : x  Auto Lymphocyte % : x  Auto Monocyte % : x  Auto Eosinophil % : x  Auto Basophil % : x    08-26    139  |  103  |  14  ----------------------------<  119<H>  4.0   |  26  |  1.04    Ca    8.6      26 Aug 2020 13:28  Phos  4.4     08-26  Mg     2.1     08-26    TPro  6.0  /  Alb  3.7  /  TBili  0.6  /  DBili  x   /  AST  24  /  ALT  11  /  AlkPhos  55  08-26    PT/INR - ( 26 Aug 2020 12:41 )   PT: 15.5 sec;   INR: 1.31          PTT - ( 26 Aug 2020 12:41 )  PTT:29.3 sec  The current medications were reviewed   MEDICATIONS  (STANDING):  ceFAZolin   IVPB 2000 milliGRAM(s) IV Intermittent every 8 hours  chlorhexidine 0.12% Liquid 5 milliLiter(s) Oral Mucosa every 4 hours  chlorhexidine 0.12% Liquid 15 milliLiter(s) Oral Mucosa every 12 hours  dexMEDEtomidine Infusion 0.5 MICROgram(s)/kG/Hr (10.9 mL/Hr) IV Continuous <Continuous>  heparin   Injectable 5000 Unit(s) SubCutaneous every 8 hours  pantoprazole  Injectable 40 milliGRAM(s) IV Push every 24 hours  propofol Infusion 45 MICROgram(s)/kG/Min (23.5 mL/Hr) IV Continuous <Continuous>  sodium chloride 0.9%. 1000 milliLiter(s) (10 mL/Hr) IV Continuous <Continuous>    MEDICATIONS  (PRN):       PROBLEM LIST/ ASSESSMENT:  HEALTH ISSUES - PROBLEM Dx:      ,   Patient is a 44y old  Male who presents with a chief complaint of mediastinal mass (25 Aug 2020 23:55)     s/p danilo sternotomy; VATS and resection of thyroid mass      My plan includes :  close hemodynamic, ventilatory and drain monitoring and management per post op routine    Monitor for arrhythmias and monitor parameters for organ perfusion  monitor neurologic status  Head of the bed should remain elevated to 45 deg .   chest PT and IS will be encouraged  monitor adequacy of oxygenation and ventilation and attempt to wean oxygen  Nutritional goals will be met using po eventually , ensure adequate caloric intake and montior the same  Stress ulcer and VTE prophylaxis will be achieved    Glycemic control is satisfactory  Electrolytes have been repleted as necessary and wound care has been carried out. Pain control has been achieved.   agressive physical therapy and early mobility and ambulation goals will be met   The family was updated about the course and plan  CRITICAL CARE TIME SPENT in evaluation and management, reassessments, review and interpretation of labs and x-rays, ventilator and hemodynamic management, formulating a plan and coordinating care: ___55___ MIN.  Time does not include procedural time.  CTICU ATTENDING     					    Simone Stark MD

## 2020-08-27 LAB
A1C WITH ESTIMATED AVERAGE GLUCOSE RESULT: 5.7 % — HIGH (ref 4–5.6)
ALBUMIN SERPL ELPH-MCNC: 3.5 G/DL — SIGNIFICANT CHANGE UP (ref 3.3–5)
ALBUMIN SERPL ELPH-MCNC: 3.8 G/DL — SIGNIFICANT CHANGE UP (ref 3.3–5)
ALP SERPL-CCNC: 57 U/L — SIGNIFICANT CHANGE UP (ref 40–120)
ALP SERPL-CCNC: 63 U/L — SIGNIFICANT CHANGE UP (ref 40–120)
ALT FLD-CCNC: 10 U/L — SIGNIFICANT CHANGE UP (ref 10–45)
ALT FLD-CCNC: 11 U/L — SIGNIFICANT CHANGE UP (ref 10–45)
ANION GAP SERPL CALC-SCNC: 12 MMOL/L — SIGNIFICANT CHANGE UP (ref 5–17)
ANION GAP SERPL CALC-SCNC: 9 MMOL/L — SIGNIFICANT CHANGE UP (ref 5–17)
APTT BLD: 31.3 SEC — SIGNIFICANT CHANGE UP (ref 27.5–35.5)
APTT BLD: 33 SEC — SIGNIFICANT CHANGE UP (ref 27.5–35.5)
AST SERPL-CCNC: 21 U/L — SIGNIFICANT CHANGE UP (ref 10–40)
AST SERPL-CCNC: 21 U/L — SIGNIFICANT CHANGE UP (ref 10–40)
BILIRUB SERPL-MCNC: 0.4 MG/DL — SIGNIFICANT CHANGE UP (ref 0.2–1.2)
BILIRUB SERPL-MCNC: 0.4 MG/DL — SIGNIFICANT CHANGE UP (ref 0.2–1.2)
BUN SERPL-MCNC: 11 MG/DL — SIGNIFICANT CHANGE UP (ref 7–23)
BUN SERPL-MCNC: 11 MG/DL — SIGNIFICANT CHANGE UP (ref 7–23)
CALCIUM SERPL-MCNC: 8.5 MG/DL — SIGNIFICANT CHANGE UP (ref 8.4–10.5)
CALCIUM SERPL-MCNC: 8.8 MG/DL — SIGNIFICANT CHANGE UP (ref 8.4–10.5)
CHLORIDE SERPL-SCNC: 101 MMOL/L — SIGNIFICANT CHANGE UP (ref 96–108)
CHLORIDE SERPL-SCNC: 99 MMOL/L — SIGNIFICANT CHANGE UP (ref 96–108)
CO2 SERPL-SCNC: 23 MMOL/L — SIGNIFICANT CHANGE UP (ref 22–31)
CO2 SERPL-SCNC: 26 MMOL/L — SIGNIFICANT CHANGE UP (ref 22–31)
CREAT SERPL-MCNC: 0.79 MG/DL — SIGNIFICANT CHANGE UP (ref 0.5–1.3)
CREAT SERPL-MCNC: 0.88 MG/DL — SIGNIFICANT CHANGE UP (ref 0.5–1.3)
ESTIMATED AVERAGE GLUCOSE: 117 MG/DL — HIGH (ref 68–114)
GAS PNL BLDA: SIGNIFICANT CHANGE UP
GAS PNL BLDA: SIGNIFICANT CHANGE UP
GLUCOSE SERPL-MCNC: 101 MG/DL — HIGH (ref 70–99)
GLUCOSE SERPL-MCNC: 116 MG/DL — HIGH (ref 70–99)
HCT VFR BLD CALC: 34.3 % — LOW (ref 39–50)
HCT VFR BLD CALC: 36.9 % — LOW (ref 39–50)
HGB BLD-MCNC: 11.6 G/DL — LOW (ref 13–17)
HGB BLD-MCNC: 12.4 G/DL — LOW (ref 13–17)
INR BLD: 1.22 — HIGH (ref 0.88–1.16)
INR BLD: 1.3 — HIGH (ref 0.88–1.16)
MAGNESIUM SERPL-MCNC: 1.9 MG/DL — SIGNIFICANT CHANGE UP (ref 1.6–2.6)
MAGNESIUM SERPL-MCNC: 2 MG/DL — SIGNIFICANT CHANGE UP (ref 1.6–2.6)
MCHC RBC-ENTMCNC: 30 PG — SIGNIFICANT CHANGE UP (ref 27–34)
MCHC RBC-ENTMCNC: 30.2 PG — SIGNIFICANT CHANGE UP (ref 27–34)
MCHC RBC-ENTMCNC: 33.6 GM/DL — SIGNIFICANT CHANGE UP (ref 32–36)
MCHC RBC-ENTMCNC: 33.8 GM/DL — SIGNIFICANT CHANGE UP (ref 32–36)
MCV RBC AUTO: 89.1 FL — SIGNIFICANT CHANGE UP (ref 80–100)
MCV RBC AUTO: 89.3 FL — SIGNIFICANT CHANGE UP (ref 80–100)
NRBC # BLD: 0 /100 WBCS — SIGNIFICANT CHANGE UP (ref 0–0)
NRBC # BLD: 0 /100 WBCS — SIGNIFICANT CHANGE UP (ref 0–0)
PHOSPHATE SERPL-MCNC: 2.6 MG/DL — SIGNIFICANT CHANGE UP (ref 2.5–4.5)
PHOSPHATE SERPL-MCNC: 3.4 MG/DL — SIGNIFICANT CHANGE UP (ref 2.5–4.5)
PLATELET # BLD AUTO: 199 K/UL — SIGNIFICANT CHANGE UP (ref 150–400)
PLATELET # BLD AUTO: 213 K/UL — SIGNIFICANT CHANGE UP (ref 150–400)
POTASSIUM SERPL-MCNC: 3.8 MMOL/L — SIGNIFICANT CHANGE UP (ref 3.5–5.3)
POTASSIUM SERPL-MCNC: 3.8 MMOL/L — SIGNIFICANT CHANGE UP (ref 3.5–5.3)
POTASSIUM SERPL-SCNC: 3.8 MMOL/L — SIGNIFICANT CHANGE UP (ref 3.5–5.3)
POTASSIUM SERPL-SCNC: 3.8 MMOL/L — SIGNIFICANT CHANGE UP (ref 3.5–5.3)
PROT SERPL-MCNC: 5.9 G/DL — LOW (ref 6–8.3)
PROT SERPL-MCNC: 6.9 G/DL — SIGNIFICANT CHANGE UP (ref 6–8.3)
PROTHROM AB SERPL-ACNC: 14.5 SEC — HIGH (ref 10.6–13.6)
PROTHROM AB SERPL-ACNC: 15.4 SEC — HIGH (ref 10.6–13.6)
RBC # BLD: 3.84 M/UL — LOW (ref 4.2–5.8)
RBC # BLD: 4.14 M/UL — LOW (ref 4.2–5.8)
RBC # FLD: 14.2 % — SIGNIFICANT CHANGE UP (ref 10.3–14.5)
RBC # FLD: 14.2 % — SIGNIFICANT CHANGE UP (ref 10.3–14.5)
SODIUM SERPL-SCNC: 134 MMOL/L — LOW (ref 135–145)
SODIUM SERPL-SCNC: 136 MMOL/L — SIGNIFICANT CHANGE UP (ref 135–145)
WBC # BLD: 9.21 K/UL — SIGNIFICANT CHANGE UP (ref 3.8–10.5)
WBC # BLD: 9.92 K/UL — SIGNIFICANT CHANGE UP (ref 3.8–10.5)
WBC # FLD AUTO: 9.21 K/UL — SIGNIFICANT CHANGE UP (ref 3.8–10.5)
WBC # FLD AUTO: 9.92 K/UL — SIGNIFICANT CHANGE UP (ref 3.8–10.5)

## 2020-08-27 PROCEDURE — 71045 X-RAY EXAM CHEST 1 VIEW: CPT | Mod: 26,76

## 2020-08-27 RX ORDER — POLYETHYLENE GLYCOL 3350 17 G/17G
17 POWDER, FOR SOLUTION ORAL DAILY
Refills: 0 | Status: DISCONTINUED | OUTPATIENT
Start: 2020-08-27 | End: 2020-08-29

## 2020-08-27 RX ORDER — SODIUM CHLORIDE 9 MG/ML
3 INJECTION INTRAMUSCULAR; INTRAVENOUS; SUBCUTANEOUS EVERY 8 HOURS
Refills: 0 | Status: DISCONTINUED | OUTPATIENT
Start: 2020-08-27 | End: 2020-08-29

## 2020-08-27 RX ORDER — PANTOPRAZOLE SODIUM 20 MG/1
40 TABLET, DELAYED RELEASE ORAL
Refills: 0 | Status: DISCONTINUED | OUTPATIENT
Start: 2020-08-27 | End: 2020-08-29

## 2020-08-27 RX ORDER — METOPROLOL TARTRATE 50 MG
12.5 TABLET ORAL EVERY 6 HOURS
Refills: 0 | Status: DISCONTINUED | OUTPATIENT
Start: 2020-08-27 | End: 2020-08-29

## 2020-08-27 RX ORDER — POTASSIUM CHLORIDE 20 MEQ
20 PACKET (EA) ORAL ONCE
Refills: 0 | Status: COMPLETED | OUTPATIENT
Start: 2020-08-27 | End: 2020-08-27

## 2020-08-27 RX ORDER — SENNA PLUS 8.6 MG/1
2 TABLET ORAL AT BEDTIME
Refills: 0 | Status: DISCONTINUED | OUTPATIENT
Start: 2020-08-27 | End: 2020-08-29

## 2020-08-27 RX ADMIN — OXYCODONE AND ACETAMINOPHEN 1 TABLET(S): 5; 325 TABLET ORAL at 23:08

## 2020-08-27 RX ADMIN — Medication 12.5 MILLIGRAM(S): at 23:40

## 2020-08-27 RX ADMIN — Medication 5 MILLIGRAM(S): at 05:37

## 2020-08-27 RX ADMIN — OXYCODONE AND ACETAMINOPHEN 1 TABLET(S): 5; 325 TABLET ORAL at 11:00

## 2020-08-27 RX ADMIN — HEPARIN SODIUM 5000 UNIT(S): 5000 INJECTION INTRAVENOUS; SUBCUTANEOUS at 21:04

## 2020-08-27 RX ADMIN — SODIUM CHLORIDE 3 MILLILITER(S): 9 INJECTION INTRAMUSCULAR; INTRAVENOUS; SUBCUTANEOUS at 21:04

## 2020-08-27 RX ADMIN — Medication 650 MILLIGRAM(S): at 21:15

## 2020-08-27 RX ADMIN — Medication 30 MILLIGRAM(S): at 00:53

## 2020-08-27 RX ADMIN — PANTOPRAZOLE SODIUM 40 MILLIGRAM(S): 20 TABLET, DELAYED RELEASE ORAL at 05:37

## 2020-08-27 RX ADMIN — Medication 100 MILLIGRAM(S): at 07:36

## 2020-08-27 RX ADMIN — Medication 650 MILLIGRAM(S): at 20:47

## 2020-08-27 RX ADMIN — SODIUM CHLORIDE 3 MILLILITER(S): 9 INJECTION INTRAMUSCULAR; INTRAVENOUS; SUBCUTANEOUS at 14:06

## 2020-08-27 RX ADMIN — Medication 30 MILLIGRAM(S): at 01:00

## 2020-08-27 RX ADMIN — Medication 12.5 MILLIGRAM(S): at 17:38

## 2020-08-27 RX ADMIN — Medication 20 MILLIEQUIVALENT(S): at 12:21

## 2020-08-27 RX ADMIN — OXYCODONE AND ACETAMINOPHEN 1 TABLET(S): 5; 325 TABLET ORAL at 17:00

## 2020-08-27 RX ADMIN — SENNA PLUS 2 TABLET(S): 8.6 TABLET ORAL at 21:04

## 2020-08-27 RX ADMIN — Medication 12.5 MILLIGRAM(S): at 12:21

## 2020-08-27 RX ADMIN — Medication 100 MILLIGRAM(S): at 14:04

## 2020-08-27 RX ADMIN — HEPARIN SODIUM 5000 UNIT(S): 5000 INJECTION INTRAVENOUS; SUBCUTANEOUS at 05:37

## 2020-08-27 RX ADMIN — HEPARIN SODIUM 5000 UNIT(S): 5000 INJECTION INTRAVENOUS; SUBCUTANEOUS at 13:22

## 2020-08-27 RX ADMIN — POLYETHYLENE GLYCOL 3350 17 GRAM(S): 17 POWDER, FOR SOLUTION ORAL at 12:21

## 2020-08-27 RX ADMIN — OXYCODONE AND ACETAMINOPHEN 1 TABLET(S): 5; 325 TABLET ORAL at 16:03

## 2020-08-27 RX ADMIN — PANTOPRAZOLE SODIUM 40 MILLIGRAM(S): 20 TABLET, DELAYED RELEASE ORAL at 14:04

## 2020-08-27 RX ADMIN — OXYCODONE AND ACETAMINOPHEN 1 TABLET(S): 5; 325 TABLET ORAL at 10:07

## 2020-08-27 NOTE — SWALLOW BEDSIDE ASSESSMENT ADULT - CONSISTENCIES ADMINISTERED
thin liquid/puree/~6oz thins + 5 tsp puree + packet of elidia crackers (2 large crackers)/solid
solid/thin liquid/puree/mech soft

## 2020-08-27 NOTE — SWALLOW BEDSIDE ASSESSMENT ADULT - SWALLOW EVAL: DIAGNOSIS
Oropharyngeal swallow was clinically judged to be WFL. Cont. recommendations for a regular diet with thin liquids. No further intervention is warranted at this time.
Oropharyngeal swallow was clinically judged to be WFL s/p thyroidectomy. Recommend a regular solid diet with thin liquids.

## 2020-08-27 NOTE — SWALLOW BEDSIDE ASSESSMENT ADULT - SLP GENERAL OBSERVATIONS
Received awake, sitting upright in chair, A&OX4.
Received awake in bed, A&Ox4, mildly dysphonic s/p recent extubation.

## 2020-08-27 NOTE — SWALLOW BEDSIDE ASSESSMENT ADULT - NS SPL SWALLOW CLINIC TRIAL FT
Mastication with solids was timely and efficient. Laryngeal movement palpated. Swallow trigger appeared brisk and timely. No clinical overt s/s of aspiration appreciated.
Mastication with solids was timely and efficient. Laryngeal movement appreciated upon palpation. Swallow trigger appeared brisk and timely. No clinical overt s/s of aspiration appreciated.

## 2020-08-27 NOTE — PROGRESS NOTE ADULT - SUBJECTIVE AND OBJECTIVE BOX
9E to 9L transfer/acceptance note:       Operation / Date: 8/25/2020 R VATS / brian sternotomy removal of thyroid mass     SUBJECTIVE ASSESSMENT:  Patient seen this afternoon, doing well and not offering any complaints at this time. Denies any chest pain or SOB. Has been ambulating and agreeable to walk at least 5 times today. Has not had a bowel movement since surgery but is passing flatus and tolerating PO without any nausea or vomiting.     Vital Signs Last 24 Hrs  T(C): 36.5 (27 Aug 2020 11:39), Max: 36.8 (27 Aug 2020 09:00)  T(F): 97.7 (27 Aug 2020 11:39), Max: 98.2 (27 Aug 2020 09:00)  HR: 86 (27 Aug 2020 12:20) (74 - 89)  BP: 140/90 (27 Aug 2020 12:20) (140/90 - 151/91)  BP(mean): 110 (27 Aug 2020 12:20) (110 - 116)  RR: 18 (27 Aug 2020 12:20) (14 - 20)  SpO2: 100% (27 Aug 2020 12:20) (98% - 100%)  I&O's Detail    26 Aug 2020 07:01  -  27 Aug 2020 07:00  --------------------------------------------------------  IN:    dexmedetomidine Infusion: 50 mL    IV PiggyBack: 50 mL    Lactated Ringers IV Bolus: 1000 mL    propofol Infusion: 9.6 mL    sodium chloride 0.9%: 240 mL    Solution: 250 mL  Total IN: 1599.6 mL    OUT:    Bulb: 55 mL    Chest Tube: 145 mL    Indwelling Catheter - Urethral: 3125 mL  Total OUT: 3325 mL    Total NET: -1725.4 mL      27 Aug 2020 07:01  -  27 Aug 2020 12:36  --------------------------------------------------------  IN:    Oral Fluid: 150 mL    sodium chloride 0.9%: 10 mL  Total IN: 160 mL    OUT:    Bulb: 20 mL    Chest Tube: 25 mL    Voided: 250 mL  Total OUT: 295 mL    Total NET: -135 mL      CHEST TUBE: No  MARÍA ELENA DRAIN:  No  EPICARDIAL WIRES: No  TIE DOWNS: Yes   JANSEN: No    PHYSICAL EXAM:    General: Patient sitting comfortably in a chair, no acute distress     Neurological: Alert and oriented. No focal neurological deficits     Cardiovascular: S1S2, RRR, no murmurs appreciated on exam     Respiratory: Clear to ausculation bilaterally     Gastrointestinal: Abdomen soft, non tender, non distended     Extremities: Warm and well perfused. No edema or calf tenderness     Vascular: Peripheral pulses 2+ bilaterally     Incision Sites: Brian sternotomy incision covered with OR mepilex dressing to be removed POD#3   R VATS incisions C/D/I   Chest tube site covered with occlusive dressing     LABS:                        12.4   9.92  )-----------( 213      ( 27 Aug 2020 10:18 )             36.9       COUMADIN:  No    PT/INR - ( 27 Aug 2020 10:18 )   PT: 14.5 sec;   INR: 1.22          PTT - ( 27 Aug 2020 10:18 )  PTT:33.0 sec    08-27    134<L>  |  99  |  11  ----------------------------<  116<H>  3.8   |  23  |  0.79    Ca    8.8      27 Aug 2020 10:18  Phos  2.6     08-27  Mg     2.0     08-27    TPro  6.9  /  Alb  3.8  /  TBili  0.4  /  DBili  x   /  AST  21  /  ALT  11  /  AlkPhos  63  08-27          MEDICATIONS  (STANDING):  ceFAZolin   IVPB 2000 milliGRAM(s) IV Intermittent every 8 hours  heparin   Injectable 5000 Unit(s) SubCutaneous every 8 hours  metoprolol tartrate 12.5 milliGRAM(s) Oral every 6 hours  pantoprazole    Tablet 40 milliGRAM(s) Oral before breakfast  polyethylene glycol 3350 17 Gram(s) Oral daily  sodium chloride 0.9% lock flush 3 milliLiter(s) IV Push every 8 hours    MEDICATIONS  (PRN):  acetaminophen   Tablet .. 650 milliGRAM(s) Oral every 6 hours PRN Mild Pain (1 - 3)  oxycodone    5 mG/acetaminophen 325 mG 1 Tablet(s) Oral every 6 hours PRN Severe Pain (7 - 10)        RADIOLOGY & ADDITIONAL TESTS:

## 2020-08-27 NOTE — DIETITIAN INITIAL EVALUATION ADULT. - OTHER INFO
44M current smoker with PMHx of PUD and recent findings of mediastinal thyroid mass evaluated as an outpatient and presented to [facility] for elective procedure on 8/25/2020. Patient underwent thyroid mass removal via danilo-sternotomy and R VATS incisions with Dr. Mckinnon. Procedure was uncomplicated and patient arrived to ICU intubated and stable. He was extubated POD#1 after glidescope exam. He passed S&S evaluation and was advanced to regular diet on POD#2. He remained stable and transferred to  today 8/27. Initially on CLD this AM, later advanced to DASH/TLC diet. At time of assessment noted 75% clear liquid tray consumed, eager to have solid foods. No reported n/v/d/c, chewing/ swallowing issues or pain impacting intake, skin is with MSI, jesus score 18. Noted allergy to shellfish. No recent wt changes. Encouraged intake through day and discussed DASH diet purpose. Will continue to follow per protocol.

## 2020-08-27 NOTE — DIETITIAN INITIAL EVALUATION ADULT. - ENERGY NEEDS
ABW used for calculations as pt between % of IBW.   ABW 87kg, IBW 86kg, 101% IBW, ht 74", BMI 24.7   Nutrient needs based on [facility] standards of care for maintenance in adults, adjusted for post-op needs, age, fluid per team

## 2020-08-27 NOTE — DIETITIAN INITIAL EVALUATION ADULT. - ADD RECOMMEND
1. Encourage intake through day 2. Manage pain 3. Monitor and replete lytes 4. Trend wts 5. Reinforce ed

## 2020-08-27 NOTE — CHART NOTE - NSCHARTNOTEFT_GEN_A_CORE
43 y/o male s/p  thyroidectomy (mobolized through collar incision and right robotic assisted thoracotomy approach), POD#2. Patient with no air leak and minimal drainage. R pleural CT removed without complications. CXR post pull stable

## 2020-08-27 NOTE — SWALLOW BEDSIDE ASSESSMENT ADULT - COMMENTS
Initial evaluation completed yesterday with recommendations for a regular diet with thin liquids. Pt placed on clear liquid diet by team. Now reconsulted.

## 2020-08-28 LAB
ANION GAP SERPL CALC-SCNC: 11 MMOL/L — SIGNIFICANT CHANGE UP (ref 5–17)
BASOPHILS # BLD AUTO: 0.02 K/UL — SIGNIFICANT CHANGE UP (ref 0–0.2)
BASOPHILS NFR BLD AUTO: 0.2 % — SIGNIFICANT CHANGE UP (ref 0–2)
BUN SERPL-MCNC: 10 MG/DL — SIGNIFICANT CHANGE UP (ref 7–23)
CALCIUM SERPL-MCNC: 8.8 MG/DL — SIGNIFICANT CHANGE UP (ref 8.4–10.5)
CHLORIDE SERPL-SCNC: 102 MMOL/L — SIGNIFICANT CHANGE UP (ref 96–108)
CO2 SERPL-SCNC: 24 MMOL/L — SIGNIFICANT CHANGE UP (ref 22–31)
CREAT SERPL-MCNC: 0.87 MG/DL — SIGNIFICANT CHANGE UP (ref 0.5–1.3)
EOSINOPHIL # BLD AUTO: 0.09 K/UL — SIGNIFICANT CHANGE UP (ref 0–0.5)
EOSINOPHIL NFR BLD AUTO: 1 % — SIGNIFICANT CHANGE UP (ref 0–6)
GLUCOSE SERPL-MCNC: 104 MG/DL — HIGH (ref 70–99)
HCT VFR BLD CALC: 34.2 % — LOW (ref 39–50)
HGB BLD-MCNC: 11.7 G/DL — LOW (ref 13–17)
IMM GRANULOCYTES NFR BLD AUTO: 0.4 % — SIGNIFICANT CHANGE UP (ref 0–1.5)
LYMPHOCYTES # BLD AUTO: 1.41 K/UL — SIGNIFICANT CHANGE UP (ref 1–3.3)
LYMPHOCYTES # BLD AUTO: 15.3 % — SIGNIFICANT CHANGE UP (ref 13–44)
MAGNESIUM SERPL-MCNC: 2 MG/DL — SIGNIFICANT CHANGE UP (ref 1.6–2.6)
MCHC RBC-ENTMCNC: 30.2 PG — SIGNIFICANT CHANGE UP (ref 27–34)
MCHC RBC-ENTMCNC: 34.2 GM/DL — SIGNIFICANT CHANGE UP (ref 32–36)
MCV RBC AUTO: 88.1 FL — SIGNIFICANT CHANGE UP (ref 80–100)
MONOCYTES # BLD AUTO: 1.5 K/UL — HIGH (ref 0–0.9)
MONOCYTES NFR BLD AUTO: 16.2 % — HIGH (ref 2–14)
NEUTROPHILS # BLD AUTO: 6.18 K/UL — SIGNIFICANT CHANGE UP (ref 1.8–7.4)
NEUTROPHILS NFR BLD AUTO: 66.9 % — SIGNIFICANT CHANGE UP (ref 43–77)
NRBC # BLD: 0 /100 WBCS — SIGNIFICANT CHANGE UP (ref 0–0)
PLATELET # BLD AUTO: 203 K/UL — SIGNIFICANT CHANGE UP (ref 150–400)
POTASSIUM SERPL-MCNC: 4 MMOL/L — SIGNIFICANT CHANGE UP (ref 3.5–5.3)
POTASSIUM SERPL-SCNC: 4 MMOL/L — SIGNIFICANT CHANGE UP (ref 3.5–5.3)
RBC # BLD: 3.88 M/UL — LOW (ref 4.2–5.8)
RBC # FLD: 13.8 % — SIGNIFICANT CHANGE UP (ref 10.3–14.5)
SODIUM SERPL-SCNC: 137 MMOL/L — SIGNIFICANT CHANGE UP (ref 135–145)
WBC # BLD: 9.24 K/UL — SIGNIFICANT CHANGE UP (ref 3.8–10.5)
WBC # FLD AUTO: 9.24 K/UL — SIGNIFICANT CHANGE UP (ref 3.8–10.5)

## 2020-08-28 PROCEDURE — 71045 X-RAY EXAM CHEST 1 VIEW: CPT | Mod: 26

## 2020-08-28 RX ADMIN — OXYCODONE AND ACETAMINOPHEN 1 TABLET(S): 5; 325 TABLET ORAL at 09:39

## 2020-08-28 RX ADMIN — HEPARIN SODIUM 5000 UNIT(S): 5000 INJECTION INTRAVENOUS; SUBCUTANEOUS at 05:13

## 2020-08-28 RX ADMIN — OXYCODONE AND ACETAMINOPHEN 1 TABLET(S): 5; 325 TABLET ORAL at 21:12

## 2020-08-28 RX ADMIN — SODIUM CHLORIDE 3 MILLILITER(S): 9 INJECTION INTRAMUSCULAR; INTRAVENOUS; SUBCUTANEOUS at 06:20

## 2020-08-28 RX ADMIN — Medication 12.5 MILLIGRAM(S): at 18:48

## 2020-08-28 RX ADMIN — OXYCODONE AND ACETAMINOPHEN 1 TABLET(S): 5; 325 TABLET ORAL at 00:15

## 2020-08-28 RX ADMIN — Medication 12.5 MILLIGRAM(S): at 05:12

## 2020-08-28 RX ADMIN — SENNA PLUS 2 TABLET(S): 8.6 TABLET ORAL at 21:12

## 2020-08-28 RX ADMIN — HEPARIN SODIUM 5000 UNIT(S): 5000 INJECTION INTRAVENOUS; SUBCUTANEOUS at 12:57

## 2020-08-28 RX ADMIN — SODIUM CHLORIDE 3 MILLILITER(S): 9 INJECTION INTRAMUSCULAR; INTRAVENOUS; SUBCUTANEOUS at 12:57

## 2020-08-28 RX ADMIN — HEPARIN SODIUM 5000 UNIT(S): 5000 INJECTION INTRAVENOUS; SUBCUTANEOUS at 21:12

## 2020-08-28 RX ADMIN — SODIUM CHLORIDE 3 MILLILITER(S): 9 INJECTION INTRAMUSCULAR; INTRAVENOUS; SUBCUTANEOUS at 21:12

## 2020-08-28 RX ADMIN — Medication 12.5 MILLIGRAM(S): at 12:57

## 2020-08-28 RX ADMIN — OXYCODONE AND ACETAMINOPHEN 1 TABLET(S): 5; 325 TABLET ORAL at 16:33

## 2020-08-28 RX ADMIN — OXYCODONE AND ACETAMINOPHEN 1 TABLET(S): 5; 325 TABLET ORAL at 15:55

## 2020-08-28 RX ADMIN — OXYCODONE AND ACETAMINOPHEN 1 TABLET(S): 5; 325 TABLET ORAL at 10:49

## 2020-08-28 RX ADMIN — PANTOPRAZOLE SODIUM 40 MILLIGRAM(S): 20 TABLET, DELAYED RELEASE ORAL at 06:20

## 2020-08-28 RX ADMIN — OXYCODONE AND ACETAMINOPHEN 1 TABLET(S): 5; 325 TABLET ORAL at 05:09

## 2020-08-28 RX ADMIN — OXYCODONE AND ACETAMINOPHEN 1 TABLET(S): 5; 325 TABLET ORAL at 06:23

## 2020-08-28 NOTE — PROGRESS NOTE ADULT - ASSESSMENT
Assessment:   This is a 44 year old male, current smoker with PMHx of PUD and recent findings of mediastinal thyroid mass evaluated as an outpatient and presented to [facility] for elective procedure on 8/25/2020. Patient underwent thyroid mass removal via danilo-sternotomy and R VATS incisions with Dr. Mckinnon. Procedure was uncomplicated and patient arrived to ICU intubated and stable. He was extubated POD#1 after glidescope exam. He passed S&S evaluation and was advanced to regular diet on POD#2 and remained stable and transferred to . POD#3 and was pt remains stable with drain in neck incision (managed by ENT). ENT consulted and pending plan from team. Dr. Rey to talk to Dr. Villavicencio regarding possible laryngoscopy before discharge.     Plan:    Neurovascular:   -Pain well controlled with current regimen. PRN's:    Cardiovascular:   -Hemodynamically stable.   -Monitor: BP, HR, tele    Respiratory:   -Oxygenating well on room air  -Encourage continued use of IS 10x/hr and frequent ambulation  -CXR:    GI:  -GI PPX:  -PO Diet  -Bowel Regimen:     Renal / :  -Continue to monitor renal function: BUN/Cr  -Monitor I/O's daily     Endocrine:    -No hx of DM or thyroid dx  -A1c:  -TSH:    Hematologic:  -CBC: H/H-  -Coagulation Panel.    ID:  -Temperature:   -CBC: WBC-  -Continue to observe for SIRS/Sepsis Syndrome.    Prophylaxis:  -DVT prophylaxis with 5000 SubQ Heparin q8h.  -Continue with SCD's b/l while patient is at rest     Disposition:  -Discharge home once patient is medically ready Assessment:   This is a 44 year old male, current smoker with PMHx of PUD and recent findings of mediastinal thyroid mass evaluated as an outpatient and presented to [facility] for elective procedure on 8/25/2020. Patient underwent thyroid mass removal via danilo-sternotomy and R VATS incisions with Dr. Mckinnon. Procedure was uncomplicated and patient arrived to ICU intubated and stable. He was extubated POD#1 after glidescope exam. He passed S&S evaluation and was advanced to regular diet on POD#2 and remained stable and transferred to . POD#3 and was pt remains stable with drain in neck incision (managed by ENT). ENT consulted and pending plan from team regarding drain. Dr. Rey to talk to Dr. Villavicencio regarding possible laryngoscopy before discharge.     Plan:  Neurovascular:   -Pain well controlled with current regimen. PRN's: acetaminophen, percocet     Cardiovascular:   -POD#3 thyroidectomy (through collar incision) and RA thoracotomy approach     -x1 Pierre in place in neck (managed by ENT, following up when to be removed)      -Dr. Rey to follow up with Dr. Villavicencio regarding possible laryngoscopy before d/c, f/u Dr. Rey  -Hemodynamically stable.   -Monitor: BP, HR, tele  -C/w BB 12.5mg Q6 hours     Respiratory: Stable   -Oxygenating well on room air  -Encourage continued use of IS 10x/hr and frequent ambulation  -CXR: no acute pathology, no PTX     GI: Stable   -GI PPX: pantoprazole 40mg daily   -PO Diet: DASH/TLC  -Bowel Regimen: senna, Miralax PRN   +BM this morning     Renal / : Stable   -Continue to monitor renal function: BUN/Cr 10/0.87  -Monitor I/O's daily     Endocrine:  Stable   -No hx of DM or thyroid dx  -A1c: 5.7  -TSH: 0.4     Hematologic: Stable   -CBC: H/H- 11.7/34.2   -Coagulation Panel.    ID: Stable   -Temperature: afebrile  -CBC: WBC- 9.2   -Continue to observe for SIRS/Sepsis Syndrome.    Prophylaxis:  -DVT prophylaxis with 5000 SubQ Heparin q8h.  -Continue with SCD's b/l while patient is at rest     Disposition:  -Discharge home once patient is medically ready Assessment:   This is a 44 year old male, current smoker with PMHx of PUD and recent findings of mediastinal thyroid mass evaluated as an outpatient and presented to [facility] for elective procedure on 8/25/2020. Patient underwent thyroid mass removal via danilo-sternotomy and R VATS incisions with Dr. Mckinnon. Procedure was uncomplicated and patient arrived to ICU intubated and stable. He was extubated POD#1 after glidescope exam. He passed S&S evaluation and was advanced to regular diet on POD#2 and remained stable and transferred to . POD#3 and was pt remains stable with drain in neck incision (managed by ENT). ENT consulted and pending plan from team regarding drain. Dr. Rey to talk to Dr. Villavicencio regarding possible laryngoscopy before discharge.     Plan:  Neurovascular:   -Pain well controlled with current regimen. PRN's: acetaminophen, percocet     Cardiovascular:   -POD#3 thyroidectomy (through collar incision) and RA thoracotomy approach     -x1 Pierre in place in neck (managed by ENT, following up when to be removed)      -Dr. Rey to follow up with Dr. Villavicencio regarding possible laryngoscopy before d/c, f/u Dr. Rey  -Hemodynamically stable.   -Monitor: BP, HR, tele  -C/w BB 12.5mg Q6 hours     Respiratory: Stable   -Oxygenating well on room air  -Encourage continued use of IS 10x/hr and frequent ambulation  -CXR: no acute pathology, no PTX     GI: Stable   -GI PPX: pantoprazole 40mg daily   -PO Diet: DASH/TLC  -Bowel Regimen: senna, Miralax PRN   +BM this morning     Renal / : Stable   -Continue to monitor renal function: BUN/Cr 10/0.87  -- creatinine has remained stable this admission, no ALL   -Monitor I/O's daily     Endocrine:  Stable   -No hx of DM or thyroid dx  -A1c: 5.7  -TSH: 0.4     Hematologic: Stable   -CBC: H/H- 11.7/34.2   -Coagulation Panel.    ID: Stable   -Temperature: afebrile  -CBC: WBC- 9.2   -Continue to observe for SIRS/Sepsis Syndrome.    Prophylaxis:  -DVT prophylaxis with 5000 SubQ Heparin q8h.  -Continue with SCD's b/l while patient is at rest     Disposition:  -Discharge home once patient is medically ready

## 2020-08-28 NOTE — PROGRESS NOTE ADULT - ASSESSMENT
44M s/p hemisternotomy, VATS and hemithyroidectomy on 8/25.    - s/p drain removal  - Stable per ENT  - No Ca/PTH required because hemithyroid, but stable levels  - Pain control  - DASH diet  - Sternotomy management per CTS  - Contact ENT if any issues

## 2020-08-28 NOTE — PROGRESS NOTE ADULT - SUBJECTIVE AND OBJECTIVE BOX
ENT Progress Note    Subjective    44M s/p hemisternotomy, VATS and hemithyroidectomy on 8/25.    Pt seen and examined at bedside. No acute events overnight. Pt is breathing comfortably on room air. Tolerating diet with no nausea/vomiting. Pain well controlled on current regimen. No complaints at this time. LYDIA output was little and ss - was removed at bedside and covered with steristrips. No complications.    PAST MEDICAL & SURGICAL HISTORY:  Tracheal deviation  PUD (peptic ulcer disease)  Status post labral repair of shoulder: (R) Shoulder    No Known Drug Allergies  shellfish (Anaphylaxis)    MEDICATIONS  (STANDING):  heparin   Injectable 5000 Unit(s) SubCutaneous every 8 hours  metoprolol tartrate 12.5 milliGRAM(s) Oral every 6 hours  pantoprazole    Tablet 40 milliGRAM(s) Oral before breakfast  polyethylene glycol 3350 17 Gram(s) Oral daily  senna 2 Tablet(s) Oral at bedtime  sodium chloride 0.9% lock flush 3 milliLiter(s) IV Push every 8 hours    MEDICATIONS  (PRN):  acetaminophen   Tablet .. 650 milliGRAM(s) Oral every 6 hours PRN Mild Pain (1 - 3)  oxycodone    5 mG/acetaminophen 325 mG 1 Tablet(s) Oral every 6 hours PRN Severe Pain (7 - 10)      Objective    ICU Vital Signs Last 24 Hrs  T(C): 36.7 (28 Aug 2020 09:00), Max: 38.2 (27 Aug 2020 22:00)  T(F): 98.1 (28 Aug 2020 09:00), Max: 100.7 (27 Aug 2020 22:00)  HR: 84 (28 Aug 2020 05:06) (84 - 94)  BP: 150/89 (28 Aug 2020 05:06) (133/73 - 157/92)  BP(mean): 114 (28 Aug 2020 05:06) (96 - 119)  RR: 18 (28 Aug 2020 05:06) (16 - 18)  SpO2: 100% (28 Aug 2020 05:06) (98% - 100%)      PHYSICAL EXAM:    CONSTITUTIONAL: Well nourished, well developed, NON-DYSMORPHIC, and in no acute distress.    HEAD: normocephalic, atraumatic.  EARS: The right/left pinna was normal.   NOSE: Normal external nose.   ORAL CAVITY/OROPHARYNX: normal mucosa. No erythema, lesions or bleeding.  NECK: Neck incision c/d/i. LYDIA 1x draining ss  RESPIRATORY: Respirations unlabored, no increased work of breathing with use of accessory muscles and retractions. No stridor.  CARDIAC: Warm extremities, no cyanosis.                           11.7   9.24  )-----------( 203      ( 28 Aug 2020 06:16 )             34.2     08-28    137  |  102  |  10  ----------------------------<  104<H>  4.0   |  24  |  0.87    Ca    8.8      28 Aug 2020 06:16  Phos  2.6     08-27  Mg     2.0     08-28    TPro  6.9  /  Alb  3.8  /  TBili  0.4  /  DBili  x   /  AST  21  /  ALT  11  /  AlkPhos  63  08-27      I&O's Summary    27 Aug 2020 07:01  -  28 Aug 2020 07:00  --------------------------------------------------------  IN: 210 mL / OUT: 460 mL / NET: -250 mL

## 2020-08-28 NOTE — PROGRESS NOTE ADULT - SUBJECTIVE AND OBJECTIVE BOX
Patient discussed on morning rounds with Dr. Mckinnon     Operation / Date: 8/25/20 - thyroidectomy (through collar incision) and R RA thoracotomy approach     SUBJECTIVE ASSESSMENT:  Patient is feeling well this morning. No complaints. Eating/drinking well. Moved his bowels yesterday. Denies any CP, palpitations, SOB, wheezing, abd pain, n/v/d/c, fevers or chills.    Vital Signs Last 24 Hrs  T(C): 36.7 (28 Aug 2020 09:00), Max: 38.2 (27 Aug 2020 22:00)  T(F): 98.1 (28 Aug 2020 09:00), Max: 100.7 (27 Aug 2020 22:00)  HR: 84 (28 Aug 2020 05:06) (78 - 94)  BP: 150/89 (28 Aug 2020 05:06) (133/73 - 157/92)  BP(mean): 114 (28 Aug 2020 05:06) (96 - 119)  RR: 18 (28 Aug 2020 05:06) (16 - 19)  SpO2: 100% (28 Aug 2020 05:06) (98% - 100%)  I&O's Detail    27 Aug 2020 07:01  -  28 Aug 2020 07:00  --------------------------------------------------------  IN:    IV PiggyBack: 50 mL    Oral Fluid: 150 mL    sodium chloride 0.9%: 10 mL  Total IN: 210 mL    OUT:    Bulb: 35 mL    Chest Tube: 25 mL    Voided: 400 mL  Total OUT: 460 mL    Total NET: -250 mL      CHEST TUBE:  no  MARÍA ELENA DRAIN:  Yes.  EPICARDIAL WIRES: no.  TIE DOWNS: No.  JANSEN: No.    PHYSICAL EXAM:  General: well appearing sitting in chair in NAD   Neurological: AOx3. Motor skills grossly intact  Cardiovascular: Normal S1/S2. Regular rate/rhythm. No murmurs  Respiratory: Lungs CTA bilaterally. No wheezing or rales  Gastrointestinal: +BS in all 4 quadrants. Non-distended. Soft. Non-tender  Extremities: Strength 5/5 b/l upper/lower extremities. Sensation grossly intact upper/lower extremities. No edema. No calf tenderness.  Vascular: Radial 2+bilaterally, DP 2+ b/l  Incision Sites: Transverse midline suprasternal neck incision without erythema, purulence or ecchymosis. Sternotomy incision without erythema, purulence or ecchymosis.       LABS:                        11.7   9.24  )-----------( 203      ( 28 Aug 2020 06:16 )             34.2       COUMADIN: No.     PT/INR - ( 27 Aug 2020 10:18 )   PT: 14.5 sec;   INR: 1.22     PTT - ( 27 Aug 2020 10:18 )  PTT:33.0 sec    08-28    137  |  102  |  10  ----------------------------<  104<H>  4.0   |  24  |  0.87    Ca    8.8      28 Aug 2020 06:16  Phos  2.6     08-27  Mg     2.0     08-28    TPro  6.9  /  Alb  3.8  /  TBili  0.4  /  DBili  x   /  AST  21  /  ALT  11  /  AlkPhos  63  08-27      MEDICATIONS  (STANDING):  heparin   Injectable 5000 Unit(s) SubCutaneous every 8 hours  metoprolol tartrate 12.5 milliGRAM(s) Oral every 6 hours  pantoprazole    Tablet 40 milliGRAM(s) Oral before breakfast  polyethylene glycol 3350 17 Gram(s) Oral daily  senna 2 Tablet(s) Oral at bedtime  sodium chloride 0.9% lock flush 3 milliLiter(s) IV Push every 8 hours    MEDICATIONS  (PRN):  acetaminophen   Tablet .. 650 milliGRAM(s) Oral every 6 hours PRN Mild Pain (1 - 3)  oxycodone    5 mG/acetaminophen 325 mG 1 Tablet(s) Oral every 6 hours PRN Severe Pain (7 - 10)      RADIOLOGY & ADDITIONAL TESTS:  < from: Xray Chest 1 View- PORTABLE-Routine (08.26.20 @ 04:16) >  IMPRESSION:  Support Devices: ET tube above the karrie. NG tube in the stomach. Right apical chest tube.  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  Lungs/Airways: Mild pulmonary edema, no significant effusion.  Bones/Soft tissues: Unremarkable  < end of copied text >

## 2020-08-29 ENCOUNTER — TRANSCRIPTION ENCOUNTER (OUTPATIENT)
Age: 45
End: 2020-08-29

## 2020-08-29 VITALS
OXYGEN SATURATION: 97 % | HEART RATE: 80 BPM | SYSTOLIC BLOOD PRESSURE: 117 MMHG | RESPIRATION RATE: 14 BRPM | DIASTOLIC BLOOD PRESSURE: 95 MMHG

## 2020-08-29 LAB
ANION GAP SERPL CALC-SCNC: 10 MMOL/L — SIGNIFICANT CHANGE UP (ref 5–17)
BUN SERPL-MCNC: 10 MG/DL — SIGNIFICANT CHANGE UP (ref 7–23)
CALCIUM SERPL-MCNC: 9 MG/DL — SIGNIFICANT CHANGE UP (ref 8.4–10.5)
CALCIUM SERPL-MCNC: 9.3 MG/DL — SIGNIFICANT CHANGE UP (ref 8.4–10.5)
CHLORIDE SERPL-SCNC: 103 MMOL/L — SIGNIFICANT CHANGE UP (ref 96–108)
CO2 SERPL-SCNC: 27 MMOL/L — SIGNIFICANT CHANGE UP (ref 22–31)
CREAT SERPL-MCNC: 0.91 MG/DL — SIGNIFICANT CHANGE UP (ref 0.5–1.3)
GLUCOSE SERPL-MCNC: 101 MG/DL — HIGH (ref 70–99)
HCT VFR BLD CALC: 35.2 % — LOW (ref 39–50)
HGB BLD-MCNC: 11.5 G/DL — LOW (ref 13–17)
MAGNESIUM SERPL-MCNC: 1.9 MG/DL — SIGNIFICANT CHANGE UP (ref 1.6–2.6)
MCHC RBC-ENTMCNC: 28.9 PG — SIGNIFICANT CHANGE UP (ref 27–34)
MCHC RBC-ENTMCNC: 32.7 GM/DL — SIGNIFICANT CHANGE UP (ref 32–36)
MCV RBC AUTO: 88.4 FL — SIGNIFICANT CHANGE UP (ref 80–100)
NRBC # BLD: 0 /100 WBCS — SIGNIFICANT CHANGE UP (ref 0–0)
PLATELET # BLD AUTO: 232 K/UL — SIGNIFICANT CHANGE UP (ref 150–400)
POTASSIUM SERPL-MCNC: 3.8 MMOL/L — SIGNIFICANT CHANGE UP (ref 3.5–5.3)
POTASSIUM SERPL-SCNC: 3.8 MMOL/L — SIGNIFICANT CHANGE UP (ref 3.5–5.3)
PTH-INTACT FLD-MCNC: 42 PG/ML — SIGNIFICANT CHANGE UP (ref 15–65)
RBC # BLD: 3.98 M/UL — LOW (ref 4.2–5.8)
RBC # FLD: 13.6 % — SIGNIFICANT CHANGE UP (ref 10.3–14.5)
SODIUM SERPL-SCNC: 140 MMOL/L — SIGNIFICANT CHANGE UP (ref 135–145)
WBC # BLD: 7.83 K/UL — SIGNIFICANT CHANGE UP (ref 3.8–10.5)
WBC # FLD AUTO: 7.83 K/UL — SIGNIFICANT CHANGE UP (ref 3.8–10.5)

## 2020-08-29 PROCEDURE — 36415 COLL VENOUS BLD VENIPUNCTURE: CPT

## 2020-08-29 PROCEDURE — 82803 BLOOD GASES ANY COMBINATION: CPT

## 2020-08-29 PROCEDURE — 86923 COMPATIBILITY TEST ELECTRIC: CPT

## 2020-08-29 PROCEDURE — 85025 COMPLETE CBC W/AUTO DIFF WBC: CPT

## 2020-08-29 PROCEDURE — 83970 ASSAY OF PARATHORMONE: CPT

## 2020-08-29 PROCEDURE — S2900: CPT

## 2020-08-29 PROCEDURE — 84100 ASSAY OF PHOSPHORUS: CPT

## 2020-08-29 PROCEDURE — 84132 ASSAY OF SERUM POTASSIUM: CPT

## 2020-08-29 PROCEDURE — 71045 X-RAY EXAM CHEST 1 VIEW: CPT

## 2020-08-29 PROCEDURE — 92610 EVALUATE SWALLOWING FUNCTION: CPT

## 2020-08-29 PROCEDURE — 82310 ASSAY OF CALCIUM: CPT

## 2020-08-29 PROCEDURE — 71045 X-RAY EXAM CHEST 1 VIEW: CPT | Mod: 26

## 2020-08-29 PROCEDURE — 80053 COMPREHEN METABOLIC PANEL: CPT

## 2020-08-29 PROCEDURE — 86850 RBC ANTIBODY SCREEN: CPT

## 2020-08-29 PROCEDURE — 82330 ASSAY OF CALCIUM: CPT

## 2020-08-29 PROCEDURE — 85610 PROTHROMBIN TIME: CPT

## 2020-08-29 PROCEDURE — 85730 THROMBOPLASTIN TIME PARTIAL: CPT

## 2020-08-29 PROCEDURE — 85018 HEMOGLOBIN: CPT

## 2020-08-29 PROCEDURE — 83735 ASSAY OF MAGNESIUM: CPT

## 2020-08-29 PROCEDURE — 82962 GLUCOSE BLOOD TEST: CPT

## 2020-08-29 PROCEDURE — 83036 HEMOGLOBIN GLYCOSYLATED A1C: CPT

## 2020-08-29 PROCEDURE — 80048 BASIC METABOLIC PNL TOTAL CA: CPT

## 2020-08-29 PROCEDURE — 88307 TISSUE EXAM BY PATHOLOGIST: CPT

## 2020-08-29 PROCEDURE — 86901 BLOOD TYPING SEROLOGIC RH(D): CPT

## 2020-08-29 PROCEDURE — 84295 ASSAY OF SERUM SODIUM: CPT

## 2020-08-29 PROCEDURE — P9045: CPT

## 2020-08-29 PROCEDURE — 83605 ASSAY OF LACTIC ACID: CPT

## 2020-08-29 PROCEDURE — 85027 COMPLETE CBC AUTOMATED: CPT

## 2020-08-29 PROCEDURE — 94002 VENT MGMT INPAT INIT DAY: CPT

## 2020-08-29 RX ORDER — METOPROLOL TARTRATE 50 MG
25 TABLET ORAL EVERY 12 HOURS
Refills: 0 | Status: DISCONTINUED | OUTPATIENT
Start: 2020-08-29 | End: 2020-08-29

## 2020-08-29 RX ORDER — OXYCODONE AND ACETAMINOPHEN 5; 325 MG/1; MG/1
1 TABLET ORAL
Qty: 20 | Refills: 0
Start: 2020-08-29 | End: 2020-09-02

## 2020-08-29 RX ORDER — ACETAMINOPHEN 500 MG
2 TABLET ORAL
Qty: 112 | Refills: 0
Start: 2020-08-29 | End: 2020-09-11

## 2020-08-29 RX ORDER — SENNA PLUS 8.6 MG/1
2 TABLET ORAL
Qty: 14 | Refills: 0
Start: 2020-08-29 | End: 2020-09-04

## 2020-08-29 RX ORDER — METOPROLOL TARTRATE 50 MG
12.5 TABLET ORAL ONCE
Refills: 0 | Status: COMPLETED | OUTPATIENT
Start: 2020-08-29 | End: 2020-08-29

## 2020-08-29 RX ORDER — METOPROLOL TARTRATE 50 MG
1 TABLET ORAL
Qty: 60 | Refills: 0
Start: 2020-08-29 | End: 2020-09-27

## 2020-08-29 RX ORDER — POLYETHYLENE GLYCOL 3350 17 G/17G
17 POWDER, FOR SOLUTION ORAL
Qty: 119 | Refills: 0
Start: 2020-08-29 | End: 2020-09-04

## 2020-08-29 RX ADMIN — PANTOPRAZOLE SODIUM 40 MILLIGRAM(S): 20 TABLET, DELAYED RELEASE ORAL at 06:33

## 2020-08-29 RX ADMIN — HEPARIN SODIUM 5000 UNIT(S): 5000 INJECTION INTRAVENOUS; SUBCUTANEOUS at 06:33

## 2020-08-29 RX ADMIN — SODIUM CHLORIDE 3 MILLILITER(S): 9 INJECTION INTRAMUSCULAR; INTRAVENOUS; SUBCUTANEOUS at 06:34

## 2020-08-29 RX ADMIN — OXYCODONE AND ACETAMINOPHEN 1 TABLET(S): 5; 325 TABLET ORAL at 08:50

## 2020-08-29 RX ADMIN — POLYETHYLENE GLYCOL 3350 17 GRAM(S): 17 POWDER, FOR SOLUTION ORAL at 09:16

## 2020-08-29 RX ADMIN — Medication 12.5 MILLIGRAM(S): at 06:33

## 2020-08-29 RX ADMIN — Medication 12.5 MILLIGRAM(S): at 02:38

## 2020-08-29 RX ADMIN — OXYCODONE AND ACETAMINOPHEN 1 TABLET(S): 5; 325 TABLET ORAL at 02:37

## 2020-08-29 RX ADMIN — OXYCODONE AND ACETAMINOPHEN 1 TABLET(S): 5; 325 TABLET ORAL at 02:49

## 2020-08-29 RX ADMIN — OXYCODONE AND ACETAMINOPHEN 1 TABLET(S): 5; 325 TABLET ORAL at 09:45

## 2020-08-29 RX ADMIN — OXYCODONE AND ACETAMINOPHEN 1 TABLET(S): 5; 325 TABLET ORAL at 08:15

## 2020-08-29 RX ADMIN — Medication 12.5 MILLIGRAM(S): at 09:16

## 2020-08-29 NOTE — DISCHARGE NOTE PROVIDER - CARE PROVIDERS DIRECT ADDRESSES
[email or web address]

## 2020-08-29 NOTE — PROGRESS NOTE ADULT - PROVIDER SPECIALTY LIST ADULT
Critical Care
ENT
Thoracic Surgery
ENT

## 2020-08-29 NOTE — DISCHARGE NOTE PROVIDER - NSDCCPTREATMENT_GEN_ALL_CORE_FT
PRINCIPAL PROCEDURE  Procedure: Surgical removal of mass of thyroid  Findings and Treatment: right robotic assisted thoracoscopy approach

## 2020-08-29 NOTE — DISCHARGE NOTE PROVIDER - NSDCFUADDINST_GEN_ALL_CORE_FT
-Walk daily as tolerated and use your incentive spirometer every hour.    -No driving or strenuous activity/exercise for 6 weeks, or until cleared by your surgeon.    -Gently clean your incisions with anti-bacterial soap and water, pat dry.  You may leave them open to air.    -Call your doctor if you have shortness of breath, chest pain not relieved by pain medication, dizziness, fever >101.5, or increased redness or drainage from incisions. - You have one stitch in place where your chest tube was. This will be removed at your follow up visit with Dr. Mckinnon. Please keep this area covered until your outpatient appointment.     -Walk daily as tolerated and use your incentive spirometer every hour.    -No driving or strenuous activity/exercise for 6 weeks, or until cleared by your surgeon.    -Gently clean your incisions with anti-bacterial soap and water, pat dry.  You may leave them open to air.    -Call your doctor if you have shortness of breath, chest pain not relieved by pain medication, dizziness, fever >101.5, or increased redness or drainage from incisions.

## 2020-08-29 NOTE — DISCHARGE NOTE NURSING/CASE MANAGEMENT/SOCIAL WORK - PATIENT PORTAL LINK FT
You can access the FollowMyHealth Patient Portal offered by [facility] by registering at the following website: [email or web address]. By joining [facility]’s FollowMyHealth portal, you will also be able to view your health information using other applications (apps) compatible with our system.

## 2020-08-29 NOTE — PROGRESS NOTE ADULT - SUBJECTIVE AND OBJECTIVE BOX
ENT Progress Note    Subjective    44M s/p hemisternotomy, VATS and hemithyroidectomy on 8/25.    Pt seen and examined at bedside. No acute events overnight. Pt is breathing comfortably on room air. Tolerating diet with no nausea/vomiting. Pain well controlled on current regimen. No complaints at this time. LYDIA output was little and ss - was removed at bedside and covered with steristrips. No complications.    course:  8/29: NAEON. doing well. some hoarseness    PAST MEDICAL & SURGICAL HISTORY:  Tracheal deviation  PUD (peptic ulcer disease)  Status post labral repair of shoulder: (R) Shoulder    No Known Drug Allergies  shellfish (Anaphylaxis)    MEDICATIONS  (STANDING):  heparin   Injectable 5000 Unit(s) SubCutaneous every 8 hours  metoprolol tartrate 12.5 milliGRAM(s) Oral every 6 hours  pantoprazole    Tablet 40 milliGRAM(s) Oral before breakfast  polyethylene glycol 3350 17 Gram(s) Oral daily  senna 2 Tablet(s) Oral at bedtime  sodium chloride 0.9% lock flush 3 milliLiter(s) IV Push every 8 hours    MEDICATIONS  (PRN):  acetaminophen   Tablet .. 650 milliGRAM(s) Oral every 6 hours PRN Mild Pain (1 - 3)  oxycodone    5 mG/acetaminophen 325 mG 1 Tablet(s) Oral every 6 hours PRN Severe Pain (7 - 10)      Objective    Vital Signs Last 24 Hrs  T(C): 36.5 (29 Aug 2020 09:06), Max: 37 (29 Aug 2020 01:01)  T(F): 97.7 (29 Aug 2020 09:06), Max: 98.6 (29 Aug 2020 01:01)  HR: 80 (29 Aug 2020 11:20) (80 - 82)  BP: 117/95 (29 Aug 2020 11:20) (117/95 - 145/93)  BP(mean): 113 (29 Aug 2020 09:06) (105 - 113)  RR: 14 (29 Aug 2020 11:20) (14 - 18)  SpO2: 97% (29 Aug 2020 11:20) (97% - 100%)    PHYSICAL EXAM:    CONSTITUTIONAL: Well nourished, well developed, NON-DYSMORPHIC, and in no acute distress. patient has some hoarseness  HEAD: normocephalic, atraumatic.  EARS: The right/left pinna was normal.   NOSE: Normal external nose.   ORAL CAVITY/OROPHARYNX: normal mucosa. No erythema, lesions or bleeding.  NECK: Neck incision c/d/i.   RESPIRATORY: Respirations unlabored, no increased work of breathing with use of accessory muscles and retractions. No stridor.  CARDIAC: Warm extremities, no cyanosis.                           11.7   9.24  )-----------( 203      ( 28 Aug 2020 06:16 )             34.2     08-28    137  |  102  |  10  ----------------------------<  104<H>  4.0   |  24  |  0.87    Ca    8.8      28 Aug 2020 06:16  Phos  2.6     08-27  Mg     2.0     08-28    TPro  6.9  /  Alb  3.8  /  TBili  0.4  /  DBili  x   /  AST  21  /  ALT  11  /  AlkPhos  63  08-27      I&O's Summary    27 Aug 2020 07:01  -  28 Aug 2020 07:00  --------------------------------------------------------  IN: 210 mL / OUT: 460 mL / NET: -250 mL      44M s/p hemisternotomy, VATS and hemithyroidectomy on 8/25.  - s/p drain removal  - Stable per ENT  - No Ca/PTH required because hemithyroid, but stable levels  - Pain control  - DASH diet  - Sternotomy management per CTS  - Contact ENT if any issues  - dc today

## 2020-08-29 NOTE — DISCHARGE NOTE NURSING/CASE MANAGEMENT/SOCIAL WORK - NSDCFUADDAPPT_GEN_ALL_CORE_FT
-Please follow up with Dr. Mckinnon on 9/3/20 at 9:45 AM.  The office is located at [facility], [facility], 4th floor. Call us with any questions #[phone].    - Please also follow up with Dr. Glen Villavicencio (ENT/referring) within 2 weeks of discharge. Please call to make an appointment: [phone]

## 2020-08-29 NOTE — DISCHARGE NOTE PROVIDER - NSDCMRMEDTOKEN_GEN_ALL_CORE_FT
acetaminophen 325 mg oral tablet: 2 tab(s) orally every 6 hours, As needed, Mild Pain (1 - 3) acetaminophen 325 mg oral tablet: 2 tab(s) orally every 6 hours, As needed, Mild Pain (1 - 3)  metoprolol tartrate 25 mg oral tablet: 1 tab(s) orally every 12 hours   oxycodone-acetaminophen 5 mg-325 mg oral tablet: 1 tab(s) orally every 6 hours, As needed, Severe Pain (7 - 10) MDD:4 tabs  polyethylene glycol 3350 oral powder for reconstitution: 17 gram(s) orally once a day, As Needed -for constipation     HOLD FOR LOOSE STOOL  senna oral tablet: 2 tab(s) orally once a day (at bedtime), As Needed -for constipation

## 2020-08-29 NOTE — DISCHARGE NOTE PROVIDER - NSDCACTIVITY_GEN_ALL_CORE
Do not drive or operate machinery/Walking - Outdoors allowed/No heavy lifting/straining/Do not make important decisions/Stairs allowed/Showering allowed/Walking - Indoors allowed

## 2020-08-29 NOTE — DISCHARGE NOTE PROVIDER - HOSPITAL COURSE
Mr. Bear is a 44 y.o male, current smoker, with PMHx of PUD and recent findings of mediastinal thyroid mass evaluated as an outpatient and presented to [facility] for elective procedure on 8/25/2020. Patient underwent a thyroid mass removal via danilo-sternotomy and R VATS incisions with Dr. Mckinnon. Procedure was uncomplicated and patient arrived to ICU intubated and stable. He was extubated POD#1 after glidescope exam. He passed S&S evaluation and was advanced to regular diet on POD#2 and remained stable and transferred to . POD#3 patient's neck julianna was removed by ENT. As per Dr. Rey the patient was stable and ready for discharge home on POD#4. At time of discharge patient was ambulating comfortably on room air, tolerating PO diet, having bowel movements, and voiding freely.        Over 30 minutes was spent with the patient reviewing the discharge material including medications, follow up appointments, recovery, concerning symptoms, and how to contact their health care providers if they have questions

## 2020-08-29 NOTE — PROGRESS NOTE ADULT - SUBJECTIVE AND OBJECTIVE BOX
Patient discussed on morning rounds with Dr. Rey    Operation / Date: 8/25/20 R VATS/ danilo sternotomy, removal of thyroid mass     Surgeon: Dr. Mckinnon    Referring Physician: Dr. Villavicencio    SUBJECTIVE ASSESSMENT:  44y Male assessed at bedside this AM. Patient feels well and is ready to go home. He denies pain, chest pain, SOB. He is ambulating without difficulty. He states he had a bowel movement yesterday. Is using IS (750 cc). No acute complaints.     HOSPITAL COURSE:  Mr. Bear is a 44 y.o male, current smoker, with PMHx of PUD and recent findings of mediastinal thyroid mass evaluated as an outpatient and presented to [facility] for elective procedure on 8/25/2020. Patient underwent a thyroid mass removal via danilo-sternotomy and R VATS incisions with Dr. Mckinnon. Procedure was uncomplicated and patient arrived to ICU intubated and stable. He was extubated POD#1 after glidescope exam. He passed S&S evaluation and was advanced to regular diet on POD#2 and remained stable and transferred to . POD#3 patient's neck julianna was removed by ENT. As per Dr. Rey the patient was stable and ready for discharge home on POD#4. At time of discharge patient was ambulating comfortably on room air, tolerating PO diet, having bowel movements, and voiding freely.    Over 30 minutes was spent with the patient reviewing the discharge material including medications, follow up appointments, recovery, concerning symptoms, and how to contact their health care providers if they have questions    Vital Signs Last 24 Hrs  T(C): 36.5 (29 Aug 2020 09:06), Max: 37 (29 Aug 2020 01:01)  T(F): 97.7 (29 Aug 2020 09:06), Max: 98.6 (29 Aug 2020 01:01)  HR: 82 (29 Aug 2020 09:06) (82 - 82)  BP: 145/93 (29 Aug 2020 09:06) (131/89 - 145/93)  BP(mean): 113 (29 Aug 2020 09:06) (105 - 113)  RR: 15 (29 Aug 2020 09:06) (15 - 18)  SpO2: 100% (29 Aug 2020 09:06) (98% - 100%)    EPICARDIAL WIRES REMOVED: Yes/No.  TIE DOWNS REMOVED: Yes/No.    PHYSICAL EXAM:    General:     Neurological:    Cardiovascular:    Respiratory:    Gastrointestinal:    Extremities:    Vascular:    Incision Sites:    LABS:                        11.5   7.83  )-----------( 232      ( 29 Aug 2020 05:29 )             35.2       COUMADIN:  Yes/No.        DOSE:                  INDICATION:                GOAL INR:        08-29    140  |  103  |  10  ----------------------------<  101<H>  3.8   |  27  |  0.91    Ca    9.3      29 Aug 2020 05:29  Mg     1.9     08-29            Discharge CXR:    Discharge ECHO: Patient discussed on morning rounds with Dr. Rey    Operation / Date: 8/25/20 R VATS/ brian sternotomy, removal of thyroid mass     Surgeon: Dr. Mckinnon    Referring Physician: Dr. Villavicencio    SUBJECTIVE ASSESSMENT:  44y Male assessed at bedside this AM. Patient feels well and is ready to go home. He denies pain, chest pain, SOB. He is ambulating without difficulty. He states he had a bowel movement yesterday. Is using IS (750 cc). No acute complaints.     HOSPITAL COURSE:  Mr. Viramontes is a 44 y.o male, current smoker, with PMHx of PUD and recent findings of mediastinal thyroid mass evaluated as an outpatient and presented to [facility] for elective procedure on 8/25/2020. Patient underwent a thyroid mass removal via brian-sternotomy and R VATS incisions with Dr. Mckinnon. Procedure was uncomplicated and patient arrived to ICU intubated and stable. He was extubated POD#1 after glidescope exam. He passed S&S evaluation and was advanced to regular diet on POD#2 and remained stable and transferred to . POD#3 patient's neck julianna was removed by ENT. As per Dr. Rey the patient was stable and ready for discharge home on POD#4. At time of discharge patient was ambulating comfortably on room air, tolerating PO diet, having bowel movements, and voiding freely.    Over 30 minutes was spent with the patient reviewing the discharge material including medications, follow up appointments, recovery, concerning symptoms, and how to contact their health care providers if they have questions    Vital Signs Last 24 Hrs  T(C): 36.5 (29 Aug 2020 09:06), Max: 37 (29 Aug 2020 01:01)  T(F): 97.7 (29 Aug 2020 09:06), Max: 98.6 (29 Aug 2020 01:01)  HR: 82 (29 Aug 2020 09:06) (82 - 82)  BP: 145/93 (29 Aug 2020 09:06) (131/89 - 145/93)  BP(mean): 113 (29 Aug 2020 09:06) (105 - 113)  RR: 15 (29 Aug 2020 09:06) (15 - 18)  SpO2: 100% (29 Aug 2020 09:06) (98% - 100%)    EPICARDIAL WIRES REMOVED: N/A  TIE DOWNS REMOVED: No    Physical Exam  CONSTITUTIONAL: Well appearing in NAD assessed laying comfortably in bed   NEURO: A&OX3. No focal deficits noted, moving bilateral upper and lower extremities                    CV: RRR, no murmurs, rubs, gallops  RESPIRATORY: Clear to auscultation bilateral posterior lung fields, no wheezes, rales, rhonchi   GI: +BS, NT/ND  MUSKULOSKELETAL: No peripheral edema or calf tenderness. Full strength and ROM bilateral upper and lower extremities   VASCULAR: Bilateral distal pulses 2+  INCISIONS: Brian sternotomy incision clean, dry, intact. Transverse midline incision without erythema, purulence, ecchymosis. R VATS incisions clean, dry, intact.       LABS:                        11.5   7.83  )-----------( 232      ( 29 Aug 2020 05:29 )             35.2       COUMADIN:  No    08-29    140  |  103  |  10  ----------------------------<  101<H>  3.8   |  27  |  0.91    Ca    9.3      29 Aug 2020 05:29  Mg     1.9     08-29      Discharge CXR:  < from: Xray Chest 1 View- PORTABLE-Routine (08.29.20 @ 04:45) >  INTERPRETATION:  Clinical History: Postop    Frontal examination of the chest demonstrates the heart to be within normal limits in transverse diameter. No acute infiltrates. Status post Sternotomy.    IMPRESSION: No acute infiltrates    < end of copied text >     Med Reconciliation:  Medication Reconciliation Status	Admission Reconciliation is Completed Discharge Reconciliation is Completed	  Discharge Medications	acetaminophen 325 mg oral tablet: 2 tab(s) orally every 6 hours, As needed, Mild Pain (1 - 3) metoprolol tartrate 25 mg oral tablet: 1 tab(s) orally every 12 hours  oxycodone-acetaminophen 5 mg-325 mg oral tablet: 1 tab(s) orally every 6 hours, As needed, Severe Pain (7 - 10) MDD:4 tabs polyethylene glycol 3350 oral powder for reconstitution: 17 gram(s) orally once a day, As Needed -for constipation   HOLD FOR LOOSE STOOL senna oral tablet: 2 tab(s) orally once a day (at bedtime), As Needed -for constipation	  		  ,	  ,	     Care Plan/Procedures:  Goal(s)	To get better and follow your care plan as instructed.	  Discharge Diagnoses, Assessment and Plan of Treatment	PRINCIPAL DISCHARGE DIAGNOSIS Diagnosis: Thyroid mass Assessment and Plan of Treatment:	  Discharge Procedures, Findings and Treatment	PRINCIPAL PROCEDURE Procedure: Surgical removal of mass of thyroid Findings and Treatment: right robotic assisted thoracoscopy approach	     Follow Up:  Care Providers for Follow up (PCP/Outpatient Provider)	Juan Mckinnon (MD) Thoracic Surgery [address] [place] [address] Phone: [phone] Fax: [phone] Follow Up Time:   [facility] Villavicenc[facility] [address] [place] [address] Phone: [phone] Fax: [phone] Follow Up Time:	  Patient's Scheduled Appointments	KEVAN VIRAMONTES ; 09/03/2020 ; J[facility]ON Andrade [address]	  		  Additional Scheduled Appointments	-Please follow up with Dr. Mckinnon on 9/3/20 at 9:45 AM.  The office is located at [facility], [address] [facility]. Call us with any questions #[phone].  - Please also follow up with Dr. Glen Villavicencio (ENT/referring) within 2 weeks of discharge. Please call to make an appointment: [phone]	  Activity	Do not drive or operate machinery, Do not make important decisions, No heavy lifting/straining, Showering allowed, Stairs allowed, Walking - Indoors allowed, Walking - Outdoors allowed	  Additional Instructions	- You have one stitch in place where your chest tube was. This will be removed at your follow up visit with Dr. Mckinnon. Please keep this area covered until your outpatient appointment.   -Walk daily as tolerated and use your incentive spirometer every hour.  -No driving or strenuous activity/exercise for 6 weeks, or until cleared by your surgeon.  -Gently clean your incisions with anti-bacterial soap and water, pat dry.  You may leave them open to air.  -Call your doctor if you have shortness of breath, chest pain not relieved by pain medication, dizziness, fever >101.5, or increased redness or drainage from incisions.

## 2020-08-29 NOTE — DISCHARGE NOTE PROVIDER - NSDCFUADDAPPT_GEN_ALL_CORE_FT
-Please follow up with Dr. Mckinnon on 9/3/20 at 9:45 AM.  The office is located at [facility], [facility], 4th floor. Call us with any questions #[phone].    - Please also follow up with Dr. Glen Villavicencio (ENT/referring) within 2 weeks of discharge. Please call to make an appointment: [phone]

## 2020-08-29 NOTE — DISCHARGE NOTE PROVIDER - NSDCFUSCHEDAPPT_GEN_ALL_CORE_FT
KEVAN VIRAMONTES ; 09/03/2020 ; J[facility] Andrade [address] [place] [address] KEVAN VIRAMONTES ; 09/03/2020 ; [facility] Andrade [address] [place] [address] KEVAN VIRAMONTES ; 09/03/2020 ; [facility] Andrade [address] [place] [address]

## 2020-08-29 NOTE — DISCHARGE NOTE PROVIDER - CARE PROVIDER_API CALL
Juan Mckinnon)  Thoracic Surgery  [address] [place] [address]  Phone: [phone]  Fax: [phone]  Follow Up Time:     Glen Villavicencio  OTOLARYNGOLOGY  [address] [place] [address]  Phone: [phone]  Fax: [phone]  Follow Up Time:

## 2020-08-29 NOTE — PROGRESS NOTE ADULT - REASON FOR ADMISSION
mediastinal mass

## 2020-08-29 NOTE — DISCHARGE NOTE PROVIDER - NSDCHHNEEDSERVICE_GEN_ALL_CORE
Wound care and assessment/Teaching and training/Rehabilitation services/Medication teaching and assessment/Observation and assessment

## 2020-09-02 ENCOUNTER — APPOINTMENT (OUTPATIENT)
Dept: OTOLARYNGOLOGY | Facility: CLINIC | Age: 45
End: 2020-09-02
Payer: COMMERCIAL

## 2020-09-02 VITALS
OXYGEN SATURATION: 98 % | DIASTOLIC BLOOD PRESSURE: 87 MMHG | SYSTOLIC BLOOD PRESSURE: 132 MMHG | BODY MASS INDEX: 23.87 KG/M2 | TEMPERATURE: 97.3 F | WEIGHT: 186 LBS | HEIGHT: 74 IN | HEART RATE: 80 BPM

## 2020-09-02 DIAGNOSIS — E87.5 HYPERKALEMIA: ICD-10-CM

## 2020-09-02 DIAGNOSIS — E04.9 NONTOXIC GOITER, UNSPECIFIED: ICD-10-CM

## 2020-09-02 DIAGNOSIS — I97.3 POSTPROCEDURAL HYPERTENSION: ICD-10-CM

## 2020-09-02 DIAGNOSIS — E83.42 HYPOMAGNESEMIA: ICD-10-CM

## 2020-09-02 DIAGNOSIS — J98.59 OTHER DISEASES OF MEDIASTINUM, NOT ELSEWHERE CLASSIFIED: ICD-10-CM

## 2020-09-02 DIAGNOSIS — E83.39 OTHER DISORDERS OF PHOSPHORUS METABOLISM: ICD-10-CM

## 2020-09-02 DIAGNOSIS — F17.210 NICOTINE DEPENDENCE, CIGARETTES, UNCOMPLICATED: ICD-10-CM

## 2020-09-02 DIAGNOSIS — Z87.11 PERSONAL HISTORY OF PEPTIC ULCER DISEASE: ICD-10-CM

## 2020-09-02 PROCEDURE — 31575 DIAGNOSTIC LARYNGOSCOPY: CPT | Mod: 58

## 2020-09-02 PROCEDURE — 99024 POSTOP FOLLOW-UP VISIT: CPT

## 2020-09-03 ENCOUNTER — APPOINTMENT (OUTPATIENT)
Dept: THORACIC SURGERY | Facility: CLINIC | Age: 45
End: 2020-09-03
Payer: COMMERCIAL

## 2020-09-03 ENCOUNTER — OUTPATIENT (OUTPATIENT)
Dept: OUTPATIENT SERVICES | Facility: HOSPITAL | Age: 45
LOS: 1 days | End: 2020-09-03
Payer: COMMERCIAL

## 2020-09-03 VITALS
OXYGEN SATURATION: 95 % | WEIGHT: 182 LBS | BODY MASS INDEX: 23.36 KG/M2 | DIASTOLIC BLOOD PRESSURE: 83 MMHG | HEIGHT: 74 IN | HEART RATE: 78 BPM | TEMPERATURE: 97 F | RESPIRATION RATE: 18 BRPM | SYSTOLIC BLOOD PRESSURE: 126 MMHG

## 2020-09-03 DIAGNOSIS — Z09 ENCOUNTER FOR FOLLOW-UP EXAMINATION AFTER COMPLETED TREATMENT FOR CONDITIONS OTHER THAN MALIGNANT NEOPLASM: ICD-10-CM

## 2020-09-03 DIAGNOSIS — Z98.890 OTHER SPECIFIED POSTPROCEDURAL STATES: Chronic | ICD-10-CM

## 2020-09-03 PROCEDURE — 71046 X-RAY EXAM CHEST 2 VIEWS: CPT

## 2020-09-03 PROCEDURE — 71046 X-RAY EXAM CHEST 2 VIEWS: CPT | Mod: 26

## 2020-09-03 PROCEDURE — 99024 POSTOP FOLLOW-UP VISIT: CPT

## 2020-09-10 NOTE — ASSESSMENT
[FreeTextEntry1] : 44M who underwent right hemithyroidectomy via neck+right thorax (robotic)+ sternotomy for goiter. Well healing scars, denies voice change/swallowing/breathing problems. \par \par Plan:\par - F/u in 2 weeks.\par - SLP f/u\par - if any concerns/questions arise, please contact our clinic. \par \par \par \par

## 2020-09-10 NOTE — PHYSICAL EXAM
[Midline] : trachea located in midline position [Laryngoscopy Performed] : laryngoscopy was performed, see procedure section for findings [Normal] : orientation to person, place, and time: normal [de-identified] : Well healing neck scar, no signs of infection

## 2020-09-10 NOTE — HISTORY OF PRESENT ILLNESS
[de-identified] : 44M, 30 pack year smoking history,  who presents per referral from CT surgery for incidental finding of large right low lobe thyroid mass. Patient reports last week he had SOB and cough which prompted him to go to [facility] ER. There, imaging was performed and he was found to have emphysema with overlying bronchitis and a R thyroid lower lobe mass that extended intrathoracically causing leftward tracheal deviation. He does admit to occasional globus sensation for >1 year. He denies any current SOB, neck pain, sore throat, odynophagia, dysphagia of solids or liquids, hoarseness. \par \par He does admit that when being worked up for a shoulder surgery there was imaging done showing the mass at that time and he was recommended to follow up with a surgeon, but decided not to since he was asymptomatic. \par \par No other ENT complaints. No pertinent FH.

## 2020-09-10 NOTE — PROCEDURE
[Unable to Cooperate with Mirror] : patient unable to cooperate with mirror [Image(s) Captured] : image(s) captured and filed [de-identified] : Procedure was explained to patient with all risks, benefits and alternatives, all questions answered. Patient was positioned sitting upright with chest out. . Scope was advanced via the oropharynx where no masses, lesions or obstruction was noted. Scope was then advanced to hypopharynx/larynx where no asymmetry, masses, lesions, pooled secretions, right vocal cord paralysis noted with compensation of left vocal cord on adduction Patient tolerated procedure well.\par

## 2020-09-11 LAB — SURGICAL PATHOLOGY STUDY: SIGNIFICANT CHANGE UP

## 2020-09-21 ENCOUNTER — APPOINTMENT (OUTPATIENT)
Dept: OTOLARYNGOLOGY | Facility: CLINIC | Age: 45
End: 2020-09-21
Payer: COMMERCIAL

## 2020-09-21 VITALS
WEIGHT: 182 LBS | HEIGHT: 74 IN | TEMPERATURE: 97.1 F | HEART RATE: 83 BPM | OXYGEN SATURATION: 98 % | BODY MASS INDEX: 23.36 KG/M2 | DIASTOLIC BLOOD PRESSURE: 86 MMHG | SYSTOLIC BLOOD PRESSURE: 131 MMHG

## 2020-09-21 DIAGNOSIS — E07.9 DISORDER OF THYROID, UNSPECIFIED: ICD-10-CM

## 2020-09-21 PROCEDURE — 99024 POSTOP FOLLOW-UP VISIT: CPT

## 2020-09-21 PROCEDURE — 31575 DIAGNOSTIC LARYNGOSCOPY: CPT | Mod: 58

## 2020-09-22 PROBLEM — E07.9 THYROID MASS: Status: ACTIVE | Noted: 2020-08-12

## 2020-09-22 NOTE — REASON FOR VISIT
[Post-Operative Visit] : a post-operative visit [FreeTextEntry2] : s/p 8/25/20 right hemithyroidectomy

## 2020-09-22 NOTE — ASSESSMENT
[FreeTextEntry1] : 44M who underwent 8/25/20 right hemithyroidectomy via neck+right thorax (robotic)+ sternotomy for goiter. Well healed scars, 2 sutures removed today, with voice weakness 2/2 right vocal cord paralysis. \par \par Plan:\par - F/u in 3 months.\par - Vitamin E to skin, avoid direct sun exposure.\par - if any concerns/questions arise, please contact our clinic. \par - Pt verbalized understanding of above.\par

## 2020-09-22 NOTE — PROCEDURE
[Image(s) Captured] : image(s) captured and filed [Unable to Cooperate with Mirror] : patient unable to cooperate with mirror [Hoarseness] : hoarseness not clearly evaluated by indirect laryngoscopy [None] : none [Flexible Endoscope] : examined with the flexible endoscope [de-identified] : Flexible fiberoptic laryngoscope advanced orally, no oropharyngeal lesions or masses identified, larynx visualized, right vocal cord paralysis with adequate compensation of left vocal cord on adduction.\par

## 2020-09-22 NOTE — PHYSICAL EXAM
[Laryngoscopy Performed] : laryngoscopy was performed, see procedure section for findings [Normal] : orientation to person, place, and time: normal [de-identified] : well-healed neck and sternotomy scar, dry, clean and intact. No edema or erythema [de-identified] : parotid and submandibular glands normal

## 2020-09-22 NOTE — HISTORY OF PRESENT ILLNESS
[de-identified] : 44M, 30 pack year smoking history, who presented per referral from CT surgery for incidental finding of large right low lobe thyroid mass. Patient reports last week he had SOB and cough which prompted him to go to [facility] ER. There, imaging was performed and he was found to have emphysema with overlying bronchitis and a R thyroid lower lobe mass that extended intrathoracically causing leftward tracheal deviation. He does admit to occasional globus sensation for >1 year. He denies any current SOB, neck pain, sore throat, odynophagia, dysphagia of solids or liquids, hoarseness. \par \par He does admit that when being worked up for a shoulder surgery there was imaging done showing the mass at that time and he was recommended to follow up with a surgeon, but decided not to since he was asymptomatic. \par \par  [FreeTextEntry1] : He is s/p 8/25/20 right hemithyroidectomy via neck + right thorax (robotic) + sternotomy (Inra), final path thyroid with adenomatoid nodules and benign lymph nodes.  He reports voice weakness, especially after speaking for 10 minutes.  No dysphagia, he is eating and drinking everything slowly, denies coughing/choking/aspiration pneumonia. Denies pain. Has mild itching at scar.

## 2021-02-26 ENCOUNTER — NON-APPOINTMENT (OUTPATIENT)
Age: 46
End: 2021-02-26

## 2022-05-09 NOTE — PROGRESS NOTE ADULT - ASSESSMENT
A/P: 44 year old male, current smoker with PMHx of PUD and recent findings of mediastinal thyroid mass evaluated as an outpatient and presented to [facility] for elective procedure on 8/25/2020. Patient underwent thyroid mass removal via danilo-sternotomy and R VATS incisions with Dr. Mckinnon. Procedure was uncomplicated and patient arrived to ICU intubated and stable. He was extubated POD#1 after glidescope exam. He passed S&S evaluation and was advanced to regular diet on POD#2. He remained stable and transferred to      Neurovascular: Stable. Pain well controlled with current regimen.  - Continue tylenol and perocet PRN     Cardiovascular: Hemodynamically stable. HR controlled.  - Post op HTN, continue metoprolol 12.5mg Q6     Respiratory: 02 Sat = 98% on RA.  - R pleural chest tube removed this AM, f/u CXR stable without any obvious ptx. Keep neck drain on self-suction with plans to remove likely tomorrow   - F/u CXR in AM   - Encourage C+DB and Use of IS 10x / hr while awake..    GI: Stable.  - Continue PO diet   - Continue protonix 40mg for GI ppx   - Continue bowel regimen     Renal / : BUN/Cr 11/0.79   - Monitor renal function.  - Monitor I/O's.    Endocrine:  hgba1c pending, TSH wnl   - Thyroid mass s/p removal.     Prophylaxis:  - DVT prophylaxis with 5000 SubQ Heparin q8h.  - SCD's    Disposition: Home when medically ready, transfer to  [facility]

## 2022-07-06 NOTE — DIETITIAN INITIAL EVALUATION ADULT. - WEIGHT IN LBS
ED CONSULT    Patient: Facundo Singh Attending: No att. providers found   : 2002 Date of Admission:  2022   AGE: 20 year old Current Hospital Day:  Hospital Day: 1   SEX: male Date:  2022 12:58 PM   Code Status: No Order        Chief Complaint:    Left flank pain    Subjective:  I was asked to see Facundo Singh at the request of Kavitha Membreno PA-C for urologic consultation. Facundo Singh is a 20 year old male patient admitted with No admission diagnoses are documented for this encounter.         Facundo Singh is a 20 y.o. male with a PMH significant for mitochondrial disorder, GERD, epilepsy.     History obtained from mother - legal guardian and patient.    He has a urological history significant for chordee and hypospadias for which elected not to undergo surgical repair. Denies history of kidney stones.     He presented to the ED today with c/o left flank pain, nausea, and vomiting that started at 130 am. CT showed a left 2.8mm 2.8 mm calculus in the distal left ureter not causing any  significant hydronephrosis. UA negative for infection. Afebrile, WBC 11.7. Creatinine 0.90.    Facundo is seen lying comfortably in bed with his parents at his bedside. He reports left flank pain that has since improved. He denies dysuria or hematuria. He reports a good stream.       Past Medical History:   Past Medical History:   Diagnosis Date    ADHD (attention deficit hyperactivity disorder)     Anemia 2014    Asymmetric kidneys, acquired 2014    Due for repeat MARLEEN    Chordee 2013    Dehydration     admission    Developmental delay     Disorders of mitochondrial metabolism     2012  [facility]  long term EEG neg;  focalin via dr hammer, continue lamictal,  Ruttum needs yearly eval with eye doc Dr. Aragon  hypermobility rec: genetics     Eczema     Fatigue     Gastritis     on EGD Dr. CAM Hopson omeprazole 40 QD    GERD (gastroesophageal reflux disease)     12  intermittent choking improved with higher dose omeprazole.  can try decresing to 40mg daily but if choking or coughing increase do 2x per day.  fuv 4 months.     Headache(784.0)     Neuro 1/12 weakness acute poss hypoglycemia, negative labs    Hypospadias     3/11 disc +/- of hypospad repair Dr. Clark     Lack of normal physiological development, unspecified     5/12 Dr grider ekg/echo negative f/u 1 year    Other convulsions     5/11 He sleep study negative 12/11 EEG left front temp activity MRI brain with out contrast negative except small focus left lat vent high signal f/u    Seizure (CMS/HCC) 5/6/2020       Surgical History:  History reviewed. No pertinent surgical history.:    Family History:  Family History   Problem Relation Age of Onset    Allergies Other     Anemia Other         Uncle    Asthma Other         half siblings    Cancer Paternal Grandmother         Pancratic    Psychiatric Other     High blood pressure Paternal Grandfather     Diabetes Paternal Grandfather     High cholesterol Other         \"everyone\"    Diabetes Maternal Grandfather     Hypertension Maternal Grandfather     Anemia Maternal Grandfather     Kidney disease Maternal Grandfather     Stroke/TIA Other         Uncle    Diabetes Other         Uncle    Other Mother         Bleeding Problems, Connective Tissue Disease    Bipolar disorder Mother     Hypertension Mother         Pulmonary Atrical     Depression Mother     Bipolar disorder Other         Aunts    Depression Father     Cancer Father         melanoma,pancreas, myloma    Depression Brother        Social History:  Social History     Socioeconomic History    Marital status: Single     Spouse name: Not on file    Number of children: Not on file    Years of education: Not on file    Highest education level: Not on file   Occupational History    Not on file   Tobacco Use    Smoking status: Never Smoker    Smokeless tobacco: Never Used   Substance and Sexual Activity    Alcohol  use: No    Drug use: Not on file    Sexual activity: Not on file   Other Topics Concern    Not on file   Social History Narrative    Not on file     Social Determinants of Health     Financial Resource Strain: Not on file   Food Insecurity: Not on file   Transportation Needs: Not on file   Physical Activity: Not on file   Stress: Not on file   Social Connections: Not on file   Intimate Partner Violence: Not on file       Allergies:  ALLERGIES:   Allergen Reactions    Tylenol     Barbiturates Other (See Comments)     Avoid due to mitochondrial d/o    Chloramphenicol Other (See Comments)     Avoid due to mitochondrial d/o    Metformin Other (See Comments)     Avoid due to mitochondrial d/o    Tetracycline Other (See Comments)     Avoid due to mitochondrial d/o    Topamax SEIZURES    Valproic Acid Other (See Comments)     Counter indicated       Problem List:   There are no active hospital problems to display for this patient.       Prior to Admit Meds:   No current facility-administered medications for this encounter.     Current Outpatient Medications   Medication Sig Dispense Refill    cetirizine-pseudoephedrine (ZyrTEC-D) 5-120 MG per tablet Take 1 tablet by mouth 2 times daily. 14 tablet 0    fluticasone (Flonase) 50 MCG/ACT nasal spray Spray 1 spray in each nostril daily. 16 g 12    cetirizine (ZyrTEC) 10 MG tablet TAKE ONE TABLET BY MOUTH DAILY 90 tablet 0    cloNIDine (CATAPRES) 0.3 MG tablet TAKE ONE-HALF TABLET BY MOUTH NIGHTLY 15 tablet 11    doxycycline hyclate (VIBRAMYCIN) 50 MG capsule Take 50 mg by mouth 2 (two) times a day.      zonisamide (ZONEGRAN) 100 MG capsule Take 100 mg by mouth daily. 2 capsule at bed time      leucovorin (WELLCOVORIN) 10 MG tablet Take 1 tablet by mouth daily. 30 tablet 0    Nutritional Supplements (BOOST HIGH PROTEIN) Liquid This will benefit his difficulty gaining weight and nutritional deficits (Patient taking differently: as needed. This will benefit his difficulty gaining  weight and nutritional deficits) 6 Can 2    acetone, urine, test (KETOSTIX) strip Use PRN during illness 50 each 6    Cholecalciferol (VITAMIN D) 1000 UNITS capsule Take 1,000 Units by mouth daily. This will benefit his nutritional status along with his mitochondrial disorder 90 capsule 1    lamoTRIgine (LAMICTAL) 25 MG tablet Take 150 mg by mouth 2 times daily.          Current Hospital Meds:  No current facility-administered medications for this encounter.     Current Outpatient Medications   Medication Sig    cetirizine-pseudoephedrine (ZyrTEC-D) 5-120 MG per tablet Take 1 tablet by mouth 2 times daily.    fluticasone (Flonase) 50 MCG/ACT nasal spray Spray 1 spray in each nostril daily.    cetirizine (ZyrTEC) 10 MG tablet TAKE ONE TABLET BY MOUTH DAILY    cloNIDine (CATAPRES) 0.3 MG tablet TAKE ONE-HALF TABLET BY MOUTH NIGHTLY    doxycycline hyclate (VIBRAMYCIN) 50 MG capsule Take 50 mg by mouth 2 (two) times a day.    zonisamide (ZONEGRAN) 100 MG capsule Take 100 mg by mouth daily. 2 capsule at bed time    leucovorin (WELLCOVORIN) 10 MG tablet Take 1 tablet by mouth daily.    Nutritional Supplements (BOOST HIGH PROTEIN) Liquid This will benefit his difficulty gaining weight and nutritional deficits (Patient taking differently: as needed. This will benefit his difficulty gaining weight and nutritional deficits)    acetone, urine, test (KETOSTIX) strip Use PRN during illness    Cholecalciferol (VITAMIN D) 1000 UNITS capsule Take 1,000 Units by mouth daily. This will benefit his nutritional status along with his mitochondrial disorder    lamoTRIgine (LAMICTAL) 25 MG tablet Take 150 mg by mouth 2 times daily.        Review of Systems:  Constitutional: Patient denies fever, chills, or unintended weight loss.  Ear/Nose/Throat/Mouth: Patient denies ear infection, sore throat or sinus congestion.  Respiratory: Patient denies wheezing, frequent coughing, shortness of breath or chronic obstructive pulmonary  191.8 disease.  Cardiovascular: Patient denies chest pain, palpitations, or shortness of breath.  Gastrointestinal: Patient denies abdominal pain, nausea, vomiting, indigestion/heartburn, diarrhea or constipation.  Neurological: Patient denies tremors, dizzy spells, numbness/tingling.  Endocrine: Patient denies excessive thirst, too hot, too cold, tired/sluggish or thyroid disease.  Integumentary: Patient denies skin rash, boils or persistent itch.  Musculoskeletal: Patient denies joint pain, neck pain, or back pain..  Genitourinary: See symptoms listed in HPI.  Hematologic/Lymphatic: Patient denies swollen glands or blood clotting problem.  Psychologic: Patient states satisfaction with life and denies depression or suicidal ideations.    PHYSICAL EXAM:  Vitals:    07/06/22 1236   BP: (!) 144/79   Pulse:    Resp:    Temp:      CONSTITUTIONAL: Alert, oriented, appears in no acute distress.  HEENT: Head is normocephalic, atraumatic. Sclerae white, conjunctivae pink. Bilateral external ears normal. Hearing grossly intact. Nasal mucosa pink, no sinus drainage.   NECK: Supple. Trachea midline.  RESPIRATORY:Normal respiratory effort.   CARDIOVASCULAR:  No edema noted.   ABDOMEN: Soft. Non-distended.There is no tenderness to palpation.   Genitourinary: Performed by Dr Carson.   Skin: No abnormal skin lesions noted.   Muscoluskeletal: No clubbing, no cyanosis, no edema.  Neurological: Cranial nerves II through XII grossly intact, moves all extremities.  Psychicatric: Oriented to person, place and time. Mood indicates no abnormalities. Doesn't appear to be depressed or agitated.       Vital Signs  Vital Last Value 24 Hour Range   Temperature 97.8 °F (36.6 °C) Temp  Min: 97.8 °F (36.6 °C)  Max: 97.9 °F (36.6 °C)   Pulse 92 Pulse  Min: 78  Max: 92   Respiratory (!) 22 Resp  Min: 16  Max: 22   Blood Pressure (!) 144/79 BP  Min: 134/71  Max: 146/79   Pulse Oximetry 99 % SpO2  Min: 98 %  Max: 100 %         Diagnostics:  CT ABDOMEN  PELVIS FOR KIDNEY STONES    Result Date: 7/6/2022  CT ABDOMEN AND PELVIS WITHOUT CONTRAST (RENAL STONE PROTOCOL) CLINICAL INFORMATION:  Flank pain stone known or suspected. COMPARISON:  No prior CT comparison. TECHNIQUE:  Using a multidetector, multislice helical CT imaging system, CT of the abdomen and pelvis is performed without contrast, using renal stone protocol. Coronal and sagittal reformats. FINDINGS:  CT Abdomen:  Lung bases are clear is no evidence of any free intraperitoneal air Liver spleen and pancreas appears unremarkable. Some punctate nonobstructive calculus is seen in the lower pole of the left kidney. Right kidney appears unremarkable. Appendix is identified and appears normal. CT Pelvis:  There is a 2.8 mm calculus in the distal left ureter close to the left ureteral vesicle junction. Otherwise scanning through the pelvis appears unremarkable     IMPRESSION:  2.8 mm calculus in the distal left ureter not causing any significant hydronephrosis as outlined above 2. Other findings as outlined above       LABORATORY DATA:     WBC (K/mcL)   Date Value   07/06/2022 11.7 (H)     RBC (mil/mcL)   Date Value   07/06/2022 5.47     HCT (%)   Date Value   07/06/2022 46.5     HGB (g/dL)   Date Value   07/06/2022 16.1     PLT (K/mcL)   Date Value   07/06/2022 273     LASTLABX(sodium)@  Potassium (mmol/L)   Date Value   07/06/2022 4.4     Chloride (mmol/L)   Date Value   07/06/2022 103     Glucose (mg/dL)   Date Value   07/06/2022 134 (H)     Calcium (mg/dL)   Date Value   07/06/2022 10.2     Carbon Dioxide (mmol/L)   Date Value   07/06/2022 23     BUN (mg/dL)   Date Value   07/06/2022 13     Creatinine (mg/dL)   Date Value   07/06/2022 0.90       Assessment/Plan:    Facundo Singh is a 20 y.o. male who presented with left flank pain with left ureteral stone identified on CT. He has a urological history significant for chordee and hypospadias for which elected not to undergo surgical repair as an infant.    CT  showed a 2.8mm calculus in the distal left ureter not causing any significant hydronephrosis. A punctate nonobstructive calculus is seen in the lower pole of the left kidney.    UA with moderate blood, no signs of infection. Afebrile, WBC 11.7. Creatinine 0.90.    After discussing in great detail treatment options of kidney stones with patient and family it was determined that given patient complex history and low pain threshold will proceed with left ureteroscopy, laser, ureteral stent insertion today. Will plan for discharge post procedure. The patient mother and father - legal guardian - understand and are in agreement with this plan.    I had a brief counseling session with the patient concerning the diagnosis and treatment options.     I performed a brief review of the patient's medical records.     The total time spent in review of films, chart review, history and physical examination, discussion with care team and referring provider, and discussion with attending urologist totaled 30 minutes.     PAVAN Hernandez.    Urology Attending    I have independently seen and evaluated the patient and have discussed the case and formulated the plan with PAVAN Hernandez.    Briefly,     20-year-old male with history of developmental delay secondary to mitochondrial disorder presenting with left-sided flank pain found to have a 2.5-3 mm left distal ureteral calculus as well as a 1.5 mm left lower pole calculus    Assessment/Plan - given the complexity of the patient's medical and social history, I have offered immediate ureteroscopic extraction of the left distal stone with a possible urethral dilation.  Will add for later today.    Addendum:  After prolonged discussion with anesthesia as well as the patient's caretakers, it has been decided that avoidance of anesthesia without appropriate preoperative clearance due to the multiple risk factors associated with his mitochondrial disorder would be best.  Will  reach out to his providers at [facility] to see if surgery can be electively scheduled after appropriate preoperative clearance.  Patient remains free of any signs of infection, acute kidney injury, or uncontrolled pain.  An additional hour of provider time was used in these discussions.    The total time spent in both the evaluation and coordination of care by both myself for 40 minutes and PAVAN Hernandez for 30 minutes totaling 70  minutes       189.5

## 2022-08-05 ENCOUNTER — EMERGENCY (EMERGENCY)
Facility: HOSPITAL | Age: 47
LOS: 1 days | Discharge: HOME | End: 2022-08-05
Attending: EMERGENCY MEDICINE | Admitting: EMERGENCY MEDICINE

## 2022-08-05 VITALS
SYSTOLIC BLOOD PRESSURE: 134 MMHG | TEMPERATURE: 98 F | WEIGHT: 190.04 LBS | DIASTOLIC BLOOD PRESSURE: 88 MMHG | HEIGHT: 74 IN | RESPIRATION RATE: 18 BRPM | HEART RATE: 87 BPM | OXYGEN SATURATION: 99 %

## 2022-08-05 VITALS
OXYGEN SATURATION: 97 % | DIASTOLIC BLOOD PRESSURE: 96 MMHG | SYSTOLIC BLOOD PRESSURE: 144 MMHG | HEART RATE: 77 BPM | TEMPERATURE: 98 F | RESPIRATION RATE: 18 BRPM

## 2022-08-05 DIAGNOSIS — Z98.890 OTHER SPECIFIED POSTPROCEDURAL STATES: Chronic | ICD-10-CM

## 2022-08-05 LAB
ALBUMIN SERPL ELPH-MCNC: 3.9 G/DL — SIGNIFICANT CHANGE UP (ref 3.4–5)
ALP SERPL-CCNC: 81 U/L — SIGNIFICANT CHANGE UP (ref 40–120)
ALT FLD-CCNC: 15 U/L — SIGNIFICANT CHANGE UP (ref 12–42)
ANION GAP SERPL CALC-SCNC: 7 MMOL/L — LOW (ref 9–16)
APTT BLD: 30.7 SEC — SIGNIFICANT CHANGE UP (ref 27.5–35.5)
AST SERPL-CCNC: 13 U/L — LOW (ref 15–37)
BASOPHILS # BLD AUTO: 0.03 K/UL — SIGNIFICANT CHANGE UP (ref 0–0.2)
BASOPHILS NFR BLD AUTO: 0.5 % — SIGNIFICANT CHANGE UP (ref 0–2)
BILIRUB SERPL-MCNC: 0.2 MG/DL — SIGNIFICANT CHANGE UP (ref 0.2–1.2)
BUN SERPL-MCNC: 16 MG/DL — SIGNIFICANT CHANGE UP (ref 7–23)
CALCIUM SERPL-MCNC: 9.1 MG/DL — SIGNIFICANT CHANGE UP (ref 8.5–10.5)
CHLORIDE SERPL-SCNC: 105 MMOL/L — SIGNIFICANT CHANGE UP (ref 96–108)
CO2 SERPL-SCNC: 26 MMOL/L — SIGNIFICANT CHANGE UP (ref 22–31)
CREAT SERPL-MCNC: 1.09 MG/DL — SIGNIFICANT CHANGE UP (ref 0.5–1.3)
EGFR: 84 ML/MIN/1.73M2 — SIGNIFICANT CHANGE UP
EOSINOPHIL # BLD AUTO: 0.42 K/UL — SIGNIFICANT CHANGE UP (ref 0–0.5)
EOSINOPHIL NFR BLD AUTO: 6.3 % — HIGH (ref 0–6)
GLUCOSE SERPL-MCNC: 104 MG/DL — HIGH (ref 70–99)
HCT VFR BLD CALC: 44.5 % — SIGNIFICANT CHANGE UP (ref 39–50)
HGB BLD-MCNC: 14.9 G/DL — SIGNIFICANT CHANGE UP (ref 13–17)
IMM GRANULOCYTES NFR BLD AUTO: 0.3 % — SIGNIFICANT CHANGE UP (ref 0–1.5)
INR BLD: 1.13 — SIGNIFICANT CHANGE UP (ref 0.88–1.16)
LIDOCAIN IGE QN: 202 U/L — SIGNIFICANT CHANGE UP (ref 73–393)
LYMPHOCYTES # BLD AUTO: 2.83 K/UL — SIGNIFICANT CHANGE UP (ref 1–3.3)
LYMPHOCYTES # BLD AUTO: 42.7 % — SIGNIFICANT CHANGE UP (ref 13–44)
MCHC RBC-ENTMCNC: 29.9 PG — SIGNIFICANT CHANGE UP (ref 27–34)
MCHC RBC-ENTMCNC: 33.5 GM/DL — SIGNIFICANT CHANGE UP (ref 32–36)
MCV RBC AUTO: 89.4 FL — SIGNIFICANT CHANGE UP (ref 80–100)
MONOCYTES # BLD AUTO: 0.67 K/UL — SIGNIFICANT CHANGE UP (ref 0–0.9)
MONOCYTES NFR BLD AUTO: 10.1 % — SIGNIFICANT CHANGE UP (ref 2–14)
NEUTROPHILS # BLD AUTO: 2.66 K/UL — SIGNIFICANT CHANGE UP (ref 1.8–7.4)
NEUTROPHILS NFR BLD AUTO: 40.1 % — LOW (ref 43–77)
NRBC # BLD: 0 /100 WBCS — SIGNIFICANT CHANGE UP (ref 0–0)
PLATELET # BLD AUTO: 308 K/UL — SIGNIFICANT CHANGE UP (ref 150–400)
POTASSIUM SERPL-MCNC: 4.3 MMOL/L — SIGNIFICANT CHANGE UP (ref 3.5–5.3)
POTASSIUM SERPL-SCNC: 4.3 MMOL/L — SIGNIFICANT CHANGE UP (ref 3.5–5.3)
PROT SERPL-MCNC: 7.5 G/DL — SIGNIFICANT CHANGE UP (ref 6.4–8.2)
PROTHROM AB SERPL-ACNC: 13.1 SEC — SIGNIFICANT CHANGE UP (ref 10.5–13.4)
RBC # BLD: 4.98 M/UL — SIGNIFICANT CHANGE UP (ref 4.2–5.8)
RBC # FLD: 13.5 % — SIGNIFICANT CHANGE UP (ref 10.3–14.5)
SARS-COV-2 RNA SPEC QL NAA+PROBE: SIGNIFICANT CHANGE UP
SODIUM SERPL-SCNC: 138 MMOL/L — SIGNIFICANT CHANGE UP (ref 132–145)
WBC # BLD: 6.63 K/UL — SIGNIFICANT CHANGE UP (ref 3.8–10.5)
WBC # FLD AUTO: 6.63 K/UL — SIGNIFICANT CHANGE UP (ref 3.8–10.5)

## 2022-08-05 PROCEDURE — 74176 CT ABD & PELVIS W/O CONTRAST: CPT | Mod: 26

## 2022-08-05 PROCEDURE — 76705 ECHO EXAM OF ABDOMEN: CPT | Mod: 26

## 2022-08-05 PROCEDURE — 99285 EMERGENCY DEPT VISIT HI MDM: CPT

## 2022-08-05 PROCEDURE — 71046 X-RAY EXAM CHEST 2 VIEWS: CPT | Mod: 26

## 2022-08-05 NOTE — ED PROVIDER NOTE - PROGRESS NOTE DETAILS
signed out to me by dr newberry pending CT results, no acute pathology on CT, no kidney stone. likely pain is MSK, ok to take OTC meds, outpatient PMD followup.

## 2022-08-05 NOTE — ED ADULT TRIAGE NOTE - CHIEF COMPLAINT QUOTE
Pt complaining of right sided rib pain x 3 weeks. Pt denies any recent injury. Pt states that he had a mass removed through that area in 2020.

## 2022-08-05 NOTE — ED PROVIDER NOTE - CLINICAL SUMMARY MEDICAL DECISION MAKING FREE TEXT BOX
here with intermittent right sided abdominal/ chest pain x a few weeks. no specific trigger. location of pain concerning for biliary vs lower lung pathology. labs/ct/us ordered. cxr clear. labs with normal wbc, lft, lipase. us with questionable dilated cbd which appears old per radiology and possible right renal stone. ct recommended, ordered. here with intermittent right sided abdominal/ chest pain x a few weeks. no specific trigger. location of pain concerning for biliary vs lower lung pathology. labs/ct/us ordered. cxr clear. labs with normal wbc, lft, lipase. us with questionable dilated cbd which appears old per radiology and possible right renal stone. ct recommended, ordered. signed out to Dr. Bee pending imaging and reassessment

## 2022-08-05 NOTE — ED PROVIDER NOTE - OBJECTIVE STATEMENT
current smoker, with PMHx of PUD, mediastinal thyroid mass resected via danilo-sternotomy and R VATS 2020, here with right sided chest/ upper abdominal pain. Intermittent x 3 weeks. Happens about every other day. Described as feeling like being punched inside. Lasts about 5 min each time. No nausea/vomiting, diarrhea, fever, chills, cough, sob. Currently not present. Concerned because he feels it on same side as prior surgery. No specific trigger, not related to eating or breathing.

## 2022-08-05 NOTE — ED PROVIDER NOTE - MUSCULOSKELETAL, MLM
Spine appears normal, range of motion is not limited, no muscle or joint tenderness. well healed small incisions right chest wall.

## 2022-08-05 NOTE — ED PROVIDER NOTE - NSFOLLOWUPINSTRUCTIONS_ED_ALL_ED_FT
Please see your primary care provider for followup.  Call for appointment.  If you have any problems with followup, please call the ED Referral Coordinator at [phone].  Return to the ER if symptoms worsen or other concerns.    Acute Abdominal Pain    WHAT YOU NEED TO KNOW:    The cause of your abdominal pain may not be found. If a cause is found, treatment will depend on what the cause is.     DISCHARGE INSTRUCTIONS:    Return to the emergency department if:     You vomit blood or cannot stop vomiting.      You have blood in your bowel movement or it looks like tar.       You have bleeding from your rectum.       Your abdomen is larger than usual, more painful, and hard.       You have severe pain in your abdomen.       You stop passing gas and having bowel movements.       You feel weak, dizzy, or faint.    Contact your healthcare provider if:     You have a fever.      You have new signs and symptoms.      Your symptoms do not get better with treatment.       You have questions or concerns about your condition or care.    Medicines may be given to decrease pain, treat an infection, and manage your symptoms. Take your medicine as directed. Call your healthcare provider if you think your medicine is not helping or if you have side effects. Tell him if you are allergic to any medicine. Keep a list of the medicines, vitamins, and herbs you take. Include the amounts, and when and why you take them. Bring the list or the pill bottles to follow-up visits. Carry your medicine list with you in case of an emergency.    Manage your symptoms:     Apply heat on your abdomen for 20 to 30 minutes every 2 hours for as many days as directed. Heat helps decrease pain and muscle spasms.       Manage your stress. Stress may cause abdominal pain. Your healthcare provider may recommend relaxation techniques and deep breathing exercises to help decrease your stress. Your healthcare provider may recommend you talk to someone about your stress or anxiety, such as a counselor or a trusted friend. Get plenty of sleep and exercise regularly.       Limit or do not drink alcohol. Alcohol can make your abdominal pain worse. Ask your healthcare provider if it is safe for you to drink alcohol. Also ask how much is safe for you to drink.       Do not smoke. Nicotine and other chemicals in cigarettes can damage your esophagus and stomach. Ask your healthcare provider for information if you currently smoke and need help to quit. E-cigarettes or smokeless tobacco still contain nicotine. Talk to your healthcare provider before you use these products.     Make changes to the food you eat as directed: Do not eat foods that cause abdominal pain or other symptoms. Eat small meals more often.     Eat more high-fiber foods if you are constipated. High-fiber foods include fruits, vegetables, whole-grain foods, and legumes.       Do not eat foods that cause gas if you have bloating. Examples include broccoli, cabbage, and cauliflower. Do not drink soda or carbonated drinks, because these may also cause gas.       Do not eat foods or drinks that contain sorbitol or fructose if you have diarrhea and bloating. Some examples are fruit juices, candy, jelly, and sugar-free gum.       Do not eat high-fat foods, such as fried foods, cheeseburgers, hot dogs, and desserts.      Limit or do not drink caffeine. Caffeine may make symptoms, such as heart burn or nausea, worse.       Drink plenty of liquids to prevent dehydration from diarrhea or vomiting. Ask your healthcare provider how much liquid to drink each day and which liquids are best for you.     Follow up with your healthcare provider as directed: Write down your questions so you remember to ask them during your visits.        SEEK IMMEDIATE MEDICAL CARE IF YOU HAVE ANY OF THE FOLLOWING SYMPTOMS: worsening abdominal pain, uncontrollable vomiting, profuse diarrhea, inability to have bowel movements or pass gas, black or bloody stools, fever accompanying chest pain or back pain, or fainting. These symptoms may represent a serious problem that is an emergency. Do not wait to see if the symptoms will go away. Get medical help right away. Call 911 and do not drive yourself to the hospital.

## 2022-08-05 NOTE — ED ADULT TRIAGE NOTE - SOURCE OF INFORMATION
Patient Principal Discharge DX:	Depression, unspecified depression type  Secondary Diagnosis:	Suicidal ideation

## 2022-08-05 NOTE — ED PROVIDER NOTE - PATIENT PORTAL LINK FT
You can access the FollowMyHealth Patient Portal offered by [facility] by registering at the following website: [email or web address]. By joining [facility]’s FollowMyHealth portal, you will also be able to view your health information using other applications (apps) compatible with our system.

## 2022-08-05 NOTE — ED ADULT NURSE NOTE - OBJECTIVE STATEMENT
pt presents to ED complaining of L sided rib pain for three weeks. Denies LOC CP. Denies indigestion, vomiting diarrhea or nausea.

## 2022-08-08 DIAGNOSIS — Z20.822 CONTACT WITH AND (SUSPECTED) EXPOSURE TO COVID-19: ICD-10-CM

## 2022-08-08 DIAGNOSIS — Z91.013 ALLERGY TO SEAFOOD: ICD-10-CM

## 2022-08-08 DIAGNOSIS — R10.11 RIGHT UPPER QUADRANT PAIN: ICD-10-CM

## 2022-08-08 DIAGNOSIS — K27.9 PEPTIC ULCER, SITE UNSPECIFIED, UNSPECIFIED AS ACUTE OR CHRONIC, WITHOUT HEMORRHAGE OR PERFORATION: ICD-10-CM

## 2022-08-08 DIAGNOSIS — F17.200 NICOTINE DEPENDENCE, UNSPECIFIED, UNCOMPLICATED: ICD-10-CM

## 2022-08-08 DIAGNOSIS — E04.1 NONTOXIC SINGLE THYROID NODULE: ICD-10-CM

## 2022-08-08 DIAGNOSIS — R07.89 OTHER CHEST PAIN: ICD-10-CM

## 2022-08-19 ENCOUNTER — APPOINTMENT (OUTPATIENT)
Dept: OTOLARYNGOLOGY | Facility: CLINIC | Age: 47
End: 2022-08-19

## 2022-08-19 VITALS
SYSTOLIC BLOOD PRESSURE: 122 MMHG | BODY MASS INDEX: 24.38 KG/M2 | WEIGHT: 190 LBS | DIASTOLIC BLOOD PRESSURE: 87 MMHG | TEMPERATURE: 98.4 F | HEART RATE: 92 BPM | HEIGHT: 74 IN

## 2022-08-19 PROCEDURE — 99214 OFFICE O/P EST MOD 30 MIN: CPT | Mod: 25

## 2022-08-19 PROCEDURE — 31575 DIAGNOSTIC LARYNGOSCOPY: CPT

## 2022-08-19 NOTE — HISTORY OF PRESENT ILLNESS
[de-identified] : 46y M previously been a patient of Dr. Villavicencio with s/p 8/25/20 right hemithyroidectomy via neck + right thorax (robotic) + sternotomy (Inra), final path thyroid with adenomatoid nodules and benign lymph nodes. He reports voice weakness, especially after speaking for 10 minutes. Presents with right sided neck pain and occasional heartburn. Pt had held off with follow up secondary to covid. Symptoms have worsened a bit as well with regards to pain on the right and voice weakness.

## 2022-08-19 NOTE — PROCEDURE
[Hoarseness] : hoarseness not clearly evaluated by indirect laryngoscopy [Topical Lidocaine] : topical lidocaine [Oxymetazoline HCl] : oxymetazoline HCl [Flexible Endoscope] : examined with the flexible endoscope [Normal] : the false vocal folds were pink and regular, the ventricular sulcus was open, the true vocal folds were glistening white, tense and of equal length, mobility, and height [True Vocal Cords Erythematous] : bilateral true vocal cord edema [Arytenoid Erythema ___ /4] : arytenoid erythema [unfilled]U/4 [Interarytenoid Edema] : interarytenoid area edematous [de-identified] : laryngeal edema, mild right tvc paresis

## 2022-08-19 NOTE — PHYSICAL EXAM
[de-identified] : incision clear, right sternal tenderness  [Normal] : mucosa is normal [Midline] : trachea located in midline position [de-identified] : right tonsillar asymmetry

## 2022-08-19 NOTE — REASON FOR VISIT
[Initial Evaluation] : an initial evaluation for [Throat Pain] : throat pain [FreeTextEntry2] : neck pain

## 2022-08-30 ENCOUNTER — APPOINTMENT (OUTPATIENT)
Dept: CT IMAGING | Facility: CLINIC | Age: 47
End: 2022-08-30

## 2022-08-30 ENCOUNTER — OUTPATIENT (OUTPATIENT)
Dept: OUTPATIENT SERVICES | Facility: HOSPITAL | Age: 47
LOS: 1 days | End: 2022-08-30

## 2022-08-30 DIAGNOSIS — Z98.890 OTHER SPECIFIED POSTPROCEDURAL STATES: Chronic | ICD-10-CM

## 2022-09-22 ENCOUNTER — APPOINTMENT (OUTPATIENT)
Dept: THORACIC SURGERY | Facility: CLINIC | Age: 47
End: 2022-09-22

## 2022-09-22 VITALS
HEIGHT: 74 IN | RESPIRATION RATE: 18 BRPM | OXYGEN SATURATION: 95 % | HEART RATE: 84 BPM | BODY MASS INDEX: 24.38 KG/M2 | SYSTOLIC BLOOD PRESSURE: 143 MMHG | DIASTOLIC BLOOD PRESSURE: 93 MMHG | WEIGHT: 190 LBS | TEMPERATURE: 98.2 F

## 2022-09-22 DIAGNOSIS — R07.9 CHEST PAIN, UNSPECIFIED: ICD-10-CM

## 2022-09-22 DIAGNOSIS — M54.2 CERVICALGIA: ICD-10-CM

## 2022-09-22 PROCEDURE — 99213 OFFICE O/P EST LOW 20 MIN: CPT

## 2022-09-23 ENCOUNTER — APPOINTMENT (OUTPATIENT)
Dept: OTOLARYNGOLOGY | Facility: CLINIC | Age: 47
End: 2022-09-23

## 2022-09-23 VITALS
WEIGHT: 190 LBS | TEMPERATURE: 97.4 F | BODY MASS INDEX: 24.38 KG/M2 | HEART RATE: 82 BPM | HEIGHT: 74 IN | DIASTOLIC BLOOD PRESSURE: 91 MMHG | SYSTOLIC BLOOD PRESSURE: 133 MMHG

## 2022-09-23 PROBLEM — R07.9 RIGHT-SIDED CHEST PAIN: Status: ACTIVE | Noted: 2022-09-23

## 2022-09-23 PROBLEM — M54.2 NECK PAIN ON RIGHT SIDE: Status: ACTIVE | Noted: 2022-08-19

## 2022-09-23 PROCEDURE — 99214 OFFICE O/P EST MOD 30 MIN: CPT | Mod: 25

## 2022-09-23 PROCEDURE — 31575 DIAGNOSTIC LARYNGOSCOPY: CPT

## 2022-09-23 NOTE — ASSESSMENT
[FreeTextEntry1] : Patient is known to me. I assisted Dr. Villavicencio in removal of an intrathoracic goiter by a right VATS robotic assisted approach and hemisternotomy on 8/25/2020. The patient has done well since surgery. He returns to clinic today because of some right sided upper abdominal pain. \par \par Patient reports the pain is intermittent, sharp and feels like a spasm. It started 2 months ago It is located near the right costal margin. He does not take anything that makes it better but it resolves without medication. His incisions were examined and they are well healed. They are nontender. \par \par I informed the patient that this may be secondary to muscle spasms. I will refer him to Dr. Baron for pain management. \par \par Additionally, the patient reports symptoms of reflux. Dr. Gray has started him on a PPI and he will continue this as his reflux is well controlled and he does not mind taking the medication. Discussed antireflux procedures with the patient. He will continue medical management. \par \par Patient understood all that was discussed and agreed with the plan. \par \par PLAN\par 1.  Referral to Dr. Baron for pain management\par 2.  Return as needed

## 2022-09-23 NOTE — HISTORY OF PRESENT ILLNESS
[FreeTextEntry1] : Pt doing well on reflux meds with improvement in voice and irritation. Pt overall feel better. Ct was reviewed and shows slightly larger left lobe and nodule along with findings consistent with vocal cord paralysis

## 2022-09-23 NOTE — HISTORY OF PRESENT ILLNESS
[FreeTextEntry1] : 46y M previously been a patient of Dr. Villavicencio with s/p 8/25/20 right hemithyroidectomy via neck + right thorax (robotic) + sternotomy (Inra), final path thyroid with adenomatoid nodules and benign lymph nodes. He reports voice weakness, especially after speaking for 10 minutes. Presents with right sided neck pain and occasional heartburn. Patient underwent CT imaging and presents today for further evaluation. \par \par CT neck soft tissue IC/chest IC completed on 08/30/22:\par -status post right hemithyroidectomy since August 2020. Findings consistent with right vocal palsy, without causative mass identified\par -residual left hemithyroid is slightly larger than before and now shows a 1.3cm nodule. Further characterization with thyroid ultrasound is suggested\par -incidental note of preepiglottic thyroglossal duct cyst \par -centrilobular and paraseptal emphysema\par -no mediastinal or hilar adenopathy\par -2 solid nodules right upper lobe-unchanged \par \par \par \par

## 2022-09-23 NOTE — PHYSICAL EXAM
[Neck Appearance] : the appearance of the neck was normal [Neck Cervical Mass (___cm)] : no neck mass was observed [Jugular Venous Distention Increased] : there was no jugular-venous distention [Thyroid Diffuse Enlargement] : the thyroid was not enlarged [Thyroid Nodule] : there were no palpable thyroid nodules [] : no respiratory distress [Auscultation Breath Sounds / Voice Sounds] : lungs were clear to auscultation bilaterally [Examination Of The Chest] : the chest was normal in appearance [Chest Visual Inspection Thoracic Asymmetry] : no chest asymmetry [Diminished Respiratory Excursion] : normal chest expansion

## 2022-09-23 NOTE — PHYSICAL EXAM
[de-identified] : incision clear, right sternal tenderness improved  [Normal] : mucosa is normal [Midline] : trachea located in midline position [de-identified] : right tonsillar asymmetry

## 2022-09-27 ENCOUNTER — OUTPATIENT (OUTPATIENT)
Dept: OUTPATIENT SERVICES | Facility: HOSPITAL | Age: 47
LOS: 1 days | End: 2022-09-27
Payer: COMMERCIAL

## 2022-09-27 ENCOUNTER — APPOINTMENT (OUTPATIENT)
Dept: ULTRASOUND IMAGING | Facility: HOSPITAL | Age: 47
End: 2022-09-27

## 2022-09-27 DIAGNOSIS — Z98.890 OTHER SPECIFIED POSTPROCEDURAL STATES: Chronic | ICD-10-CM

## 2022-09-27 PROCEDURE — 76536 US EXAM OF HEAD AND NECK: CPT | Mod: 26

## 2022-09-27 PROCEDURE — 76536 US EXAM OF HEAD AND NECK: CPT

## 2022-09-28 LAB
T4 FREE SERPL-MCNC: 1.3 NG/DL
THYROGLOB AB SERPL-ACNC: <20 IU/ML
THYROPEROXIDASE AB SERPL IA-ACNC: <10 IU/ML
TSH SERPL-ACNC: 2.71 UIU/ML

## 2022-10-14 ENCOUNTER — RESULT REVIEW (OUTPATIENT)
Age: 47
End: 2022-10-14

## 2022-10-14 ENCOUNTER — APPOINTMENT (OUTPATIENT)
Dept: OTOLARYNGOLOGY | Facility: CLINIC | Age: 47
End: 2022-10-14

## 2022-10-14 VITALS
TEMPERATURE: 97.8 F | DIASTOLIC BLOOD PRESSURE: 95 MMHG | HEART RATE: 81 BPM | SYSTOLIC BLOOD PRESSURE: 141 MMHG | WEIGHT: 190 LBS | BODY MASS INDEX: 24.38 KG/M2 | HEIGHT: 74 IN

## 2022-10-14 PROCEDURE — 31575 DIAGNOSTIC LARYNGOSCOPY: CPT

## 2022-10-14 PROCEDURE — 99214 OFFICE O/P EST MOD 30 MIN: CPT | Mod: 25

## 2022-10-14 NOTE — DATA REVIEWED
[de-identified] : reviewed 10/14\par PROCEDURE DATE:  09/27/2022\par \par \par \par INTERPRETATION:  THYROID ULTRASOUND with Doppler dated 9/27/2022 6:26 PM\par \par History: s/p right thyroidectomy with left enlargement and nodule;\par \par Technique: Ultrasound evaluation of the thyroid was performed in the axial and sagittal projections. Color Doppler evaluation was also performed.\par \par Prior study: CT scans of the neck and chest from 8/30/2022. Thyroid ultrasound exam from 8/3/2020. Additional prior imaging studies dating back to 8/29/2012.\par \par Findings:\par \par RIGHT LOBE: Right hemithyroidectomy. No mass in thyroid bed.\par \par LEFT LOBE:\par Dimensions: 6.0 x 2.5 x 3.1 cm (sagittal x AP x transverse). Measured 5.3 x 2.5 x 2.4 cm on 8/3/2020.\par Echotexture: Homogeneous\par Vascularity: Normal\par The left lobe contains two nodules.\par \par Nodule 1:\par Location: Midpole\par Dimensions: 1.3 x 1.2 x 1.4 cm (sagittal x AP x transverse). This is a new nodule.\par Composition: Echogenic solid component with small amount of peripheral fluid.\par For solid nodules or for the solid portion of a partially cystic nodule, does the solid portion have any of the following suspicious features?\par -  No: Hypoechoic\par -  No: Microcalcifications\par -  No: Irregular margin (infiltrative or microlobulated)\par -  No: Taller than wide (AP dimension > transverse)\par -  No: Extrathyroidal extension\par -  No: Interrupted rim calcification with soft tissue extrusion\par \par Nodule 2:\par Location: Lower pole\par Dimensions: 1.5 x 1.3 x 1.3 cm (sagittal x AP x transverse). Previously measured 1.0 x 1.0 x 0.8 cm.\par Composition: Echogenic solid nodule with coarse calcification.\par For solid nodules or for the solid portion of a partially cystic nodule, does the solid portion have any of the following suspicious features?\par -  No: Hypoechoic\par -  No: Microcalcifications\par -  No: Irregular margin (infiltrative or microlobulated)\par -  No: Taller than wide (AP dimension > transverse)\par -  No: Extrathyroidal extension\par -  No: Interrupted rim calcification with soft tissue extrusion\par \par ISTHMUS:\par Dimensions: 0.4 cm AP\par The isthmus lobe contains no visible nodules.\par \par Cervical Lymph Node Evaluation: No abnormal lymph nodes are identified in the neck.\par \par Parathyroid Examination: Parathyroid glands not visualized.\par \par Thyroglossal duct cyst again noted, measuring 3.0 x 1.6 x 2.0 cm.\par \par \par IMPRESSION:\par 1. Since 8/3/2020, there has been an increase in the size of the left lobe of thyroid.\par \par 2. There is one new nodule in the left lobe of the thyroid and there has been an increase in the size of a second nodule. Recommend biopsy of these nodules.\par \par

## 2022-10-14 NOTE — HISTORY OF PRESENT ILLNESS
[de-identified] : 46 y M returns with Hoarseness, neck pain and throat pain is here today for labs and US Thyroid \par results review that show TSH 2.71, T4 1.3 and nodular goiter. VOice is improving on meds. Pt has longer talk time.

## 2022-10-14 NOTE — PHYSICAL EXAM
[de-identified] : incision clear, right sternal tenderness improved  [Normal] : mucosa is normal [Midline] : trachea located in midline position

## 2022-10-14 NOTE — PROCEDURE
[None] : none [Flexible Endoscope] : examined with the flexible endoscope [True Vocal Cords Unilateral Paralysis] : true vocal cord paralysis on the right [Glottis Arytenoid Cartilages Erythema] : bilateral arytenoid ~M erythema [Normal] : posterior cricoid area had healthy pink mucosa in the interarytenoid area and the esophageal inlet

## 2022-10-19 NOTE — ED PROVIDER NOTE - CLINICAL SUMMARY MEDICAL DECISION MAKING FREE TEXT BOX
n/a Workup reveals a mediastinal mass. Case was discussed with Dr. Rey (Thoracic Surgery) who recommends admission to Medicine service for further workup and he will be available for consult. Case endorsed to Dr. Hastings (Hospitalist) who has accepted the pt to [facility] Medicine under Dr. Briones.

## 2022-11-03 ENCOUNTER — OUTPATIENT (OUTPATIENT)
Dept: OUTPATIENT SERVICES | Facility: HOSPITAL | Age: 47
LOS: 1 days | End: 2022-11-03

## 2022-11-03 ENCOUNTER — APPOINTMENT (OUTPATIENT)
Dept: ULTRASOUND IMAGING | Facility: CLINIC | Age: 47
End: 2022-11-03

## 2022-11-03 ENCOUNTER — RESULT REVIEW (OUTPATIENT)
Age: 47
End: 2022-11-03

## 2022-11-03 DIAGNOSIS — Z98.890 OTHER SPECIFIED POSTPROCEDURAL STATES: Chronic | ICD-10-CM

## 2022-11-03 PROCEDURE — 10005 FNA BX W/US GDN 1ST LES: CPT

## 2022-11-03 PROCEDURE — 88173 CYTOPATH EVAL FNA REPORT: CPT | Mod: 26

## 2022-11-03 PROCEDURE — 88305 TISSUE EXAM BY PATHOLOGIST: CPT | Mod: 26

## 2022-11-03 PROCEDURE — 10006 FNA BX W/US GDN EA ADDL: CPT

## 2022-11-04 ENCOUNTER — RESULT REVIEW (OUTPATIENT)
Age: 47
End: 2022-11-04

## 2022-11-10 LAB
NON-GYNECOLOGICAL CYTOLOGY STUDY: SIGNIFICANT CHANGE UP
NON-GYNECOLOGICAL CYTOLOGY STUDY: SIGNIFICANT CHANGE UP

## 2022-12-16 ENCOUNTER — APPOINTMENT (OUTPATIENT)
Dept: OTOLARYNGOLOGY | Facility: CLINIC | Age: 47
End: 2022-12-16

## 2022-12-16 PROCEDURE — 99214 OFFICE O/P EST MOD 30 MIN: CPT

## 2022-12-16 NOTE — HISTORY OF PRESENT ILLNESS
[de-identified] : 46 y M returns with Hoarseness, neck pain and throat pain is here today for labs and US Thyroid \par results review that show TSH 2.71, T4 1.3 and nodular goiter. VOice is improving on meds. Pt has longer talk time. \par  - [FreeTextEntry1] : Patient presents today to review Thyroid FNA results. He continues to report voice hoarseness that occurs intermittently.  Denies dysphagia. No breathing  issues. No fevers, night sweats or weight loss. 1 pack cigarettes every other day

## 2022-12-16 NOTE — DATA REVIEWED
[de-identified] : Thyroid FNA left lower pole 1.7cm nodule: Pearl II, consistent with nodular goiter \par Left mid thyroid nodule Pearl III and intermediate 50% probability of cancer

## 2022-12-19 LAB
T4 FREE SERPL-MCNC: 1.1 NG/DL
THYROGLOB AB SERPL-ACNC: <20 IU/ML
THYROPEROXIDASE AB SERPL IA-ACNC: <10 IU/ML
TSH SERPL-ACNC: 2.39 UIU/ML

## 2023-03-15 ENCOUNTER — APPOINTMENT (OUTPATIENT)
Dept: OTOLARYNGOLOGY | Facility: CLINIC | Age: 48
End: 2023-03-15
Payer: COMMERCIAL

## 2023-03-15 DIAGNOSIS — E89.0 POSTPROCEDURAL HYPOTHYROIDISM: ICD-10-CM

## 2023-03-15 DIAGNOSIS — E04.1 NONTOXIC SINGLE THYROID NODULE: ICD-10-CM

## 2023-03-15 DIAGNOSIS — E04.9 NONTOXIC GOITER, UNSPECIFIED: ICD-10-CM

## 2023-03-15 PROCEDURE — 31575 DIAGNOSTIC LARYNGOSCOPY: CPT

## 2023-03-15 PROCEDURE — 99215 OFFICE O/P EST HI 40 MIN: CPT | Mod: 25

## 2023-03-15 NOTE — PROCEDURE
[Hoarseness] : hoarseness not clearly evaluated by indirect laryngoscopy [None] : none [Flexible Endoscope] : examined with the flexible endoscope [True Vocal Cords Unilateral Paralysis] : true vocal cord paralysis on the right [Normal] : posterior cricoid area had healthy pink mucosa in the interarytenoid area and the esophageal inlet

## 2023-03-15 NOTE — ASSESSMENT
[FreeTextEntry1] : I have discussed the clinical implications of my findings with  the patient. At this time the patient wishes to proceed with surgery-  left completion   thyroidectomy  .  The goals of the procedure, operative plan, alternatives including nonsurgical treatment and risks which include but are not limited to  anesthetic risk, bleeding, infection,  aesthetic deformity, numbness of skin,  chronic swallowing and voice problems, recurrent laryngeal nerve injury, superior laryngeal nerve injury, need for further surgery, need for further treatment, permanent hypoparathyroidism,  , chyle fistula were all explained to the patient who appears to understand  and wishes to proceed.    All questions are answered.  Accordingly they will be scheduled for left completion  thyroidectomy The patient will follow up with me sooner if any new, persistent or worsening symptoms.    \par \par

## 2023-03-15 NOTE — PHYSICAL EXAM
[Normal] : mucosa is normal [Midline] : trachea located in midline position [de-identified] : incision clear, left goiter

## 2023-03-15 NOTE — HISTORY OF PRESENT ILLNESS
[de-identified] : 46 y M returns with Hoarseness, neck pain and throat pain is here today for labs and US Thyroid \par results review that show TSH 2.71, T4 1.3 and nodular goiter. VOice is improving on meds. Pt has longer talk time. \par  - \par Interval History: Patient presents today to review Thyroid FNA results. He continues to report voice hoarseness that occurs intermittently. Denies dysphagia. No breathing issues. No fevers, night sweats or weight loss. 1 pack cigarettes every other day.  [FreeTextEntry1] : Pt presents today for follow up thyroid. He does still have some voice hoarseness and throat discomfort. Denies dysphagia. He did see endocrinologist who recommended thyroidectomy. Labs were WNL.

## 2023-05-11 ENCOUNTER — TRANSCRIPTION ENCOUNTER (OUTPATIENT)
Age: 48
End: 2023-05-11

## 2023-05-11 VITALS
WEIGHT: 190.7 LBS | RESPIRATION RATE: 16 BRPM | DIASTOLIC BLOOD PRESSURE: 93 MMHG | HEART RATE: 73 BPM | TEMPERATURE: 97 F | OXYGEN SATURATION: 98 % | SYSTOLIC BLOOD PRESSURE: 131 MMHG | HEIGHT: 74 IN

## 2023-05-11 NOTE — PRE-OP CHECKLIST - WAS PATIENT ON BETA BLOCKER?
No
Anxiety    Essential hypertension    Type 2 diabetes mellitus without complication, without long-term current use of insulin

## 2023-05-11 NOTE — ASU PATIENT PROFILE, ADULT - MEDICATIONS BROUGHT TO HOSPITAL, PROFILE
VIDEO VISIT PROGRESS NOTE      CHIEF COMPLAINT  Derm Problem      SUBJECTIVE  The patient is a 47 year old female who is requesting a Video Visit (V-Visit) for evaluation of Rash for over 10 days after doing yard work. Is still present and is weepy and itchy. She is not sure if it poison ivy or another plant.     MEDICATIONS  The medication list in the medical record as well as updates to the medication list submitted by the patient with this V-Visit were reviewed and updated today.      HISTORIES  I have personally reviewed and updated the following electronic medical record sections: Current medications, Allergies, Problem list, Past Medical History, Past Surgical History, Social History and Family History    REVIEW OF SYSTEMS  A complete 10 pt. Review of systems was done and was NEG except for what was mentioned in the HPI.       PHYSICAL EXAM  She is alert, nontoxic with fluent speech  Rash on left and right FA on underside is red and raised.     ASSESSMENT/PLAN  Irritant contact dermatitis due to other agents  Sayra was seen today for derm problem.    Diagnoses and all orders for this visit:    Irritant contact dermatitis due to other agents    Other orders  -     predniSONE (DELTASONE) 10 MG tablet; 40mg, 30mg, 30mg, 30mg. 4 days. #13          FOLLOW UP  Return if symptoms worsen or fail to improve.    CONSENT   This visit is being performed via video to discuss Derm Problem    Clinician Location: [facility]    Sayra is in [place] and her identity has been established.   She understands that we are limiting office visits due to the coronavirus pandemic and was informed that consent to treat includes permission to submit charges to her insurance. It was also shared that without being seen and evaluated in person, there is a risk that the information and/or assessment may be incomplete or inaccurate.  5-10 minutes were spent in this encounter.         
no
Pre-Excision Curettage Text (Leave Blank If You Do Not Want): Prior to drawing the surgical margin the visible lesion was removed with electrodesiccation and curettage to clearly define the lesion size.

## 2023-05-11 NOTE — ASU PATIENT PROFILE, ADULT - FALL HARM RISK - UNIVERSAL INTERVENTIONS
Bed in lowest position, wheels locked, appropriate side rails in place/Call bell, personal items and telephone in reach/Instruct patient to call for assistance before getting out of bed or chair/Non-slip footwear when patient is out of bed/[place] to call system/Physically safe environment - no spills, clutter or unnecessary equipment/Purposeful Proactive Rounding/Room/bathroom lighting operational, light cord in reach

## 2023-05-11 NOTE — ASU PATIENT PROFILE, ADULT - NSICDXPASTSURGICALHX_GEN_ALL_CORE_FT
PAST SURGICAL HISTORY:  H/O thyroidectomy right    Status post labral repair of shoulder (R) Shoulder

## 2023-05-12 ENCOUNTER — INPATIENT (INPATIENT)
Facility: HOSPITAL | Age: 48
LOS: 5 days | Discharge: HOME HEALTH | DRG: 3 | End: 2023-05-18
Attending: OTOLARYNGOLOGY | Admitting: OTOLARYNGOLOGY
Payer: COMMERCIAL

## 2023-05-12 ENCOUNTER — TRANSCRIPTION ENCOUNTER (OUTPATIENT)
Age: 48
End: 2023-05-12

## 2023-05-12 ENCOUNTER — RESULT REVIEW (OUTPATIENT)
Age: 48
End: 2023-05-12

## 2023-05-12 ENCOUNTER — NON-APPOINTMENT (OUTPATIENT)
Age: 48
End: 2023-05-12

## 2023-05-12 ENCOUNTER — APPOINTMENT (OUTPATIENT)
Dept: OTOLARYNGOLOGY | Facility: CLINIC | Age: 48
End: 2023-05-12
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
## 2023-05-12 DIAGNOSIS — E89.0 POSTPROCEDURAL HYPOTHYROIDISM: Chronic | ICD-10-CM

## 2023-05-12 DIAGNOSIS — Z98.890 OTHER SPECIFIED POSTPROCEDURAL STATES: Chronic | ICD-10-CM

## 2023-05-12 LAB
ANION GAP SERPL CALC-SCNC: 9 MMOL/L — SIGNIFICANT CHANGE UP (ref 5–17)
APTT BLD: 29.9 SEC — SIGNIFICANT CHANGE UP (ref 27.5–35.5)
BLD GP AB SCN SERPL QL: NEGATIVE — SIGNIFICANT CHANGE UP
BUN SERPL-MCNC: 14 MG/DL — SIGNIFICANT CHANGE UP (ref 7–23)
CALCIUM SERPL-MCNC: 8.1 MG/DL — LOW (ref 8.4–10.5)
CHLORIDE SERPL-SCNC: 108 MMOL/L — SIGNIFICANT CHANGE UP (ref 96–108)
CO2 SERPL-SCNC: 23 MMOL/L — SIGNIFICANT CHANGE UP (ref 22–31)
CREAT SERPL-MCNC: 1.17 MG/DL — SIGNIFICANT CHANGE UP (ref 0.5–1.3)
EGFR: 77 ML/MIN/1.73M2 — SIGNIFICANT CHANGE UP
GLUCOSE BLDC GLUCOMTR-MCNC: 119 MG/DL — HIGH (ref 70–99)
GLUCOSE BLDC GLUCOMTR-MCNC: 122 MG/DL — HIGH (ref 70–99)
GLUCOSE SERPL-MCNC: 114 MG/DL — HIGH (ref 70–99)
HCT VFR BLD CALC: 43.1 % — SIGNIFICANT CHANGE UP (ref 39–50)
HGB BLD-MCNC: 14.2 G/DL — SIGNIFICANT CHANGE UP (ref 13–17)
INR BLD: 1.13 — SIGNIFICANT CHANGE UP (ref 0.88–1.16)
MAGNESIUM SERPL-MCNC: 1.7 MG/DL — SIGNIFICANT CHANGE UP (ref 1.6–2.6)
MCHC RBC-ENTMCNC: 29.6 PG — SIGNIFICANT CHANGE UP (ref 27–34)
MCHC RBC-ENTMCNC: 32.9 GM/DL — SIGNIFICANT CHANGE UP (ref 32–36)
MCV RBC AUTO: 90 FL — SIGNIFICANT CHANGE UP (ref 80–100)
NRBC # BLD: 0 /100 WBCS — SIGNIFICANT CHANGE UP (ref 0–0)
PHOSPHATE SERPL-MCNC: 4 MG/DL — SIGNIFICANT CHANGE UP (ref 2.5–4.5)
PLATELET # BLD AUTO: 265 K/UL — SIGNIFICANT CHANGE UP (ref 150–400)
POTASSIUM SERPL-MCNC: 4.1 MMOL/L — SIGNIFICANT CHANGE UP (ref 3.5–5.3)
POTASSIUM SERPL-SCNC: 4.1 MMOL/L — SIGNIFICANT CHANGE UP (ref 3.5–5.3)
PROTHROM AB SERPL-ACNC: 13.5 SEC — HIGH (ref 10.5–13.4)
PTH-INTACT IO % DIF SERPL: 24.4 PG/ML — SIGNIFICANT CHANGE UP (ref 15–65)
RBC # BLD: 4.79 M/UL — SIGNIFICANT CHANGE UP (ref 4.2–5.8)
RBC # FLD: 13.8 % — SIGNIFICANT CHANGE UP (ref 10.3–14.5)
RH IG SCN BLD-IMP: POSITIVE — SIGNIFICANT CHANGE UP
SODIUM SERPL-SCNC: 140 MMOL/L — SIGNIFICANT CHANGE UP (ref 135–145)
T4 AB SER-ACNC: 7.98 UG/DL — SIGNIFICANT CHANGE UP (ref 4.5–11.7)
TSH SERPL-MCNC: 1.74 UIU/ML — SIGNIFICANT CHANGE UP (ref 0.27–4.2)
WBC # BLD: 10.31 K/UL — SIGNIFICANT CHANGE UP (ref 3.8–10.5)
WBC # FLD AUTO: 10.31 K/UL — SIGNIFICANT CHANGE UP (ref 3.8–10.5)

## 2023-05-12 PROCEDURE — 60220 PARTIAL REMOVAL OF THYROID: CPT | Mod: GC

## 2023-05-12 PROCEDURE — 88307 TISSUE EXAM BY PATHOLOGIST: CPT | Mod: 26

## 2023-05-12 PROCEDURE — 71045 X-RAY EXAM CHEST 1 VIEW: CPT | Mod: 26

## 2023-05-12 PROCEDURE — 31600 PLANNED TRACHEOSTOMY: CPT | Mod: GC

## 2023-05-12 DEVICE — CLIP APPLIER COVIDIEN SURGICLIP III 9" SM: Type: IMPLANTABLE DEVICE | Status: FUNCTIONAL

## 2023-05-12 DEVICE — CLIP APPLIER ETHICON LIGACLIP 11.5" MEDIUM: Type: IMPLANTABLE DEVICE | Status: FUNCTIONAL

## 2023-05-12 DEVICE — ARISTA 3GR: Type: IMPLANTABLE DEVICE | Status: FUNCTIONAL

## 2023-05-12 DEVICE — TUBE TRACH SZ 6 CUFF FLEX REUSE: Type: IMPLANTABLE DEVICE | Status: FUNCTIONAL

## 2023-05-12 RX ORDER — CEFAZOLIN SODIUM 1 G
2000 VIAL (EA) INJECTION EVERY 8 HOURS
Refills: 0 | Status: COMPLETED | OUTPATIENT
Start: 2023-05-12 | End: 2023-05-13

## 2023-05-12 RX ORDER — PANTOPRAZOLE SODIUM 20 MG/1
40 TABLET, DELAYED RELEASE ORAL EVERY 24 HOURS
Refills: 0 | Status: DISCONTINUED | OUTPATIENT
Start: 2023-05-12 | End: 2023-05-18

## 2023-05-12 RX ORDER — SODIUM CHLORIDE 9 MG/ML
1000 INJECTION, SOLUTION INTRAVENOUS
Refills: 0 | Status: DISCONTINUED | OUTPATIENT
Start: 2023-05-12 | End: 2023-05-18

## 2023-05-12 RX ORDER — HYDROMORPHONE HYDROCHLORIDE 2 MG/ML
0.25 INJECTION INTRAMUSCULAR; INTRAVENOUS; SUBCUTANEOUS EVERY 6 HOURS
Refills: 0 | Status: DISCONTINUED | OUTPATIENT
Start: 2023-05-12 | End: 2023-05-12

## 2023-05-12 RX ORDER — PANTOPRAZOLE SODIUM 20 MG/1
40 TABLET, DELAYED RELEASE ORAL ONCE
Refills: 0 | Status: DISCONTINUED | OUTPATIENT
Start: 2023-05-12 | End: 2023-05-12

## 2023-05-12 RX ORDER — GLUCAGON INJECTION, SOLUTION 0.5 MG/.1ML
1 INJECTION, SOLUTION SUBCUTANEOUS ONCE
Refills: 0 | Status: DISCONTINUED | OUTPATIENT
Start: 2023-05-12 | End: 2023-05-18

## 2023-05-12 RX ORDER — DEXTROSE 50 % IN WATER 50 %
15 SYRINGE (ML) INTRAVENOUS ONCE
Refills: 0 | Status: DISCONTINUED | OUTPATIENT
Start: 2023-05-12 | End: 2023-05-18

## 2023-05-12 RX ORDER — HYDROMORPHONE HYDROCHLORIDE 2 MG/ML
0.25 INJECTION INTRAMUSCULAR; INTRAVENOUS; SUBCUTANEOUS
Refills: 0 | Status: DISCONTINUED | OUTPATIENT
Start: 2023-05-12 | End: 2023-05-14

## 2023-05-12 RX ORDER — CHLORHEXIDINE GLUCONATE 213 G/1000ML
1 SOLUTION TOPICAL
Refills: 0 | Status: DISCONTINUED | OUTPATIENT
Start: 2023-05-12 | End: 2023-05-14

## 2023-05-12 RX ORDER — DEXAMETHASONE 0.5 MG/5ML
8 ELIXIR ORAL EVERY 8 HOURS
Refills: 0 | Status: COMPLETED | OUTPATIENT
Start: 2023-05-12 | End: 2023-05-13

## 2023-05-12 RX ORDER — MAGNESIUM SULFATE 500 MG/ML
2 VIAL (ML) INJECTION ONCE
Refills: 0 | Status: COMPLETED | OUTPATIENT
Start: 2023-05-12 | End: 2023-05-12

## 2023-05-12 RX ORDER — HYDROMORPHONE HYDROCHLORIDE 2 MG/ML
0.5 INJECTION INTRAMUSCULAR; INTRAVENOUS; SUBCUTANEOUS
Refills: 0 | Status: DISCONTINUED | OUTPATIENT
Start: 2023-05-12 | End: 2023-05-14

## 2023-05-12 RX ORDER — DEXTROSE 50 % IN WATER 50 %
25 SYRINGE (ML) INTRAVENOUS ONCE
Refills: 0 | Status: DISCONTINUED | OUTPATIENT
Start: 2023-05-12 | End: 2023-05-18

## 2023-05-12 RX ORDER — LEVOTHYROXINE SODIUM 125 MCG
100 TABLET ORAL
Refills: 0 | Status: DISCONTINUED | OUTPATIENT
Start: 2023-05-13 | End: 2023-05-15

## 2023-05-12 RX ORDER — CHLORHEXIDINE GLUCONATE 213 G/1000ML
1 SOLUTION TOPICAL
Refills: 0 | Status: DISCONTINUED | OUTPATIENT
Start: 2023-05-12 | End: 2023-05-13

## 2023-05-12 RX ORDER — INSULIN LISPRO 100/ML
VIAL (ML) SUBCUTANEOUS EVERY 6 HOURS
Refills: 0 | Status: DISCONTINUED | OUTPATIENT
Start: 2023-05-12 | End: 2023-05-18

## 2023-05-12 RX ORDER — SODIUM CHLORIDE 9 MG/ML
1000 INJECTION, SOLUTION INTRAVENOUS
Refills: 0 | Status: DISCONTINUED | OUTPATIENT
Start: 2023-05-12 | End: 2023-05-13

## 2023-05-12 RX ORDER — ACETAMINOPHEN 500 MG
1000 TABLET ORAL ONCE
Refills: 0 | Status: COMPLETED | OUTPATIENT
Start: 2023-05-12 | End: 2023-05-13

## 2023-05-12 RX ADMIN — SODIUM CHLORIDE 120 MILLILITER(S): 9 INJECTION, SOLUTION INTRAVENOUS at 17:56

## 2023-05-12 RX ADMIN — Medication 25 GRAM(S): at 17:54

## 2023-05-12 RX ADMIN — HYDROMORPHONE HYDROCHLORIDE 0.5 MILLIGRAM(S): 2 INJECTION INTRAMUSCULAR; INTRAVENOUS; SUBCUTANEOUS at 21:30

## 2023-05-12 RX ADMIN — HYDROMORPHONE HYDROCHLORIDE 0.5 MILLIGRAM(S): 2 INJECTION INTRAMUSCULAR; INTRAVENOUS; SUBCUTANEOUS at 23:41

## 2023-05-12 RX ADMIN — PANTOPRAZOLE SODIUM 40 MILLIGRAM(S): 20 TABLET, DELAYED RELEASE ORAL at 17:03

## 2023-05-12 RX ADMIN — HYDROMORPHONE HYDROCHLORIDE 0.25 MILLIGRAM(S): 2 INJECTION INTRAMUSCULAR; INTRAVENOUS; SUBCUTANEOUS at 18:05

## 2023-05-12 RX ADMIN — Medication 100 MILLIGRAM(S): at 20:35

## 2023-05-12 RX ADMIN — Medication 101.6 MILLIGRAM(S): at 22:15

## 2023-05-12 RX ADMIN — HYDROMORPHONE HYDROCHLORIDE 0.5 MILLIGRAM(S): 2 INJECTION INTRAMUSCULAR; INTRAVENOUS; SUBCUTANEOUS at 20:28

## 2023-05-12 RX ADMIN — HYDROMORPHONE HYDROCHLORIDE 0.25 MILLIGRAM(S): 2 INJECTION INTRAMUSCULAR; INTRAVENOUS; SUBCUTANEOUS at 17:50

## 2023-05-12 NOTE — PRE-ANESTHESIA EVALUATION ADULT - NSANTHOSAYNRD_GEN_A_CORE
[FreeTextEntry1] : I, Annette Boyd, acted solely as a scribe for Dr. Hayes eFlipe on this date 04/06/2022.\par  No. LANDEN screening performed.  STOP BANG Legend: 0-2 = LOW Risk; 3-4 = INTERMEDIATE Risk; 5-8 = HIGH Risk

## 2023-05-12 NOTE — BRIEF OPERATIVE NOTE - NSICDXBRIEFPOSTOP_GEN_ALL_CORE_FT
POST-OP DIAGNOSIS:  Disorder of vocal cord 12-May-2023 15:41:47  Edgardo Gray  Thyroid nodule 12-May-2023 15:41:40  Edgardo Gray

## 2023-05-12 NOTE — BRIEF OPERATIVE NOTE - NSICDXBRIEFPROCEDURE_GEN_ALL_CORE_FT
PROCEDURES:  Completion thyroidectomy 12-May-2023 15:41:04  Edgardo Gray  Tracheotomy, adult 12-May-2023 15:41:11  Edgardo Gray

## 2023-05-12 NOTE — H&P ADULT - ASSESSMENT
A/P: 47y Male PMH R hemithyroidectomy w sternotomy c/b R TVC paralysis. Found to have L thyroid nodule now s/p L completion thyroidectomy and tracheostomy. Likely with new L RLN praxis.   - ancef  - can start DVT ppx tomorrow  - decadron x3 doses  - PACU PTH and Ca  - synthroid to start tomorrow  - NPO for now

## 2023-05-12 NOTE — CONSULT NOTE ADULT - ASSESSMENT
47M, current smoker, with PMHx of GERD, thyroid goiter s/p R thyroidectomy and R VATS in who presented for completion thyroidectomy on 5/12 c/b post extubation stridor and respiratory distress for which surgical tracheostomy was performed.. Transferred to the SICU post operatively for monitoring in setting on new tracheostomy.     NEURO: Pain and nausea control PRN: A&Ox4  HEENT: New 6CN 75H cuffed trach. Starting IV synthroid POD1  CV: goal MAP >65  PULM: Saturating well on trach collar. Ween to RA as tolerated  GI/FEN: NPO, LR@120 cc/hr, PPI. Bedside swallow eval pending  : No caruso, voids  ENDO: ISS. Decadron 8q8.  HEME: Post op Hb XX from 15.3 pre-operatively   ID: Perioperative Ancef  PPx: Holding HSQ. Restart POD1  LINES: PIVs  PT/OT: Not ordered  DISPO: SICU 47M, current smoker, with PMHx of GERD, thyroid goiter s/p R thyroidectomy and R VATS in who presented for completion thyroidectomy on 5/12 c/b post extubation stridor and respiratory distress for which surgical tracheostomy was performed.. Transferred to the SICU post operatively for monitoring in setting on new tracheostomy.     NEURO: Pain and nausea control PRN: A&Ox4  HEENT: New 6CN 75H cuffed trach. Starting IV synthroid POD1  CV: goal MAP >65  PULM: Saturating well on trach collar. Ween to RA as tolerated  GI/FEN: NPO, LR@120 cc/hr, PPI. Bedside swallow eval pending  : No caruso, voids  ENDO: ISS. Decadron 8q8.  HEME: Post op Hb 14.2 from 15.3 pre-operatively   ID: Perioperative Ancef  PPx: Holding HSQ. Restart POD1  LINES: PIVs  PT/OT: Not ordered  DISPO: SICU

## 2023-05-12 NOTE — BRIEF OPERATIVE NOTE - OPERATION/FINDINGS
left nodular goiter, recurrent laryngeal grossly intact on the left with decreased stimulation, , gross identification of the parathyroids

## 2023-05-12 NOTE — H&P ADULT - HISTORY OF PRESENT ILLNESS
44M, 30 pack year smoking history, s/p 8/25/20 right hemithyroidectomy via neck + right thorax (robotic) + sternotomy (Inra), final path thyroid with adenomatoid nodules and benign lymph nodes. Procedure c/b persistent R TVC paralysis and intermittent dysphagia. Pt noted to have L thyroid nodule, increased in size from prior imaging. Decision was made to proceed with L completion thyroidectomy and possible tracheostomy (6CN). Pt now s/p L completion thyroidectomy and tracheostomy. Transferred to PACU without issue.

## 2023-05-12 NOTE — BRIEF OPERATIVE NOTE - NSICDXBRIEFPREOP_GEN_ALL_CORE_FT
PRE-OP DIAGNOSIS:  Thyroid nodule 12-May-2023 15:41:22  Edgardo Gray  Disorder of vocal cord 12-May-2023 15:41:36  Edgardo Gray

## 2023-05-12 NOTE — CONSULT NOTE ADULT - SUBJECTIVE AND OBJECTIVE BOX
SICU CONSULT NOTE    HPI:  47 year-old male, current smoker (30 pack year, smokes 10 cigarettes daily) with PMHx of GERD and PSHx of mediastinal R hemithyroidectomy via danilo-sternotomy and R VATS 2020 for goiter who presented on 5/12/23 to [facility] for elective completion thyroidectomy.    SICU ADDENDUM: 47M, current smoker, with PMHx of GERD, thyroid goiter s/p R thyroidectomy and R VATS in 2020 c/b R vocal chord paralysis who presented for completion thyroidectomy on 5/12. Intraoperatively, there was difficulty identiying R recurrent laryngeal nerve, but was otherwise uncomplicated. Immediatley post op, patient developed stridor and respiratory distress with suspected bilateral vocal cord paralysis. Paitent reintubated and surgical tracheotomy performed. Transferred to the SICU post operatively for monitoring in setting on new tracheostomy.     PAST MEDICAL HISTORY:  - Peptic Ulcer Disease  - Dysphagia  - Tracheal Deviation    PAST SURGICAL HISTORY:  - Right Shoulder Labral Repair  - Right Thyroidectomy, R VATS (2020)    REVIEW OF SYSTEMS:   Pertinent positives/negatives noted in HPI.     HOME MEDICATIONS:  None    ALLERGIES:  No Known Drug Allergies  - Shellfish (Anaphylaxis)    Intolerances:  None    SOCIAL HISTORY: Patient endorses smoking 10 cigarettes per day and has a 30+ pack smoking hitsory. Patient consumes EtOH socially, and THC occassionally.    FAMILY HISTORY:  No pertinent family history in first degree relatives      MEDICATIONS  (STANDING):  ceFAZolin   IVPB 2000 milliGRAM(s) IV Intermittent every 8 hours  chlorhexidine 2% Cloths 1 Application(s) Topical <User Schedule>  dexAMETHasone  IVPB 8 milliGRAM(s) IV Intermittent every 8 hours  dextrose 5%. 1000 milliLiter(s) (50 mL/Hr) IV Continuous <Continuous>  dextrose 50% Injectable 25 Gram(s) IV Push once  glucagon  Injectable 1 milliGRAM(s) IntraMuscular once  insulin lispro (ADMELOG) corrective regimen sliding scale   SubCutaneous every 6 hours  lactated ringers. 1000 milliLiter(s) (120 mL/Hr) IV Continuous <Continuous>  pantoprazole  Injectable 40 milliGRAM(s) IV Push every 24 hours    MEDICATIONS  (PRN):  dextrose Oral Gel 15 Gram(s) Oral once PRN Blood Glucose LESS THAN 70 milliGRAM(s)/deciliter        PHYSICAL EXAM:  T(C): 36.1 C  HR: 81  BP: 135/86,   RR: 12  SpO2: 100% on trach collra    GENERAL: NAD, Resting comfortably in bed, awake, opens eyes spontaneously  HEENT: NCAT, MMM, Normal conjunctiva, 6cuffed tracheostomy tube sutured in place. Mild sanguinous oozing noted around tracheostomy.   RESP: Nonlabored breathing on tracheostomy (15L), No respiratory distress  CARD: Normal rate, Normal peripheral perfusion  GI: Soft, mild distetion, NT, No guarding, No rebound tenderness  EXTREM: WWP, No edema, No gross deformity of extremities  VASC: Palpable peripheral pulses  SKIN: No rashes, no lesions  NEURO: AAOx3, No focal motor or sensory deficits  PSYCH: Affect and characteristics of appearance, verbalizations, and behaviors are appropriate        Vital Signs Last 24 Hrs  T(C): 36.1 (12 May 2023 15:21), Max: 36.1 (12 May 2023 15:21)  T(F): 96.9 (12 May 2023 15:21), Max: 96.9 (12 May 2023 15:21)  HR: 81 (12 May 2023 15:21) (81 - 81)  BP: 135/86 (12 May 2023 15:21) (135/86 - 135/86)  BP(mean): 106 (12 May 2023 15:21) (106 - 106)  RR: 10 (12 May 2023 15:21) (10 - 10)  SpO2: 100% (12 May 2023 15:21) (100% - 100%)          LABS:                        14.2   10.31 )-----------( 265      ( 12 May 2023 15:39 )             43.1            SICU CONSULT NOTE    HPI:  47 year-old male, current smoker (30 pack year, smokes 10 cigarettes daily) with PMHx of GERD and PSHx of mediastinal R hemithyroidectomy via danilo-sternotomy and R VATS 2020 for goiter who presented on 5/12/23 to [facility] for elective completion thyroidectomy.    SICU ADDENDUM: 47M, current smoker, with PMHx of GERD, thyroid goiter s/p R thyroidectomy and R VATS in 2020 c/b R vocal chord paralysis who presented for completion thyroidectomy on 5/12. Intraoperatively, there was difficulty identiying R recurrent laryngeal nerve, but was otherwise uncomplicated. Immediatley post op, patient developed stridor and respiratory distress with suspected bilateral vocal cord paralysis. Paitent reintubated and surgical tracheotomy performed. Transferred to the SICU post operatively for monitoring in setting on new tracheostomy.     PAST MEDICAL HISTORY:  - Peptic Ulcer Disease  - Dysphagia  - Tracheal Deviation    PAST SURGICAL HISTORY:  - Right Shoulder Labral Repair  - Right Thyroidectomy, R VATS (2020)    REVIEW OF SYSTEMS:   Pertinent positives/negatives noted in HPI.     HOME MEDICATIONS:  None    ALLERGIES:  No Known Drug Allergies  - Shellfish (Anaphylaxis)    Intolerances:  None    SOCIAL HISTORY: Patient endorses smoking 10 cigarettes per day and has a 30+ pack smoking hitsory. Patient consumes EtOH socially, and THC occasionally    FAMILY HISTORY:  No pertinent family history in first degree relatives      MEDICATIONS  (STANDING):  ceFAZolin   IVPB 2000 milliGRAM(s) IV Intermittent every 8 hours  chlorhexidine 2% Cloths 1 Application(s) Topical <User Schedule>  dexAMETHasone  IVPB 8 milliGRAM(s) IV Intermittent every 8 hours  dextrose 5%. 1000 milliLiter(s) (50 mL/Hr) IV Continuous <Continuous>  dextrose 50% Injectable 25 Gram(s) IV Push once  glucagon  Injectable 1 milliGRAM(s) IntraMuscular once  insulin lispro (ADMELOG) corrective regimen sliding scale   SubCutaneous every 6 hours  lactated ringers. 1000 milliLiter(s) (120 mL/Hr) IV Continuous <Continuous>  pantoprazole  Injectable 40 milliGRAM(s) IV Push every 24 hours    MEDICATIONS  (PRN):  dextrose Oral Gel 15 Gram(s) Oral once PRN Blood Glucose LESS THAN 70 milliGRAM(s)/deciliter        PHYSICAL EXAM:  T(C): 36.1 C  HR: 81  BP: 135/86,   RR: 12  SpO2: 100% on trach collra    GENERAL: NAD, Resting comfortably in bed, awake, opens eyes spontaneously  HEENT: NCAT, MMM, Normal conjunctiva, 6cuffed tracheostomy tube sutured in place. Mild sanguinous oozing noted around tracheostomy.   RESP: Nonlabored breathing on tracheostomy (15L), No respiratory distress. Mild end expiratory wheezing noted on L  CARD: Normal rate, Normal peripheral perfusion  GI: Soft, mild distetion, NT, No guarding, No rebound tenderness  EXTREM: WWP, No edema, No gross deformity of extremities  VASC: Palpable peripheral pulses  SKIN: No rashes, no lesions  NEURO: AAOx3, No focal motor or sensory deficits  PSYCH: Affect and characteristics of appearance, verbalizations, and behaviors are appropriate        Vital Signs Last 24 Hrs  T(C): 36.1 (12 May 2023 15:21), Max: 36.1 (12 May 2023 15:21)  T(F): 96.9 (12 May 2023 15:21), Max: 96.9 (12 May 2023 15:21)  HR: 81 (12 May 2023 15:21) (81 - 81)  BP: 135/86 (12 May 2023 15:21) (135/86 - 135/86)  BP(mean): 106 (12 May 2023 15:21) (106 - 106)  RR: 10 (12 May 2023 15:21) (10 - 10)  SpO2: 100% (12 May 2023 15:21) (100% - 100%)          LABS:                        14.2   10.31 )-----------( 265      ( 12 May 2023 15:39 )             43.1

## 2023-05-12 NOTE — H&P ADULT - NSHPPHYSICALEXAM_GEN_ALL_CORE
General: NAD, drowsy from anesthesia  Respiratory: 6CN trach in place on trach collar, secured with sutures and trach ties  Face:  Symmetric without masses or lesions  OU: EOMI  OC/OP: tongue normal, floor of mouth WNL, no masses or lesions, OP clear  Neck: soft/flat, 6CN trach in place, lateral thyroidectomy incisions

## 2023-05-13 LAB
A1C WITH ESTIMATED AVERAGE GLUCOSE RESULT: 5.5 % — SIGNIFICANT CHANGE UP (ref 4–5.6)
ALBUMIN SERPL ELPH-MCNC: 4.1 G/DL — SIGNIFICANT CHANGE UP (ref 3.3–5)
ALP SERPL-CCNC: 95 U/L — SIGNIFICANT CHANGE UP (ref 40–120)
ALT FLD-CCNC: 9 U/L — LOW (ref 10–45)
ANION GAP SERPL CALC-SCNC: 11 MMOL/L — SIGNIFICANT CHANGE UP (ref 5–17)
ANION GAP SERPL CALC-SCNC: 11 MMOL/L — SIGNIFICANT CHANGE UP (ref 5–17)
AST SERPL-CCNC: 15 U/L — SIGNIFICANT CHANGE UP (ref 10–40)
BASOPHILS # BLD AUTO: 0.02 K/UL — SIGNIFICANT CHANGE UP (ref 0–0.2)
BASOPHILS NFR BLD AUTO: 0.1 % — SIGNIFICANT CHANGE UP (ref 0–2)
BILIRUB SERPL-MCNC: 0.6 MG/DL — SIGNIFICANT CHANGE UP (ref 0.2–1.2)
BUN SERPL-MCNC: 10 MG/DL — SIGNIFICANT CHANGE UP (ref 7–23)
BUN SERPL-MCNC: 10 MG/DL — SIGNIFICANT CHANGE UP (ref 7–23)
CALCIUM SERPL-MCNC: 8.1 MG/DL — LOW (ref 8.4–10.5)
CALCIUM SERPL-MCNC: 8.5 MG/DL — SIGNIFICANT CHANGE UP (ref 8.4–10.5)
CHLORIDE SERPL-SCNC: 101 MMOL/L — SIGNIFICANT CHANGE UP (ref 96–108)
CHLORIDE SERPL-SCNC: 102 MMOL/L — SIGNIFICANT CHANGE UP (ref 96–108)
CO2 SERPL-SCNC: 22 MMOL/L — SIGNIFICANT CHANGE UP (ref 22–31)
CO2 SERPL-SCNC: 24 MMOL/L — SIGNIFICANT CHANGE UP (ref 22–31)
CREAT SERPL-MCNC: 0.95 MG/DL — SIGNIFICANT CHANGE UP (ref 0.5–1.3)
CREAT SERPL-MCNC: 0.98 MG/DL — SIGNIFICANT CHANGE UP (ref 0.5–1.3)
EGFR: 95 ML/MIN/1.73M2 — SIGNIFICANT CHANGE UP
EGFR: 99 ML/MIN/1.73M2 — SIGNIFICANT CHANGE UP
EOSINOPHIL # BLD AUTO: 0 K/UL — SIGNIFICANT CHANGE UP (ref 0–0.5)
EOSINOPHIL NFR BLD AUTO: 0 % — SIGNIFICANT CHANGE UP (ref 0–6)
ESTIMATED AVERAGE GLUCOSE: 111 MG/DL — SIGNIFICANT CHANGE UP (ref 68–114)
GLUCOSE BLDC GLUCOMTR-MCNC: 110 MG/DL — HIGH (ref 70–99)
GLUCOSE BLDC GLUCOMTR-MCNC: 111 MG/DL — HIGH (ref 70–99)
GLUCOSE BLDC GLUCOMTR-MCNC: 127 MG/DL — HIGH (ref 70–99)
GLUCOSE SERPL-MCNC: 141 MG/DL — HIGH (ref 70–99)
GLUCOSE SERPL-MCNC: 148 MG/DL — HIGH (ref 70–99)
HCT VFR BLD CALC: 46 % — SIGNIFICANT CHANGE UP (ref 39–50)
HGB BLD-MCNC: 15.3 G/DL — SIGNIFICANT CHANGE UP (ref 13–17)
IMM GRANULOCYTES NFR BLD AUTO: 0.5 % — SIGNIFICANT CHANGE UP (ref 0–0.9)
LYMPHOCYTES # BLD AUTO: 0.47 K/UL — LOW (ref 1–3.3)
LYMPHOCYTES # BLD AUTO: 2.9 % — LOW (ref 13–44)
MAGNESIUM SERPL-MCNC: 1.9 MG/DL — SIGNIFICANT CHANGE UP (ref 1.6–2.6)
MCHC RBC-ENTMCNC: 29.7 PG — SIGNIFICANT CHANGE UP (ref 27–34)
MCHC RBC-ENTMCNC: 33.3 GM/DL — SIGNIFICANT CHANGE UP (ref 32–36)
MCV RBC AUTO: 89.1 FL — SIGNIFICANT CHANGE UP (ref 80–100)
MONOCYTES # BLD AUTO: 1 K/UL — HIGH (ref 0–0.9)
MONOCYTES NFR BLD AUTO: 6.1 % — SIGNIFICANT CHANGE UP (ref 2–14)
NEUTROPHILS # BLD AUTO: 14.86 K/UL — HIGH (ref 1.8–7.4)
NEUTROPHILS NFR BLD AUTO: 90.4 % — HIGH (ref 43–77)
NRBC # BLD: 0 /100 WBCS — SIGNIFICANT CHANGE UP (ref 0–0)
PHOSPHATE SERPL-MCNC: 3.9 MG/DL — SIGNIFICANT CHANGE UP (ref 2.5–4.5)
PLATELET # BLD AUTO: 282 K/UL — SIGNIFICANT CHANGE UP (ref 150–400)
POTASSIUM SERPL-MCNC: 4.4 MMOL/L — SIGNIFICANT CHANGE UP (ref 3.5–5.3)
POTASSIUM SERPL-MCNC: 4.5 MMOL/L — SIGNIFICANT CHANGE UP (ref 3.5–5.3)
POTASSIUM SERPL-SCNC: 4.4 MMOL/L — SIGNIFICANT CHANGE UP (ref 3.5–5.3)
POTASSIUM SERPL-SCNC: 4.5 MMOL/L — SIGNIFICANT CHANGE UP (ref 3.5–5.3)
PROT SERPL-MCNC: 7.3 G/DL — SIGNIFICANT CHANGE UP (ref 6–8.3)
PTH-INTACT IO % DIF SERPL: 33.1 PG/ML — SIGNIFICANT CHANGE UP (ref 15–65)
RBC # BLD: 5.16 M/UL — SIGNIFICANT CHANGE UP (ref 4.2–5.8)
RBC # FLD: 13.5 % — SIGNIFICANT CHANGE UP (ref 10.3–14.5)
SODIUM SERPL-SCNC: 135 MMOL/L — SIGNIFICANT CHANGE UP (ref 135–145)
SODIUM SERPL-SCNC: 136 MMOL/L — SIGNIFICANT CHANGE UP (ref 135–145)
WBC # BLD: 16.44 K/UL — HIGH (ref 3.8–10.5)
WBC # FLD AUTO: 16.44 K/UL — HIGH (ref 3.8–10.5)

## 2023-05-13 PROCEDURE — 71045 X-RAY EXAM CHEST 1 VIEW: CPT | Mod: 26

## 2023-05-13 PROCEDURE — 43752 NASAL/OROGASTRIC W/TUBE PLMT: CPT

## 2023-05-13 PROCEDURE — 99233 SBSQ HOSP IP/OBS HIGH 50: CPT

## 2023-05-13 RX ORDER — HEPARIN SODIUM 5000 [USP'U]/ML
5000 INJECTION INTRAVENOUS; SUBCUTANEOUS EVERY 8 HOURS
Refills: 0 | Status: DISCONTINUED | OUTPATIENT
Start: 2023-05-13 | End: 2023-05-13

## 2023-05-13 RX ORDER — SODIUM CHLORIDE 9 MG/ML
1000 INJECTION, SOLUTION INTRAVENOUS
Refills: 0 | Status: DISCONTINUED | OUTPATIENT
Start: 2023-05-13 | End: 2023-05-14

## 2023-05-13 RX ORDER — LANOLIN ALCOHOL/MO/W.PET/CERES
5 CREAM (GRAM) TOPICAL AT BEDTIME
Refills: 0 | Status: DISCONTINUED | OUTPATIENT
Start: 2023-05-13 | End: 2023-05-15

## 2023-05-13 RX ORDER — METOCLOPRAMIDE HCL 10 MG
10 TABLET ORAL ONCE
Refills: 0 | Status: COMPLETED | OUTPATIENT
Start: 2023-05-13 | End: 2023-05-13

## 2023-05-13 RX ORDER — CEFAZOLIN SODIUM 1 G
2000 VIAL (EA) INJECTION EVERY 8 HOURS
Refills: 0 | Status: DISCONTINUED | OUTPATIENT
Start: 2023-05-13 | End: 2023-05-18

## 2023-05-13 RX ORDER — LABETALOL HCL 100 MG
10 TABLET ORAL ONCE
Refills: 0 | Status: COMPLETED | OUTPATIENT
Start: 2023-05-13 | End: 2023-05-13

## 2023-05-13 RX ORDER — NIMODIPINE 60 MG/10ML
30 SOLUTION ORAL
Refills: 0 | Status: DISCONTINUED | OUTPATIENT
Start: 2023-05-13 | End: 2023-05-18

## 2023-05-13 RX ORDER — LABETALOL HCL 100 MG
10 TABLET ORAL EVERY 6 HOURS
Refills: 0 | Status: DISCONTINUED | OUTPATIENT
Start: 2023-05-13 | End: 2023-05-15

## 2023-05-13 RX ORDER — NIMODIPINE 60 MG/10ML
30 SOLUTION ORAL
Refills: 0 | Status: DISCONTINUED | OUTPATIENT
Start: 2023-05-13 | End: 2023-05-13

## 2023-05-13 RX ORDER — MAGNESIUM SULFATE 500 MG/ML
1 VIAL (ML) INJECTION ONCE
Refills: 0 | Status: COMPLETED | OUTPATIENT
Start: 2023-05-13 | End: 2023-05-13

## 2023-05-13 RX ORDER — NIMODIPINE 60 MG/10ML
60 SOLUTION ORAL EVERY 4 HOURS
Refills: 0 | Status: DISCONTINUED | OUTPATIENT
Start: 2023-05-13 | End: 2023-05-13

## 2023-05-13 RX ORDER — HEPARIN SODIUM 5000 [USP'U]/ML
5000 INJECTION INTRAVENOUS; SUBCUTANEOUS EVERY 8 HOURS
Refills: 0 | Status: DISCONTINUED | OUTPATIENT
Start: 2023-05-13 | End: 2023-05-18

## 2023-05-13 RX ADMIN — NIMODIPINE 30 MILLIGRAM(S): 60 SOLUTION ORAL at 22:36

## 2023-05-13 RX ADMIN — Medication 10 MILLIGRAM(S): at 07:24

## 2023-05-13 RX ADMIN — Medication 1000 MILLIGRAM(S): at 02:10

## 2023-05-13 RX ADMIN — HEPARIN SODIUM 5000 UNIT(S): 5000 INJECTION INTRAVENOUS; SUBCUTANEOUS at 14:13

## 2023-05-13 RX ADMIN — Medication 100 MICROGRAM(S): at 06:31

## 2023-05-13 RX ADMIN — HEPARIN SODIUM 5000 UNIT(S): 5000 INJECTION INTRAVENOUS; SUBCUTANEOUS at 21:47

## 2023-05-13 RX ADMIN — Medication 10 MILLIGRAM(S): at 09:46

## 2023-05-13 RX ADMIN — Medication 101.6 MILLIGRAM(S): at 14:50

## 2023-05-13 RX ADMIN — Medication 100 MILLIGRAM(S): at 17:33

## 2023-05-13 RX ADMIN — Medication 101.6 MILLIGRAM(S): at 06:30

## 2023-05-13 RX ADMIN — PANTOPRAZOLE SODIUM 40 MILLIGRAM(S): 20 TABLET, DELAYED RELEASE ORAL at 17:33

## 2023-05-13 RX ADMIN — CHLORHEXIDINE GLUCONATE 1 APPLICATION(S): 213 SOLUTION TOPICAL at 06:32

## 2023-05-13 RX ADMIN — Medication 400 MILLIGRAM(S): at 01:47

## 2023-05-13 RX ADMIN — Medication 100 MILLIGRAM(S): at 11:30

## 2023-05-13 RX ADMIN — Medication 100 MILLIGRAM(S): at 21:47

## 2023-05-13 RX ADMIN — Medication 5 MILLIGRAM(S): at 21:47

## 2023-05-13 RX ADMIN — Medication 100 GRAM(S): at 09:46

## 2023-05-13 RX ADMIN — HEPARIN SODIUM 5000 UNIT(S): 5000 INJECTION INTRAVENOUS; SUBCUTANEOUS at 07:24

## 2023-05-13 RX ADMIN — Medication 100 MILLIGRAM(S): at 04:25

## 2023-05-13 NOTE — SWALLOW BEDSIDE ASSESSMENT ADULT - SWALLOW EVAL: SECRETION MANAGEMENT
trach suctioning completed prior and post PO; Pt is requiring frequent suctioning./expectorate independently

## 2023-05-13 NOTE — SWALLOW BEDSIDE ASSESSMENT ADULT - SWALLOW EVAL: VELAR ELEVATION
bilaterally reduced, though a degree of elevation noted bilaterally reduced, though a degree of elevation noted; foamy white secretions noted in the posterior BOT/soft palate region

## 2023-05-13 NOTE — SWALLOW BEDSIDE ASSESSMENT ADULT - COMMENTS
Pt known to our service and last seen on 8/26/20 and 8/27/20 with impressions of a seemingly functional swallow. SLP recommended regular consistency and thin liquids.   Per discussion with Pt and caregiver Viktoriya, Pt endorsed dysphagia with solids and liquids for a few months post op, however, eventually returned to a regular diet without difficulty. He denied any concern for PNAs.

## 2023-05-13 NOTE — SWALLOW BEDSIDE ASSESSMENT ADULT - SLP PERTINENT HISTORY OF CURRENT PROBLEM
PMHz of 30 pack/year smoking hx, GERD, and s/p mediastinal R hemithyroidectomy via hemisternotomy and R VATS on 8/25/20, final path thyroid with adenomatoid nodules and benign lymph nodes. Procedure c/b persistent R TVC paralysis and intermittent dysphagia. Pt noted to have L thyroid nodule, increased in size from prior imaging. Decision was made to proceed with L completion thyroidectomy. Pt admitted to [facility] on 5/12/23 and now s/p L completion thyroidectomy under the direction of Dr. Gray. Immediately post op, Pt developed stridor and respiratory distress with suspected L VC praxis in addition to known R VC paralysis. Pt reintubated and surgical tracheostomy performed (6CN). Op findings: left nodular goiter, recurrent laryngeal grossly intact on the left with decreased stimulation, gross identification of the parathyroids.

## 2023-05-13 NOTE — PROGRESS NOTE ADULT - NSPROGADDITIONALINFOA_GEN_ALL_CORE
Pt  was seen and examined   scope performed - adducted TVC  right paralysis, left paresis   trach functional   nimodipine started with NGT

## 2023-05-13 NOTE — SWALLOW BEDSIDE ASSESSMENT ADULT - SLP GENERAL OBSERVATIONS
Pt was seen fully awake and alert, HOB fully elevated, on O2 via trach collar (Shiley sz 6, cuff fully deflated). Bloody tinged secretions noted around trach and coughed out. Pt followed simple directives and communicated wants/needs via mouthing and leak voicing (min). Pt's caregiver, Viktoriya at bedside.

## 2023-05-13 NOTE — SWALLOW BEDSIDE ASSESSMENT ADULT - SWALLOW EVAL: DIAGNOSIS
Nallely clinical indicators of aspiration noted with ice chips and thin liquids marked by nallely prolonged cough post the swallow with liquids>ice chips and trace tinged blue dyed secretions expectorated out of tracheostomy. Given concern for b/l vocal cord paralysis, increased aspiration risk with glottic insufficiency, and subsequent potential pulmonary complications, recommend an objective evaluation to further assess swallowing physiology. Modified Barium Swallow Study preferred to allow for continuous view. FEES deferred at this time due to concern of likely poor view of the glottis given b/l VC paralysis in the paramedian position. Further dysphagia intervention TBD pending objective assessment results.

## 2023-05-13 NOTE — PATIENT PROFILE ADULT - FALL HARM RISK - HARM RISK INTERVENTIONS

## 2023-05-13 NOTE — SWALLOW BEDSIDE ASSESSMENT ADULT - ADDITIONAL RECOMMENDATIONS
If PO is opted for despite associated risks of aspiration, recommend to consider ice chips for comfort with strict oral hygiene and aspiration precautions to minimize risk of developing oral pathogens and aspiration PNA. Would not recommend thickened liquids to resolve aspiration as the risk of SILENT aspiration is high, particularly given bilateral VC paralysis and now need for tracheostomy to maintain airway patency.  Strongly recommend objective assessment of swallow function prior to initiation of an oral diet.

## 2023-05-13 NOTE — SWALLOW BEDSIDE ASSESSMENT ADULT - SWALLOW EVAL: RECOMMENDED DIET
NPO with alternate means of nutrition/hydration NPO; consider alternate means of nutrition/hydration

## 2023-05-13 NOTE — SWALLOW BEDSIDE ASSESSMENT ADULT - PHARYNGEAL PHASE
Suspect delayed swallow and reduced hyolaryngeal movement noted via palpation. Terry clinical indicators of penetration/aspiration noted across oral intake. Multiple swallows noted suspect in response to airway invasion. Suspect delayed swallow and reduced hyolaryngeal movement noted via palpation. Terry clinical indicators of penetration/aspiration noted across oral intake. Trace blue dyed tinged secretions coughed out post thin liquids. Multiple swallows noted suspect in response to airway invasion.

## 2023-05-14 LAB
ANION GAP SERPL CALC-SCNC: 11 MMOL/L — SIGNIFICANT CHANGE UP (ref 5–17)
BUN SERPL-MCNC: 10 MG/DL — SIGNIFICANT CHANGE UP (ref 7–23)
CALCIUM SERPL-MCNC: 8.5 MG/DL — SIGNIFICANT CHANGE UP (ref 8.4–10.5)
CHLORIDE SERPL-SCNC: 102 MMOL/L — SIGNIFICANT CHANGE UP (ref 96–108)
CO2 SERPL-SCNC: 25 MMOL/L — SIGNIFICANT CHANGE UP (ref 22–31)
CREAT SERPL-MCNC: 0.82 MG/DL — SIGNIFICANT CHANGE UP (ref 0.5–1.3)
EGFR: 108 ML/MIN/1.73M2 — SIGNIFICANT CHANGE UP
GLUCOSE BLDC GLUCOMTR-MCNC: 101 MG/DL — HIGH (ref 70–99)
GLUCOSE BLDC GLUCOMTR-MCNC: 107 MG/DL — HIGH (ref 70–99)
GLUCOSE BLDC GLUCOMTR-MCNC: 117 MG/DL — HIGH (ref 70–99)
GLUCOSE BLDC GLUCOMTR-MCNC: 133 MG/DL — HIGH (ref 70–99)
GLUCOSE SERPL-MCNC: 102 MG/DL — HIGH (ref 70–99)
HCT VFR BLD CALC: 43.4 % — SIGNIFICANT CHANGE UP (ref 39–50)
HGB BLD-MCNC: 14.7 G/DL — SIGNIFICANT CHANGE UP (ref 13–17)
MAGNESIUM SERPL-MCNC: 1.9 MG/DL — SIGNIFICANT CHANGE UP (ref 1.6–2.6)
MCHC RBC-ENTMCNC: 30.1 PG — SIGNIFICANT CHANGE UP (ref 27–34)
MCHC RBC-ENTMCNC: 33.9 GM/DL — SIGNIFICANT CHANGE UP (ref 32–36)
MCV RBC AUTO: 88.9 FL — SIGNIFICANT CHANGE UP (ref 80–100)
NRBC # BLD: 0 /100 WBCS — SIGNIFICANT CHANGE UP (ref 0–0)
PHOSPHATE SERPL-MCNC: 3.1 MG/DL — SIGNIFICANT CHANGE UP (ref 2.5–4.5)
PLATELET # BLD AUTO: 283 K/UL — SIGNIFICANT CHANGE UP (ref 150–400)
POTASSIUM SERPL-MCNC: 3.9 MMOL/L — SIGNIFICANT CHANGE UP (ref 3.5–5.3)
POTASSIUM SERPL-SCNC: 3.9 MMOL/L — SIGNIFICANT CHANGE UP (ref 3.5–5.3)
RBC # BLD: 4.88 M/UL — SIGNIFICANT CHANGE UP (ref 4.2–5.8)
RBC # FLD: 13.6 % — SIGNIFICANT CHANGE UP (ref 10.3–14.5)
SODIUM SERPL-SCNC: 138 MMOL/L — SIGNIFICANT CHANGE UP (ref 135–145)
WBC # BLD: 14.46 K/UL — HIGH (ref 3.8–10.5)
WBC # FLD AUTO: 14.46 K/UL — HIGH (ref 3.8–10.5)

## 2023-05-14 RX ORDER — MAGNESIUM SULFATE 500 MG/ML
1 VIAL (ML) INJECTION ONCE
Refills: 0 | Status: COMPLETED | OUTPATIENT
Start: 2023-05-14 | End: 2023-05-14

## 2023-05-14 RX ORDER — SENNA PLUS 8.6 MG/1
5 TABLET ORAL AT BEDTIME
Refills: 0 | Status: DISCONTINUED | OUTPATIENT
Start: 2023-05-14 | End: 2023-05-15

## 2023-05-14 RX ORDER — ACETAMINOPHEN 500 MG
650 TABLET ORAL EVERY 4 HOURS
Refills: 0 | Status: DISCONTINUED | OUTPATIENT
Start: 2023-05-14 | End: 2023-05-15

## 2023-05-14 RX ORDER — ALPRAZOLAM 0.25 MG
0.5 TABLET ORAL EVERY 12 HOURS
Refills: 0 | Status: DISCONTINUED | OUTPATIENT
Start: 2023-05-14 | End: 2023-05-14

## 2023-05-14 RX ORDER — OXYCODONE HYDROCHLORIDE 5 MG/1
7.5 TABLET ORAL EVERY 4 HOURS
Refills: 0 | Status: DISCONTINUED | OUTPATIENT
Start: 2023-05-14 | End: 2023-05-14

## 2023-05-14 RX ORDER — POLYETHYLENE GLYCOL 3350 17 G/17G
17 POWDER, FOR SOLUTION ORAL DAILY
Refills: 0 | Status: DISCONTINUED | OUTPATIENT
Start: 2023-05-14 | End: 2023-05-15

## 2023-05-14 RX ORDER — OXYCODONE HYDROCHLORIDE 5 MG/1
7.5 TABLET ORAL EVERY 4 HOURS
Refills: 0 | Status: DISCONTINUED | OUTPATIENT
Start: 2023-05-14 | End: 2023-05-15

## 2023-05-14 RX ORDER — OXYCODONE HYDROCHLORIDE 5 MG/1
5 TABLET ORAL EVERY 4 HOURS
Refills: 0 | Status: DISCONTINUED | OUTPATIENT
Start: 2023-05-14 | End: 2023-05-15

## 2023-05-14 RX ADMIN — HEPARIN SODIUM 5000 UNIT(S): 5000 INJECTION INTRAVENOUS; SUBCUTANEOUS at 22:37

## 2023-05-14 RX ADMIN — Medication 100 MILLIGRAM(S): at 22:47

## 2023-05-14 RX ADMIN — PANTOPRAZOLE SODIUM 40 MILLIGRAM(S): 20 TABLET, DELAYED RELEASE ORAL at 17:26

## 2023-05-14 RX ADMIN — OXYCODONE HYDROCHLORIDE 5 MILLIGRAM(S): 5 TABLET ORAL at 18:15

## 2023-05-14 RX ADMIN — HYDROMORPHONE HYDROCHLORIDE 0.5 MILLIGRAM(S): 2 INJECTION INTRAMUSCULAR; INTRAVENOUS; SUBCUTANEOUS at 02:59

## 2023-05-14 RX ADMIN — HEPARIN SODIUM 5000 UNIT(S): 5000 INJECTION INTRAVENOUS; SUBCUTANEOUS at 06:31

## 2023-05-14 RX ADMIN — Medication 100 MILLIGRAM(S): at 13:24

## 2023-05-14 RX ADMIN — Medication 100 MICROGRAM(S): at 11:42

## 2023-05-14 RX ADMIN — HYDROMORPHONE HYDROCHLORIDE 0.5 MILLIGRAM(S): 2 INJECTION INTRAMUSCULAR; INTRAVENOUS; SUBCUTANEOUS at 03:02

## 2023-05-14 RX ADMIN — Medication 100 MILLIGRAM(S): at 06:31

## 2023-05-14 RX ADMIN — SODIUM CHLORIDE 90 MILLILITER(S): 9 INJECTION, SOLUTION INTRAVENOUS at 11:43

## 2023-05-14 RX ADMIN — OXYCODONE HYDROCHLORIDE 7.5 MILLIGRAM(S): 5 TABLET ORAL at 17:30

## 2023-05-14 RX ADMIN — OXYCODONE HYDROCHLORIDE 5 MILLIGRAM(S): 5 TABLET ORAL at 18:45

## 2023-05-14 RX ADMIN — NIMODIPINE 30 MILLIGRAM(S): 60 SOLUTION ORAL at 13:24

## 2023-05-14 RX ADMIN — NIMODIPINE 30 MILLIGRAM(S): 60 SOLUTION ORAL at 22:36

## 2023-05-14 RX ADMIN — Medication 0.5 MILLIGRAM(S): at 18:47

## 2023-05-14 RX ADMIN — HEPARIN SODIUM 5000 UNIT(S): 5000 INJECTION INTRAVENOUS; SUBCUTANEOUS at 13:25

## 2023-05-14 RX ADMIN — Medication 100 GRAM(S): at 12:47

## 2023-05-14 RX ADMIN — OXYCODONE HYDROCHLORIDE 7.5 MILLIGRAM(S): 5 TABLET ORAL at 18:00

## 2023-05-14 RX ADMIN — Medication 5 MILLIGRAM(S): at 22:36

## 2023-05-14 RX ADMIN — CHLORHEXIDINE GLUCONATE 1 APPLICATION(S): 213 SOLUTION TOPICAL at 06:30

## 2023-05-14 RX ADMIN — NIMODIPINE 30 MILLIGRAM(S): 60 SOLUTION ORAL at 07:33

## 2023-05-14 NOTE — PHYSICAL THERAPY INITIAL EVALUATION ADULT - ADDITIONAL COMMENTS
Pt live in apt with 5 JOS[address] and elevator, with sister and nephew. Indpt at baseline, no activity limitations, no device use, no home modifications.

## 2023-05-14 NOTE — PHYSICAL THERAPY INITIAL EVALUATION ADULT - PRECAUTIONS/LIMITATIONS, REHAB EVAL
[de-identified] : 34 year old woman with no pertinent past medical history who was found to have possible Chiari malformation and small arachnoid cyst during workup for headaches.\par \par She presents today for review of imaging and treatment recommendations.\par Reports she has headaches most days of the week. Denies changes in headaches with position changes, bending or bearing down. Denies visual changes, nausea, vomiting, dizziness.  no known precautions/limitations

## 2023-05-14 NOTE — PHYSICAL THERAPY INITIAL EVALUATION ADULT - ACTIVE RANGE OF MOTION EXAMINATION, REHAB EVAL
sandra. upper extremity Active ROM was WNL (within normal limits)/bilateral lower extremity Active ROM was WNL (within normal limits)

## 2023-05-14 NOTE — PHYSICAL THERAPY INITIAL EVALUATION ADULT - GENERAL OBSERVATIONS, REHAB EVAL
POD#2 thyroidectomy c/b stridor now s/p trach placement. Pt rcvd semi supine, +tele, +TC 8L FiO2 35% with bloody secretions, +IV. +bedalarm, +heplock. Pt agreeable to PT, denies pain. Rhiannon session very well, demo supervision bed mob, transfers, amb 250ft

## 2023-05-15 LAB
ANION GAP SERPL CALC-SCNC: 11 MMOL/L — SIGNIFICANT CHANGE UP (ref 5–17)
BUN SERPL-MCNC: 12 MG/DL — SIGNIFICANT CHANGE UP (ref 7–23)
CALCIUM SERPL-MCNC: 8.6 MG/DL — SIGNIFICANT CHANGE UP (ref 8.4–10.5)
CHLORIDE SERPL-SCNC: 101 MMOL/L — SIGNIFICANT CHANGE UP (ref 96–108)
CO2 SERPL-SCNC: 27 MMOL/L — SIGNIFICANT CHANGE UP (ref 22–31)
CREAT SERPL-MCNC: 0.86 MG/DL — SIGNIFICANT CHANGE UP (ref 0.5–1.3)
EGFR: 107 ML/MIN/1.73M2 — SIGNIFICANT CHANGE UP
GLUCOSE BLDC GLUCOMTR-MCNC: 100 MG/DL — HIGH (ref 70–99)
GLUCOSE BLDC GLUCOMTR-MCNC: 111 MG/DL — HIGH (ref 70–99)
GLUCOSE BLDC GLUCOMTR-MCNC: 117 MG/DL — HIGH (ref 70–99)
GLUCOSE BLDC GLUCOMTR-MCNC: 95 MG/DL — SIGNIFICANT CHANGE UP (ref 70–99)
GLUCOSE SERPL-MCNC: 107 MG/DL — HIGH (ref 70–99)
HCT VFR BLD CALC: 45.8 % — SIGNIFICANT CHANGE UP (ref 39–50)
HGB BLD-MCNC: 15.3 G/DL — SIGNIFICANT CHANGE UP (ref 13–17)
MAGNESIUM SERPL-MCNC: 2 MG/DL — SIGNIFICANT CHANGE UP (ref 1.6–2.6)
MCHC RBC-ENTMCNC: 29.8 PG — SIGNIFICANT CHANGE UP (ref 27–34)
MCHC RBC-ENTMCNC: 33.4 GM/DL — SIGNIFICANT CHANGE UP (ref 32–36)
MCV RBC AUTO: 89.3 FL — SIGNIFICANT CHANGE UP (ref 80–100)
NRBC # BLD: 0 /100 WBCS — SIGNIFICANT CHANGE UP (ref 0–0)
PHOSPHATE SERPL-MCNC: 3.9 MG/DL — SIGNIFICANT CHANGE UP (ref 2.5–4.5)
PLATELET # BLD AUTO: 304 K/UL — SIGNIFICANT CHANGE UP (ref 150–400)
POTASSIUM SERPL-MCNC: 4.1 MMOL/L — SIGNIFICANT CHANGE UP (ref 3.5–5.3)
POTASSIUM SERPL-SCNC: 4.1 MMOL/L — SIGNIFICANT CHANGE UP (ref 3.5–5.3)
RBC # BLD: 5.13 M/UL — SIGNIFICANT CHANGE UP (ref 4.2–5.8)
RBC # FLD: 13.3 % — SIGNIFICANT CHANGE UP (ref 10.3–14.5)
SODIUM SERPL-SCNC: 139 MMOL/L — SIGNIFICANT CHANGE UP (ref 135–145)
WBC # BLD: 11.32 K/UL — HIGH (ref 3.8–10.5)
WBC # FLD AUTO: 11.32 K/UL — HIGH (ref 3.8–10.5)

## 2023-05-15 PROCEDURE — 74230 X-RAY XM SWLNG FUNCJ C+: CPT | Mod: 26

## 2023-05-15 RX ORDER — ACETAMINOPHEN 500 MG
650 TABLET ORAL EVERY 6 HOURS
Refills: 0 | Status: DISCONTINUED | OUTPATIENT
Start: 2023-05-15 | End: 2023-05-18

## 2023-05-15 RX ORDER — LANOLIN ALCOHOL/MO/W.PET/CERES
1 CREAM (GRAM) TOPICAL AT BEDTIME
Refills: 0 | Status: DISCONTINUED | OUTPATIENT
Start: 2023-05-15 | End: 2023-05-18

## 2023-05-15 RX ORDER — LEVOTHYROXINE SODIUM 125 MCG
100 TABLET ORAL DAILY
Refills: 0 | Status: DISCONTINUED | OUTPATIENT
Start: 2023-05-16 | End: 2023-05-18

## 2023-05-15 RX ORDER — OXYCODONE HYDROCHLORIDE 5 MG/1
10 TABLET ORAL EVERY 6 HOURS
Refills: 0 | Status: DISCONTINUED | OUTPATIENT
Start: 2023-05-15 | End: 2023-05-18

## 2023-05-15 RX ORDER — OXYCODONE HYDROCHLORIDE 5 MG/1
5 TABLET ORAL EVERY 4 HOURS
Refills: 0 | Status: DISCONTINUED | OUTPATIENT
Start: 2023-05-15 | End: 2023-05-18

## 2023-05-15 RX ORDER — POLYETHYLENE GLYCOL 3350 17 G/17G
17 POWDER, FOR SOLUTION ORAL DAILY
Refills: 0 | Status: DISCONTINUED | OUTPATIENT
Start: 2023-05-15 | End: 2023-05-18

## 2023-05-15 RX ORDER — HYDROMORPHONE HYDROCHLORIDE 2 MG/ML
0.5 INJECTION INTRAMUSCULAR; INTRAVENOUS; SUBCUTANEOUS EVERY 6 HOURS
Refills: 0 | Status: DISCONTINUED | OUTPATIENT
Start: 2023-05-15 | End: 2023-05-15

## 2023-05-15 RX ORDER — SENNA PLUS 8.6 MG/1
1 TABLET ORAL DAILY
Refills: 0 | Status: DISCONTINUED | OUTPATIENT
Start: 2023-05-15 | End: 2023-05-18

## 2023-05-15 RX ADMIN — Medication 100 MILLIGRAM(S): at 06:45

## 2023-05-15 RX ADMIN — Medication 100 MILLIGRAM(S): at 21:48

## 2023-05-15 RX ADMIN — NIMODIPINE 30 MILLIGRAM(S): 60 SOLUTION ORAL at 14:51

## 2023-05-15 RX ADMIN — HYDROMORPHONE HYDROCHLORIDE 0.5 MILLIGRAM(S): 2 INJECTION INTRAMUSCULAR; INTRAVENOUS; SUBCUTANEOUS at 13:04

## 2023-05-15 RX ADMIN — PANTOPRAZOLE SODIUM 40 MILLIGRAM(S): 20 TABLET, DELAYED RELEASE ORAL at 14:51

## 2023-05-15 RX ADMIN — HEPARIN SODIUM 5000 UNIT(S): 5000 INJECTION INTRAVENOUS; SUBCUTANEOUS at 14:50

## 2023-05-15 RX ADMIN — HEPARIN SODIUM 5000 UNIT(S): 5000 INJECTION INTRAVENOUS; SUBCUTANEOUS at 21:49

## 2023-05-15 RX ADMIN — OXYCODONE HYDROCHLORIDE 10 MILLIGRAM(S): 5 TABLET ORAL at 23:00

## 2023-05-15 RX ADMIN — HEPARIN SODIUM 5000 UNIT(S): 5000 INJECTION INTRAVENOUS; SUBCUTANEOUS at 06:45

## 2023-05-15 RX ADMIN — OXYCODONE HYDROCHLORIDE 10 MILLIGRAM(S): 5 TABLET ORAL at 22:03

## 2023-05-15 RX ADMIN — Medication 100 MILLIGRAM(S): at 14:48

## 2023-05-15 RX ADMIN — NIMODIPINE 30 MILLIGRAM(S): 60 SOLUTION ORAL at 21:49

## 2023-05-15 RX ADMIN — Medication 100 MICROGRAM(S): at 06:45

## 2023-05-15 NOTE — DIETITIAN INITIAL EVALUATION ADULT - ENERGY INTAKE
44M, s/p 8/25/20 right hemithyroidectomy via neck + right thorax (robotic) + sternotomy (Inra), final path thyroid with adenomatoid nodules and benign lymph nodes. Procedure c/b persistent R TVC paralysis and intermittent dysphagia. Pt noted to have L thyroid nodule, increased in size from prior imaging. Decision was made to proceed with L completion thyroidectomy and possible tracheostomy (6CN). Pt now s/p L completion thyroidectomy and tracheostomy 5/12. Transferred to PACU without issue. Cuff deflated by ENT 5/13. SDU 5/14.     Pt admitted to [facility]. Spoke with RN at pt off the unit during time of RD visit. Noted: Trached with trach collar at 35% humidification, Secretions copious (blood tinged and thin) with Suctioning requirement minimal. Pt consulted for TF Recs. Pt with TF order at this time however RN reports pt pulled out NGT by mistake. NGT not yet replaced in setting of Pending Xray Cinesophagram Swallow Function, Contrast 5/15. RN reports pt having normal BM, LBM per flow sheets 5/14. Ordered for Miralax senna and Protonix. Na K Mg Phos WDL. Shellfish allergy noted. No pain. Giorgio 21. No edema or pressure ulcers.   Please see below for nutritions recommendations.

## 2023-05-15 NOTE — PROVIDER CONTACT NOTE (OTHER) - ACTION/TREATMENT ORDERED:
Feeds and oral meds held till NGT is replaced.
Todd Decker advised she will come to bedside and check on the sutures and will place an off monitor for the pt. Pt may go to x-ray w/o O2.

## 2023-05-15 NOTE — PROVIDER CONTACT NOTE (OTHER) - ASSESSMENT
VSS. Trach site CDI. sutures appear to be too tight.
NGT dislodge on own while patient ambulated to restroom.

## 2023-05-15 NOTE — PROVIDER CONTACT NOTE (OTHER) - SITUATION
Upon morning assessment sutures from trach site appear to be too tight. Requesting the team come to bedside to assess the site and to place an off monitor for the patient to go to x-ray.

## 2023-05-15 NOTE — DIETITIAN INITIAL EVALUATION ADULT - ADD RECOMMEND
1. NPO with Xray Cinesophagram Swallow Function, Prior to PO Diet. Should pt pass: Regular diet with PO cons per SLP/Team.  2. However Should EN remain indicated as sole source of nutrition current TF order: Jevity 1.2 @75ml/hr x24hrs provides ~1800ml 2160kcal 100gm prot 1453ml water. Can consider adding 1LPS/day for additional 100kcal/15gm prot.   3. Monitor for feeding tolerance, GI distress.  >> Will adjust rate of EN PRN if PO feasible Pending %PO intake.  4. Pain/GI per team.  5. Monitor Skin, Wt, GOC.  6. RD to remain available for additional nutrition interventions as needed.   * High Nutrition Risk.

## 2023-05-15 NOTE — SWALLOW VFSS/MBS ASSESSMENT ADULT - DIAGNOSTIC IMPRESSIONS
Pt presents with functional gloria-pharyngeal swallow with adequate bolus clearance efficiency and preserved airway protection despite reduced hyolaryngeal elevation and excursion likely in presence of tracheostomy.

## 2023-05-15 NOTE — DIETITIAN INITIAL EVALUATION ADULT - PERTINENT LABORATORY DATA
05-15    139  |  101  |  12  ----------------------------<  107<H>  4.1   |  27  |  0.86    Ca    8.6      15 May 2023 05:30  Phos  3.9     05-15  Mg     2.0     05-15    POCT Blood Glucose.: 117 mg/dL (05-15-23 @ 11:33)  A1C with Estimated Average Glucose Result: 5.5 % (05-13-23 @ 05:30)

## 2023-05-15 NOTE — SWALLOW VFSS/MBS ASSESSMENT ADULT - SLP PERTINENT HISTORY OF CURRENT PROBLEM
s/p completion thyroidectomy c/b stridor and need for trach, B/L vocal fold paralysis in paramedian position

## 2023-05-15 NOTE — PROVIDER CONTACT NOTE (OTHER) - REASON
Off monitor needed and sutures appear to be tight around trach site.
NGT dislodge while patient in restroom.

## 2023-05-15 NOTE — SWALLOW VFSS/MBS ASSESSMENT ADULT - PHARYNGEAL PHASE COMMENTS
Swallow was triggered once bolus reached the valleculae. Hyolaryngeal elevation and excursion were reduced. Nevertheless, bolus clearance was efficient with no airway invasion.

## 2023-05-15 NOTE — DIETITIAN INITIAL EVALUATION ADULT - PERTINENT MEDS FT
MEDICATIONS  (STANDING):  ceFAZolin   IVPB 2000 milliGRAM(s) IV Intermittent every 8 hours  dextrose 5%. 1000 milliLiter(s) (50 mL/Hr) IV Continuous <Continuous>  dextrose 50% Injectable 25 Gram(s) IV Push once  glucagon  Injectable 1 milliGRAM(s) IntraMuscular once  heparin   Injectable 5000 Unit(s) SubCutaneous every 8 hours  insulin lispro (ADMELOG) corrective regimen sliding scale   SubCutaneous every 6 hours  levothyroxine Injectable 100 MICROGram(s) IV Push <User Schedule>  melatonin 5 milliGRAM(s) Oral at bedtime  niMODipine 30 milliGRAM(s) Oral <User Schedule>  pantoprazole  Injectable 40 milliGRAM(s) IV Push every 24 hours  polyethylene glycol 3350 17 Gram(s) Oral daily  senna Syrup 5 milliLiter(s) Oral at bedtime    MEDICATIONS  (PRN):  acetaminophen   Oral Liquid .. 650 milliGRAM(s) Oral every 4 hours PRN Mild Pain (1 - 3)  dextrose Oral Gel 15 Gram(s) Oral once PRN Blood Glucose LESS THAN 70 milliGRAM(s)/deciliter  HYDROmorphone  Injectable 0.5 milliGRAM(s) IV Push every 6 hours PRN breakthrough pain  labetalol Injectable 10 milliGRAM(s) IV Push every 6 hours PRN sbp> 160  LORazepam     Tablet 0.5 milliGRAM(s) Oral every 12 hours PRN Anxiety  oxyCODONE    Solution 5 milliGRAM(s) Oral every 4 hours PRN Moderate Pain (4 - 6)

## 2023-05-15 NOTE — DIETITIAN INITIAL EVALUATION ADULT - NSFNSGIIOFT_GEN_A_CORE
05-14-23 @ 07:01  -  05-15-23 @ 07:00  --------------------------------------------------------  OUT:    Stool (mL): 5 mL  Total OUT: 5 mL    Total NET: 435 mL

## 2023-05-15 NOTE — DIETITIAN INITIAL EVALUATION ADULT - OTHER CALCULATIONS
6'2''  pounds +-10%   Wt 190 BMI 24.5 %[id number]  current body wt used for energy calculations as pt falls within % IBW  adjust for age, s/p OR, Trach

## 2023-05-15 NOTE — PROVIDER CONTACT NOTE (OTHER) - RECOMMENDATIONS
VSS off monitor requested. Requested for team to cut sutures as they are too tight and may cause PI.

## 2023-05-16 LAB
ANION GAP SERPL CALC-SCNC: 13 MMOL/L — SIGNIFICANT CHANGE UP (ref 5–17)
BUN SERPL-MCNC: 14 MG/DL — SIGNIFICANT CHANGE UP (ref 7–23)
CALCIUM SERPL-MCNC: 7.7 MG/DL — LOW (ref 8.4–10.5)
CHLORIDE SERPL-SCNC: 100 MMOL/L — SIGNIFICANT CHANGE UP (ref 96–108)
CO2 SERPL-SCNC: 26 MMOL/L — SIGNIFICANT CHANGE UP (ref 22–31)
CREAT SERPL-MCNC: 0.91 MG/DL — SIGNIFICANT CHANGE UP (ref 0.5–1.3)
EGFR: 104 ML/MIN/1.73M2 — SIGNIFICANT CHANGE UP
GLUCOSE BLDC GLUCOMTR-MCNC: 103 MG/DL — HIGH (ref 70–99)
GLUCOSE BLDC GLUCOMTR-MCNC: 89 MG/DL — SIGNIFICANT CHANGE UP (ref 70–99)
GLUCOSE BLDC GLUCOMTR-MCNC: 91 MG/DL — SIGNIFICANT CHANGE UP (ref 70–99)
GLUCOSE BLDC GLUCOMTR-MCNC: 93 MG/DL — SIGNIFICANT CHANGE UP (ref 70–99)
GLUCOSE SERPL-MCNC: 104 MG/DL — HIGH (ref 70–99)
HCT VFR BLD CALC: 43.5 % — SIGNIFICANT CHANGE UP (ref 39–50)
HGB BLD-MCNC: 14.6 G/DL — SIGNIFICANT CHANGE UP (ref 13–17)
MAGNESIUM SERPL-MCNC: 2 MG/DL — SIGNIFICANT CHANGE UP (ref 1.6–2.6)
MCHC RBC-ENTMCNC: 30.1 PG — SIGNIFICANT CHANGE UP (ref 27–34)
MCHC RBC-ENTMCNC: 33.6 GM/DL — SIGNIFICANT CHANGE UP (ref 32–36)
MCV RBC AUTO: 89.7 FL — SIGNIFICANT CHANGE UP (ref 80–100)
NRBC # BLD: 0 /100 WBCS — SIGNIFICANT CHANGE UP (ref 0–0)
PHOSPHATE SERPL-MCNC: 3.7 MG/DL — SIGNIFICANT CHANGE UP (ref 2.5–4.5)
PLATELET # BLD AUTO: 313 K/UL — SIGNIFICANT CHANGE UP (ref 150–400)
POTASSIUM SERPL-MCNC: 3.6 MMOL/L — SIGNIFICANT CHANGE UP (ref 3.5–5.3)
POTASSIUM SERPL-SCNC: 3.6 MMOL/L — SIGNIFICANT CHANGE UP (ref 3.5–5.3)
RBC # BLD: 4.85 M/UL — SIGNIFICANT CHANGE UP (ref 4.2–5.8)
RBC # FLD: 13.4 % — SIGNIFICANT CHANGE UP (ref 10.3–14.5)
SODIUM SERPL-SCNC: 139 MMOL/L — SIGNIFICANT CHANGE UP (ref 135–145)
WBC # BLD: 7.68 K/UL — SIGNIFICANT CHANGE UP (ref 3.8–10.5)
WBC # FLD AUTO: 7.68 K/UL — SIGNIFICANT CHANGE UP (ref 3.8–10.5)

## 2023-05-16 RX ADMIN — NIMODIPINE 30 MILLIGRAM(S): 60 SOLUTION ORAL at 21:31

## 2023-05-16 RX ADMIN — Medication 100 MILLIGRAM(S): at 15:49

## 2023-05-16 RX ADMIN — SENNA PLUS 1 TABLET(S): 8.6 TABLET ORAL at 12:57

## 2023-05-16 RX ADMIN — Medication 100 MICROGRAM(S): at 05:25

## 2023-05-16 RX ADMIN — HEPARIN SODIUM 5000 UNIT(S): 5000 INJECTION INTRAVENOUS; SUBCUTANEOUS at 15:45

## 2023-05-16 RX ADMIN — OXYCODONE HYDROCHLORIDE 10 MILLIGRAM(S): 5 TABLET ORAL at 05:32

## 2023-05-16 RX ADMIN — PANTOPRAZOLE SODIUM 40 MILLIGRAM(S): 20 TABLET, DELAYED RELEASE ORAL at 15:45

## 2023-05-16 RX ADMIN — Medication 100 MILLIGRAM(S): at 21:31

## 2023-05-16 RX ADMIN — NIMODIPINE 30 MILLIGRAM(S): 60 SOLUTION ORAL at 15:45

## 2023-05-16 RX ADMIN — HEPARIN SODIUM 5000 UNIT(S): 5000 INJECTION INTRAVENOUS; SUBCUTANEOUS at 21:31

## 2023-05-16 RX ADMIN — HYDROMORPHONE HYDROCHLORIDE 0.5 MILLIGRAM(S): 2 INJECTION INTRAMUSCULAR; INTRAVENOUS; SUBCUTANEOUS at 13:34

## 2023-05-16 RX ADMIN — NIMODIPINE 30 MILLIGRAM(S): 60 SOLUTION ORAL at 05:25

## 2023-05-16 RX ADMIN — OXYCODONE HYDROCHLORIDE 10 MILLIGRAM(S): 5 TABLET ORAL at 06:16

## 2023-05-16 RX ADMIN — Medication 100 MILLIGRAM(S): at 05:24

## 2023-05-16 RX ADMIN — OXYCODONE HYDROCHLORIDE 5 MILLIGRAM(S): 5 TABLET ORAL at 20:37

## 2023-05-16 RX ADMIN — OXYCODONE HYDROCHLORIDE 5 MILLIGRAM(S): 5 TABLET ORAL at 21:40

## 2023-05-16 RX ADMIN — HEPARIN SODIUM 5000 UNIT(S): 5000 INJECTION INTRAVENOUS; SUBCUTANEOUS at 05:26

## 2023-05-17 LAB
ANION GAP SERPL CALC-SCNC: 11 MMOL/L — SIGNIFICANT CHANGE UP (ref 5–17)
BUN SERPL-MCNC: 10 MG/DL — SIGNIFICANT CHANGE UP (ref 7–23)
CALCIUM SERPL-MCNC: 8 MG/DL — LOW (ref 8.4–10.5)
CHLORIDE SERPL-SCNC: 100 MMOL/L — SIGNIFICANT CHANGE UP (ref 96–108)
CO2 SERPL-SCNC: 26 MMOL/L — SIGNIFICANT CHANGE UP (ref 22–31)
CREAT SERPL-MCNC: 0.82 MG/DL — SIGNIFICANT CHANGE UP (ref 0.5–1.3)
EGFR: 108 ML/MIN/1.73M2 — SIGNIFICANT CHANGE UP
GLUCOSE BLDC GLUCOMTR-MCNC: 112 MG/DL — HIGH (ref 70–99)
GLUCOSE BLDC GLUCOMTR-MCNC: 112 MG/DL — HIGH (ref 70–99)
GLUCOSE BLDC GLUCOMTR-MCNC: 95 MG/DL — SIGNIFICANT CHANGE UP (ref 70–99)
GLUCOSE BLDC GLUCOMTR-MCNC: 95 MG/DL — SIGNIFICANT CHANGE UP (ref 70–99)
GLUCOSE SERPL-MCNC: 112 MG/DL — HIGH (ref 70–99)
HCT VFR BLD CALC: 43.8 % — SIGNIFICANT CHANGE UP (ref 39–50)
HGB BLD-MCNC: 14.6 G/DL — SIGNIFICANT CHANGE UP (ref 13–17)
MAGNESIUM SERPL-MCNC: 2 MG/DL — SIGNIFICANT CHANGE UP (ref 1.6–2.6)
MCHC RBC-ENTMCNC: 29.6 PG — SIGNIFICANT CHANGE UP (ref 27–34)
MCHC RBC-ENTMCNC: 33.3 GM/DL — SIGNIFICANT CHANGE UP (ref 32–36)
MCV RBC AUTO: 88.7 FL — SIGNIFICANT CHANGE UP (ref 80–100)
NRBC # BLD: 0 /100 WBCS — SIGNIFICANT CHANGE UP (ref 0–0)
PHOSPHATE SERPL-MCNC: 3.2 MG/DL — SIGNIFICANT CHANGE UP (ref 2.5–4.5)
PLATELET # BLD AUTO: 347 K/UL — SIGNIFICANT CHANGE UP (ref 150–400)
POTASSIUM SERPL-MCNC: 3.4 MMOL/L — LOW (ref 3.5–5.3)
POTASSIUM SERPL-SCNC: 3.4 MMOL/L — LOW (ref 3.5–5.3)
RBC # BLD: 4.94 M/UL — SIGNIFICANT CHANGE UP (ref 4.2–5.8)
RBC # FLD: 13 % — SIGNIFICANT CHANGE UP (ref 10.3–14.5)
SODIUM SERPL-SCNC: 137 MMOL/L — SIGNIFICANT CHANGE UP (ref 135–145)
WBC # BLD: 8.35 K/UL — SIGNIFICANT CHANGE UP (ref 3.8–10.5)
WBC # FLD AUTO: 8.35 K/UL — SIGNIFICANT CHANGE UP (ref 3.8–10.5)

## 2023-05-17 PROCEDURE — 31502 CHANGE OF WINDPIPE AIRWAY: CPT

## 2023-05-17 RX ADMIN — OXYCODONE HYDROCHLORIDE 10 MILLIGRAM(S): 5 TABLET ORAL at 05:28

## 2023-05-17 RX ADMIN — HEPARIN SODIUM 5000 UNIT(S): 5000 INJECTION INTRAVENOUS; SUBCUTANEOUS at 05:27

## 2023-05-17 RX ADMIN — OXYCODONE HYDROCHLORIDE 10 MILLIGRAM(S): 5 TABLET ORAL at 06:14

## 2023-05-17 RX ADMIN — Medication 100 MILLIGRAM(S): at 15:39

## 2023-05-17 RX ADMIN — Medication 0.5 MILLIGRAM(S): at 19:26

## 2023-05-17 RX ADMIN — Medication 100 MILLIGRAM(S): at 22:04

## 2023-05-17 RX ADMIN — Medication 100 MILLIGRAM(S): at 05:27

## 2023-05-17 RX ADMIN — Medication 100 MICROGRAM(S): at 05:28

## 2023-05-17 RX ADMIN — SENNA PLUS 1 TABLET(S): 8.6 TABLET ORAL at 12:03

## 2023-05-17 RX ADMIN — OXYCODONE HYDROCHLORIDE 10 MILLIGRAM(S): 5 TABLET ORAL at 23:50

## 2023-05-17 RX ADMIN — NIMODIPINE 30 MILLIGRAM(S): 60 SOLUTION ORAL at 14:58

## 2023-05-17 RX ADMIN — OXYCODONE HYDROCHLORIDE 10 MILLIGRAM(S): 5 TABLET ORAL at 23:16

## 2023-05-17 RX ADMIN — NIMODIPINE 30 MILLIGRAM(S): 60 SOLUTION ORAL at 05:28

## 2023-05-17 RX ADMIN — HEPARIN SODIUM 5000 UNIT(S): 5000 INJECTION INTRAVENOUS; SUBCUTANEOUS at 14:59

## 2023-05-17 RX ADMIN — OXYCODONE HYDROCHLORIDE 10 MILLIGRAM(S): 5 TABLET ORAL at 12:02

## 2023-05-17 RX ADMIN — NIMODIPINE 30 MILLIGRAM(S): 60 SOLUTION ORAL at 22:02

## 2023-05-17 RX ADMIN — PANTOPRAZOLE SODIUM 40 MILLIGRAM(S): 20 TABLET, DELAYED RELEASE ORAL at 14:59

## 2023-05-17 RX ADMIN — OXYCODONE HYDROCHLORIDE 5 MILLIGRAM(S): 5 TABLET ORAL at 09:28

## 2023-05-17 RX ADMIN — OXYCODONE HYDROCHLORIDE 5 MILLIGRAM(S): 5 TABLET ORAL at 08:58

## 2023-05-17 RX ADMIN — OXYCODONE HYDROCHLORIDE 10 MILLIGRAM(S): 5 TABLET ORAL at 12:32

## 2023-05-17 RX ADMIN — HEPARIN SODIUM 5000 UNIT(S): 5000 INJECTION INTRAVENOUS; SUBCUTANEOUS at 22:01

## 2023-05-17 NOTE — SPEAKING VALVE EVALUATION - SLP PERTINENT HISTORY OF CURRENT PROBLEM
PMHx of 30 pack/year smoking hx, GERD, and s/p mediastinal R hemithyroidectomy via hemisternotomy and R VATS on 8/25/20, final path thyroid with adenomatoid nodules and benign lymph nodes. Procedure c/b persistent R TVC paralysis and intermittent dysphagia. Pt noted to have L thyroid nodule, increased in size from prior imaging. Decision was made to proceed with L completion thyroidectomy. Pt admitted to [facility] on 5/12/23 and now s/p L completion thyroidectomy under the direction of Dr. Gray. Immediately post op, Pt developed stridor and respiratory distress with suspected L VC praxis in addition to known R VC paralysis. Pt reintubated and surgical tracheostomy performed (6CN). Op findings: left nodular goiter, recurrent laryngeal grossly intact on the left with decreased stimulation, gross identification of the parathyroids.

## 2023-05-17 NOTE — SPEAKING VALVE EVALUATION - ADDITIONAL COMMENTS
PMV precautions reviewed:   1) Do not wear speaking valve while sleeping  2) Remove speaking valve with signs/symptoms of respiratory distress  Reviewed cleaning of speaking valve  Practiced donning/doffing speaking valve. Pt independently placed and removed speaking valve by end of session.

## 2023-05-17 NOTE — SPEAKING VALVE EVALUATION - SLP GENERAL OBSERVATIONS
Pt was seen fully awake and alert, HOB fully elevated, on 6L of O2 via trach collar (Shiley sz 6, cuffless). Leak voicing achieved on open trach. Pt followed simple directives and communicated wants/needs.

## 2023-05-17 NOTE — SPEAKING VALVE EVALUATION - REMOVE VALVE FOR
SpO2 < 92%/Increased work of breathing/Evidence of air trapping/Excessive coughing/Diaphoresis/Sleep/Subjective complaints/Aerosolized respiratory treatments

## 2023-05-17 NOTE — SPEAKING VALVE EVALUATION - RECOMMENDED SPEAKING VALVE GUIDELINES
Placement of speaking valve as tolerated/During waking hours only/Place on hub of tracheostomy... Placement of speaking valve as tolerated/During waking hours only

## 2023-05-17 NOTE — SPEAKING VALVE EVALUATION - COMMENTS
Pt known to service and followed for dysphagia management. MBSS completed on 5/15/23 revealed a functional oropharyngeal swallow.

## 2023-05-17 NOTE — SPEAKING VALVE EVALUATION - DIAGNOSTIC IMPRESSIONS
Aphonia s/p tracheostomy placement. Voicing achieved on open trach, improved significantly with speaking valve use. Moderate dysphonia noted characterized primarily by weak and soft vocal quality, though with effort adequate vocal loudness achieved for functional communication. No stridor or strained vocal quality noted. Pt comfortably communicated wants/needs. No signs/symptoms of respiratory distress noted with speaking valve use. Pt independently donned and doffed speaking valve. Presentation consistent with known bilateral VC paralysis in the paramedian position. Despite VC position, Pt tolerated speaking valve use for functional communication. Speaking valve precautions reviewed.

## 2023-05-17 NOTE — PROCEDURE NOTE - ADDITIONAL PROCEDURE DETAILS
PROCEDURE NOTE:     Procedure: Tracheostomy exchange prior to maturation of tract (CPT 59480)      Pre-Procedure Diagnosis: , tracheostomy dependence  Post-Procedure Diagnosis:  tracheostomy dependence     Indications for Procedure:  is a KEVAN VIRAMONTES 47yMalewho presented with intrathoracic goiter, status post tracheostomy on 5/10. See above full HPI for further details. On POD 7, a tracheostomy exchange was required to ensure the stoma and tract were healing appropriately and to change patient to cuffless tracheostomy.     Description of Procedure: After obtaining verbal consent, the patient was positioned with shoulder roll supine. Flexible suctioning was performed to clear the secretions. The existing 6CN75R tracheostomy was checked and ready with obturator. I did remove the 4 silk sutures. Next, the velcroe tie was removed and the tracheostomy tube was removed. The stoma was well healed. The tract appeared well formed without granulation or collapse. I did clear the skin with saline, and place an Allevyn dressing inferior to the stoma. I did replace the new 6UN75R tracheostomy without difficulty and secured with velcroe trach tie. The patient tolerated the procedure well without complications.     Findings:  - Well formed stoma and tract  - Easy, uncomplicated placement of 6UN75R (removed 6CN75R)

## 2023-05-17 NOTE — SPEAKING VALVE EVALUATION - PHONATION DURING VALVE TRIAL
weak and soft vocal quality, though with effort adequate vocal loudness achieved for functional communication. No stridor or strained vocal quality noted. Pt comfortably communicated wants/needs. No increased in RR. No signs/symptoms of respiratory distress./dysphonic

## 2023-05-17 NOTE — SPEAKING VALVE EVALUATION - RECOMMENDATIONS
1) Continue speaking valve use as tolerated  2) Speaking valve (PMV) precautions reviewed and listed above  3) This service to follow up as needed

## 2023-05-18 ENCOUNTER — TRANSCRIPTION ENCOUNTER (OUTPATIENT)
Age: 48
End: 2023-05-18

## 2023-05-18 VITALS — TEMPERATURE: 98 F

## 2023-05-18 LAB
GLUCOSE BLDC GLUCOMTR-MCNC: 68 MG/DL — LOW (ref 70–99)
GLUCOSE BLDC GLUCOMTR-MCNC: 83 MG/DL — SIGNIFICANT CHANGE UP (ref 70–99)
GLUCOSE BLDC GLUCOMTR-MCNC: 92 MG/DL — SIGNIFICANT CHANGE UP (ref 70–99)
GLUCOSE BLDC GLUCOMTR-MCNC: 93 MG/DL — SIGNIFICANT CHANGE UP (ref 70–99)

## 2023-05-18 PROCEDURE — 92597 ORAL SPEECH DEVICE EVAL: CPT

## 2023-05-18 PROCEDURE — 97116 GAIT TRAINING THERAPY: CPT

## 2023-05-18 PROCEDURE — 80053 COMPREHEN METABOLIC PANEL: CPT

## 2023-05-18 PROCEDURE — 82962 GLUCOSE BLOOD TEST: CPT

## 2023-05-18 PROCEDURE — 92610 EVALUATE SWALLOWING FUNCTION: CPT

## 2023-05-18 PROCEDURE — 84436 ASSAY OF TOTAL THYROXINE: CPT

## 2023-05-18 PROCEDURE — 86901 BLOOD TYPING SEROLOGIC RH(D): CPT

## 2023-05-18 PROCEDURE — 85027 COMPLETE CBC AUTOMATED: CPT

## 2023-05-18 PROCEDURE — 80048 BASIC METABOLIC PNL TOTAL CA: CPT

## 2023-05-18 PROCEDURE — 74230 X-RAY XM SWLNG FUNCJ C+: CPT

## 2023-05-18 PROCEDURE — 83735 ASSAY OF MAGNESIUM: CPT

## 2023-05-18 PROCEDURE — 92611 MOTION FLUOROSCOPY/SWALLOW: CPT

## 2023-05-18 PROCEDURE — L8699: CPT

## 2023-05-18 PROCEDURE — 97161 PT EVAL LOW COMPLEX 20 MIN: CPT

## 2023-05-18 PROCEDURE — 83970 ASSAY OF PARATHORMONE: CPT

## 2023-05-18 PROCEDURE — 85610 PROTHROMBIN TIME: CPT

## 2023-05-18 PROCEDURE — 71045 X-RAY EXAM CHEST 1 VIEW: CPT

## 2023-05-18 PROCEDURE — 85730 THROMBOPLASTIN TIME PARTIAL: CPT

## 2023-05-18 PROCEDURE — 84100 ASSAY OF PHOSPHORUS: CPT

## 2023-05-18 PROCEDURE — 36415 COLL VENOUS BLD VENIPUNCTURE: CPT

## 2023-05-18 PROCEDURE — 86850 RBC ANTIBODY SCREEN: CPT

## 2023-05-18 PROCEDURE — C1889: CPT

## 2023-05-18 PROCEDURE — 83036 HEMOGLOBIN GLYCOSYLATED A1C: CPT

## 2023-05-18 PROCEDURE — 86900 BLOOD TYPING SEROLOGIC ABO: CPT

## 2023-05-18 PROCEDURE — 88307 TISSUE EXAM BY PATHOLOGIST: CPT

## 2023-05-18 PROCEDURE — 84443 ASSAY THYROID STIM HORMONE: CPT

## 2023-05-18 PROCEDURE — 85025 COMPLETE CBC W/AUTO DIFF WBC: CPT

## 2023-05-18 PROCEDURE — 92507 TX SP LANG VOICE COMM INDIV: CPT

## 2023-05-18 RX ORDER — LEVOTHYROXINE SODIUM 125 MCG
1 TABLET ORAL
Qty: 30 | Refills: 1
Start: 2023-05-18 | End: 2023-07-16

## 2023-05-18 RX ORDER — NIMODIPINE 60 MG/10ML
2 SOLUTION ORAL
Qty: 504 | Refills: 0
Start: 2023-05-18 | End: 2023-08-09

## 2023-05-18 RX ADMIN — Medication 100 MILLIGRAM(S): at 15:51

## 2023-05-18 RX ADMIN — Medication 100 MILLIGRAM(S): at 05:50

## 2023-05-18 RX ADMIN — HEPARIN SODIUM 5000 UNIT(S): 5000 INJECTION INTRAVENOUS; SUBCUTANEOUS at 05:50

## 2023-05-18 RX ADMIN — HEPARIN SODIUM 5000 UNIT(S): 5000 INJECTION INTRAVENOUS; SUBCUTANEOUS at 15:51

## 2023-05-18 RX ADMIN — POLYETHYLENE GLYCOL 3350 17 GRAM(S): 17 POWDER, FOR SOLUTION ORAL at 12:42

## 2023-05-18 RX ADMIN — Medication 100 MICROGRAM(S): at 05:55

## 2023-05-18 RX ADMIN — NIMODIPINE 30 MILLIGRAM(S): 60 SOLUTION ORAL at 15:52

## 2023-05-18 RX ADMIN — Medication 0.5 MILLIGRAM(S): at 07:50

## 2023-05-18 RX ADMIN — NIMODIPINE 30 MILLIGRAM(S): 60 SOLUTION ORAL at 05:50

## 2023-05-18 RX ADMIN — SENNA PLUS 1 TABLET(S): 8.6 TABLET ORAL at 12:42

## 2023-05-18 NOTE — DISCHARGE NOTE PROVIDER - CARE PROVIDER_API CALL
Edgardo Gray)  Otolaryngology  [address] [place] [address], [address] [place] [address]  Phone: [phone]  Fax: [phone]  Follow Up Time:

## 2023-05-18 NOTE — DISCHARGE NOTE PROVIDER - NSDCFUADDINST_GEN_ALL_CORE_FT
Continue Nimodipine and levothyroxine as prescribed.    Your tracheostomy supplies are being delivered to your home.    Activity: No heavy lifting or strenuous activity until your follow-up appointment. Walk as tolerated.     Wound Care: You may shower. No baths, hot tubs or swimming until approved by your surgeon.     Follow up after discharge. Call office for appointment.    Call office for fever >101.5 or redness or drainage from wound.      Continue Nimodipine and levothyroxine as prescribed.    Your tracheostomy supplies are being delivered to your home.    Activity: No heavy lifting or strenuous activity until your follow-up appointment. Walk as tolerated.     Wound Care: You may shower. No baths, hot tubs or swimming until approved by your surgeon.     Follow up after discharge on Wed. Call the office to confirm.     Call office for fever >101.5 or redness or drainage from wound.

## 2023-05-18 NOTE — PROVIDER CONTACT NOTE (HYPOGLYCEMIA EVENT) - NS PROVIDER CONTACT BACKGROUND-HYPO
Age: 47y    Gender: Male    POCT Blood Glucose:  92 mg/dL (05-18-23 @ 08:02)  68 mg/dL (05-18-23 @ 06:12)  112 mg/dL (05-17-23 @ 23:39)  95 mg/dL (05-17-23 @ 17:28)  95 mg/dL (05-17-23 @ 11:33)      eMAR:  levothyroxine   100 MICROGram(s) Oral (05-18-23 @ 05:55)    MD aware about pt being hypoglycemic and apple juice given; will retake pt fs

## 2023-05-18 NOTE — PROGRESS NOTE ADULT - SUBJECTIVE AND OBJECTIVE BOX
OTOLARYNGOLOGY (ENT) PROGRESS NOTE    PATIENT: KEVAN VIRAMONTES  MRN: [id number]  : [birth year]  [id number]  DATE OF SERVICE:  23  			           ID:KEVAN VIRAMONTES is a  47yMale with 30 pack year smoking history, s/p 20 right hemithyroidectomy via neck + right thorax (robotic) + sternotomy (Inra), final path thyroid with adenomatoid nodules and benign lymph nodes. Procedure c/b persistent R TVC paralysis and intermittent dysphagia. Pt noted to have L thyroid nodule, increased in size from prior imaging. Decision was made to proceed with L completion thyroidectomy and possible tracheostomy (6CN). Pt now s/p L completion thyroidectomy and tracheostomy.     Subjective/ Interval: Patient seen and examined at bedside. Patient doing well overnight. Cuff deflated on rounds this morning. Minimally able to phonate following cuff deflation. Tolerated 2-3 sips of clears.   23: Patient seen and examined at bedside. Patient stepped down from SICU yesterday night. SLP evaluated patient yesterday and recommends MBS on Monday.  5/15/23: Patient seen and examined at bedside. He was evaluated by SLP yesterday, who recommended barium swallow today. Tube feeds were advanced to goal.  : Patient seen and examined at bedside. He had MBS performed yesterday and was advanced to regular diet, which he is tolerating well.   Patient seen at bedside and discussed with attending. No issues overnight. No new complaints this morning.  :  Patient seen bedside, continues to do trach care,  pending supplies, pain controlled, tolerating diet and PMV       ALLERGIES:  shellfish (Anaphylaxis)  No Known Drug Allergies      MEDICATIONS:  Antiinfectives:   ceFAZolin   IVPB 2000 milliGRAM(s) IV Intermittent every 8 hours    IV fluids:  dextrose 5%. 1000 milliLiter(s) IV Continuous <Continuous>    Hematologic/Anticoagulation:  heparin   Injectable 5000 Unit(s) SubCutaneous every 8 hours    Pain medications/Neuro:  acetaminophen     Tablet .. 650 milliGRAM(s) Oral every 6 hours PRN  LORazepam     Tablet 0.5 milliGRAM(s) Oral every 12 hours PRN  melatonin 1 milliGRAM(s) Oral at bedtime PRN  oxyCODONE    IR 10 milliGRAM(s) Oral every 6 hours PRN  oxyCODONE    IR 5 milliGRAM(s) Oral every 4 hours PRN    Endocrine Medications:   dextrose 50% Injectable 25 Gram(s) IV Push once  dextrose Oral Gel 15 Gram(s) Oral once PRN  glucagon  Injectable 1 milliGRAM(s) IntraMuscular once  insulin lispro (ADMELOG) corrective regimen sliding scale   SubCutaneous every 6 hours  levothyroxine 100 MICROGram(s) Oral daily    All other standing medications:   niMODipine 30 milliGRAM(s) Oral <User Schedule>  pantoprazole  Injectable 40 milliGRAM(s) IV Push every 24 hours  polyethylene glycol 3350 17 Gram(s) Oral daily  senna 1 Tablet(s) Oral daily    All other PRN medications:    Vital Signs Last 24 Hrs  T(C): 37.3 (18 May 2023 05:00), Max: 37.3 (17 May 2023 21:54)  T(F): 99.1 (18 May 2023 05:00), Max: 99.2 (17 May 2023 21:54)  HR: 94 (18 May 2023 04:14) (82 - 96)  BP: 129/90 (18 May 2023 04:14) (100/57 - 135/78)  BP(mean): 105 (18 May 2023 04:14) (71 - 105)  RR: 17 (18 May 2023 04:14) (17 - 20)  SpO2: 100% (18 May 2023 04:14) (96% - 100%)    Parameters below as of 18 May 2023 04:14  Patient On (Oxygen Delivery Method): tracheostomy collar  O2 Flow (L/min): 8  O2 Concentration (%): 35      05-17 @ 07:01  -  05-18 @ 07:00  --------------------------------------------------------  IN:    Oral Fluid: 440 mL  Total IN: 440 mL    OUT:    Stool (mL): 1 mL    Voided (mL): 475 mL  Total OUT: 476 mL    Total NET: -36 mL        PHYSICAL EXAM:    General: NAD, AOx3  Respiratory: 6UN trach in place on trach collar, cuff deflated, secured with sutures and trach ties, with mild secretions, thyroid sutures remain   Face:  Symmetric without masses or lesions  OU: EOMI  OC/OP: tongue normal, floor of mouth WNL, no masses or lesions, OP clear  Neck: soft/flat, 6UN trach in place, lateral thyroidectomy incisions        LABS                       14.6   8.35  )-----------( 347      ( 17 May 2023 05:30 )             43.8    -    137  |  100  |  10  ----------------------------<  112<H>  3.4<L>   |  26  |  0.82    Ca    8.0<L>      17 May 2023 05:30  Phos  3.2       Mg     2.0            
OTOLARYNGOLOGY (ENT) PROGRESS NOTE    PATIENT: KEVAN VIRAMONTES  MRN: [id number]  : [birth year]  [id number]  DATE OF SERVICE:  23  			         ID:KEVAN VIRAMONTES is a  47yMale with 30 pack year smoking history, s/p 20 right hemithyroidectomy via neck + right thorax (robotic) + sternotomy (Inra), final path thyroid with adenomatoid nodules and benign lymph nodes. Procedure c/b persistent R TVC paralysis and intermittent dysphagia. Pt noted to have L thyroid nodule, increased in size from prior imaging. Decision was made to proceed with L completion thyroidectomy and possible tracheostomy (6CN). Pt now s/p L completion thyroidectomy and tracheostomy.     Subjective/ Interval: Patient seen and examined at bedside. Patient doing well overnight. Cuff deflated on rounds this morning. Minimally able to phonate following cuff deflation. Tolerated 2-3 sips of clears.   23: Patient seen and examined at bedside. Patient stepped down from SICU yesterday night. SLP evaluated patient yesterday and recommends MBS on Monday.  5/15/23: Patient seen and examined at bedside. He was evaluated by SLP yesterday, who recommended barium swallow today. Tube feeds were advanced to goal.  : Patient seen and examined at bedside. He had MBS performed yesterday and was advanced to regular diet, which he is tolerating well.    MEDICATIONS  (STANDING):  ceFAZolin   IVPB 2000 milliGRAM(s) IV Intermittent every 8 hours  dextrose 5%. 1000 milliLiter(s) (50 mL/Hr) IV Continuous <Continuous>  dextrose 50% Injectable 25 Gram(s) IV Push once  glucagon  Injectable 1 milliGRAM(s) IntraMuscular once  heparin   Injectable 5000 Unit(s) SubCutaneous every 8 hours  insulin lispro (ADMELOG) corrective regimen sliding scale   SubCutaneous every 6 hours  levothyroxine 100 MICROGram(s) Oral daily  niMODipine 30 milliGRAM(s) Oral <User Schedule>  pantoprazole  Injectable 40 milliGRAM(s) IV Push every 24 hours  polyethylene glycol 3350 17 Gram(s) Oral daily  senna 1 Tablet(s) Oral daily    MEDICATIONS  (PRN):  acetaminophen     Tablet .. 650 milliGRAM(s) Oral every 6 hours PRN Mild Pain (1 - 3)  dextrose Oral Gel 15 Gram(s) Oral once PRN Blood Glucose LESS THAN 70 milliGRAM(s)/deciliter  LORazepam     Tablet 0.5 milliGRAM(s) Oral every 12 hours PRN Anxiety  melatonin 1 milliGRAM(s) Oral at bedtime PRN Insomnia  oxyCODONE    IR 10 milliGRAM(s) Oral every 6 hours PRN Severe Pain (7 - 10)  oxyCODONE    IR 5 milliGRAM(s) Oral every 4 hours PRN Moderate Pain (4 - 6)      Vital Signs Last 24 Hrs  T(C): 36.9 (16 May 2023 05:00), Max: 37.7 (15 May 2023 21:50)  T(F): 98.5 (16 May 2023 05:00), Max: 99.8 (15 May 2023 21:50)  HR: 84 (16 May 2023 03:58) (82 - 94)  BP: 136/92 (16 May 2023 03:58) (112/61 - 141/87)  BP(mean): 107 (16 May 2023 03:58) (78 - 108)  ABP: --  ABP(mean): --  RR: 17 (16 May 2023 03:58) (17 - 18)  SpO2: 100% (16 May 2023 03:58) (97% - 100%)    O2 Parameters below as of 16 May 2023 03:58  Patient On (Oxygen Delivery Method): tracheostomy collar  O2 Flow (L/min): 10  O2 Concentration (%): 35        I&O's Detail    15 May 2023 07:01  -  16 May 2023 07:00  --------------------------------------------------------  IN:  Total IN: 0 mL    OUT:    Stool (mL): 2 mL    Voided (mL): 600 mL  Total OUT: 602 mL    Total NET: -602 mL                      PHYSICAL EXAM:  General: NAD, AOx3  Respiratory: 6CN trach in place on trach collar, cuff deflated, secured with sutures and trach ties, with moderate secretions  Face:  Symmetric without masses or lesions  OU: EOMI  OC/OP: tongue normal, floor of mouth WNL, no masses or lesions, OP clear  Neck: soft/flat, 6CN trach in place, lateral thyroidectomy incisions        .  LABS:                         14.6   7.68  )-----------( 313      ( 16 May 2023 05:30 )             43.5     -16    139  |  100  |  14  ----------------------------<  104<H>  3.6   |  26  |  0.91    Ca    7.7<L>      16 May 2023 05:30  Phos  3.7     16  Mg     2.0     16                    RADIOLOGY, EKG & ADDITIONAL TESTS: Reviewed. 
OTOLARYNGOLOGY (ENT) PROGRESS NOTE    PATIENT: KEVAN VIRAMONTES  MRN: [id number]  : [birth year]  [id number]  DATE OF SERVICE:  05-15-23  			         ID:KEVAN VIRAMONTES is a  47yMale with 30 pack year smoking history, s/p 20 right hemithyroidectomy via neck + right thorax (robotic) + sternotomy (Inra), final path thyroid with adenomatoid nodules and benign lymph nodes. Procedure c/b persistent R TVC paralysis and intermittent dysphagia. Pt noted to have L thyroid nodule, increased in size from prior imaging. Decision was made to proceed with L completion thyroidectomy and possible tracheostomy (6CN). Pt now s/p L completion thyroidectomy and tracheostomy.     Subjective/ Interval: Patient seen and examined at bedside. Patient doing well overnight. Cuff deflated on rounds this morning. Minimally able to phonate following cuff deflation. Tolerated 2-3 sips of clears.   23: Patient seen and examined at bedside. Patient stepped down from SICU yesterday night. SLP evaluated patient yesterday and recommends MBS on Monday.  5/15/23: Patient seen and examined at bedside. He was evaluated by SLP yesterday, who recommended barium swallow today. Tube feeds were advanced to goal.    MEDICATIONS  (STANDING):  ceFAZolin   IVPB 2000 milliGRAM(s) IV Intermittent every 8 hours  dextrose 5%. 1000 milliLiter(s) (50 mL/Hr) IV Continuous <Continuous>  dextrose 50% Injectable 25 Gram(s) IV Push once  glucagon  Injectable 1 milliGRAM(s) IntraMuscular once  heparin   Injectable 5000 Unit(s) SubCutaneous every 8 hours  insulin lispro (ADMELOG) corrective regimen sliding scale   SubCutaneous every 6 hours  levothyroxine Injectable 100 MICROGram(s) IV Push <User Schedule>  melatonin 5 milliGRAM(s) Oral at bedtime  niMODipine 30 milliGRAM(s) Oral <User Schedule>  pantoprazole  Injectable 40 milliGRAM(s) IV Push every 24 hours  polyethylene glycol 3350 17 Gram(s) Oral daily  senna Syrup 5 milliLiter(s) Oral at bedtime    MEDICATIONS  (PRN):  acetaminophen   Oral Liquid .. 650 milliGRAM(s) Oral every 4 hours PRN Mild Pain (1 - 3)  dextrose Oral Gel 15 Gram(s) Oral once PRN Blood Glucose LESS THAN 70 milliGRAM(s)/deciliter  labetalol Injectable 10 milliGRAM(s) IV Push every 6 hours PRN sbp> 160  LORazepam     Tablet 0.5 milliGRAM(s) Oral every 12 hours PRN Anxiety  oxyCODONE    Solution 7.5 milliGRAM(s) Oral every 4 hours PRN Severe Pain (7 - 10)  oxyCODONE    Solution 5 milliGRAM(s) Oral every 4 hours PRN Moderate Pain (4 - 6)    Vital Signs Last 24 Hrs  T(C): 36.9 (15 May 2023 04:15), Max: 36.9 (15 May 2023 04:15)  T(F): 98.4 (15 May 2023 04:15), Max: 98.4 (15 May 2023 04:15)  HR: 84 (15 May 2023 03:52) (80 - 110)  BP: 135/85 (15 May 2023 03:52) (132/84 - 167/85)  BP(mean): 105 (15 May 2023 03:52) (102 - 118)  RR: 18 (15 May 2023 03:52) (18 - 21)  SpO2: 98% (15 May 2023 03:52) (93% - 100%)    Parameters below as of 15 May 2023 03:52  Patient On (Oxygen Delivery Method): tracheostomy collar    O2 Concentration (%): 35    I&O's Detail    14 May 2023 07:01  -  15 May 2023 07:00  --------------------------------------------------------  IN:    IV PiggyBack: 150 mL    Jevity 1.2: 440 mL    Lactated Ringers: 360 mL  Total IN: 950 mL    OUT:    Stool (mL): 5 mL    Voided (mL): 375 mL  Total OUT: 380 mL    Total NET: 570 mL                PHYSICAL EXAM:  General: NAD, AOx3  Respiratory: 6CN trach in place on trach collar, cuff deflated, secured with sutures and trach ties, with moderat esecretions  Face:  Symmetric without masses or lesions  OU: EOMI  OC/OP: tongue normal, floor of mouth WNL, no masses or lesions, OP clear  Neck: soft/flat, 6CN trach in place, lateral thyroidectomy incisions          LABS:                         15.3   11.32 )-----------( 304      ( 15 May 2023 05:30 )             45.8     05-15    139  |  101  |  12  ----------------------------<  107<H>  4.1   |  27  |  0.86    Ca    8.6      15 May 2023 05:30  Phos  3.9     -15  Mg     2.0     15                    RADIOLOGY, EKG & ADDITIONAL TESTS: Reviewed. 
OTOLARYNGOLOGY (ENT) PROGRESS NOTE    PATIENT: KEVAN VIRAMONTES  MRN: [id number]  : [birth year]  [id number]  DATE OF SERVICE:  23  			         ID:KEVAN VIRAMONTES is a  47yMale with 30 pack year smoking history, s/p 20 right hemithyroidectomy via neck + right thorax (robotic) + sternotomy (Inra), final path thyroid with adenomatoid nodules and benign lymph nodes. Procedure c/b persistent R TVC paralysis and intermittent dysphagia. Pt noted to have L thyroid nodule, increased in size from prior imaging. Decision was made to proceed with L completion thyroidectomy and possible tracheostomy (6CN). Pt now s/p L completion thyroidectomy and tracheostomy.     Subjective/ Interval: Patient seen and examined at bedside. Patient doing well overnight. Cuff deflated on rounds this morning. Minimally able to phonate following cuff deflation. Tolerated 2-3 sips of clears.     ALLERGIES:  shellfish (Anaphylaxis)  No Known Drug Allergies      MEDICATIONS:  Antiinfectives:   ceFAZolin   IVPB 2000 milliGRAM(s) IV Intermittent every 8 hours    IV fluids:  dextrose 5%. 1000 milliLiter(s) IV Continuous <Continuous>  lactated ringers. 1000 milliLiter(s) IV Continuous <Continuous>    Hematologic/Anticoagulation:  heparin   Injectable 5000 Unit(s) SubCutaneous every 8 hours    Pain medications/Neuro:  HYDROmorphone  Injectable 0.5 milliGRAM(s) IV Push every 3 hours PRN  HYDROmorphone  Injectable 0.25 milliGRAM(s) IV Push every 3 hours PRN    Endocrine Medications:   dexAMETHasone  IVPB 8 milliGRAM(s) IV Intermittent every 8 hours  dextrose 50% Injectable 25 Gram(s) IV Push once  dextrose Oral Gel 15 Gram(s) Oral once PRN  glucagon  Injectable 1 milliGRAM(s) IntraMuscular once  insulin lispro (ADMELOG) corrective regimen sliding scale   SubCutaneous every 6 hours  levothyroxine Injectable 100 MICROGram(s) IV Push <User Schedule>    All other standing medications:   chlorhexidine 2% Cloths 1 Application(s) Topical <User Schedule>  pantoprazole  Injectable 40 milliGRAM(s) IV Push every 24 hours    All other PRN medications:  labetalol Injectable 10 milliGRAM(s) IV Push every 6 hours PRN    Vital Signs Last 24 Hrs  T(C): 37.8 (13 May 2023 09:00), Max: 37.8 (13 May 2023 09:00)  T(F): 100 (13 May 2023 09:00), Max: 100 (13 May 2023 09:00)  HR: 84 (13 May 2023 10:00) (66 - 94)  BP: 160/100 (13 May 2023 10:00) (132/90 - 190/96)  BP(mean): 125 (13 May 2023 10:00) (11 - 136)  RR: 18 (13 May 2023 10:00) (8 - 29)  SpO2: 100% (13 May 2023 10:00) (95% - 100%)    Parameters below as of 13 May 2023 08:20  Patient On (Oxygen Delivery Method): tracheostomy collar  O2 Flow (L/min): 8  O2 Concentration (%): 35      05-12 @ 07:01  -   @ 07:00  --------------------------------------------------------  IN:    IV PiggyBack: 200 mL    Lactated Ringers: 1560 mL  Total IN: 1760 mL    OUT:    Stool (mL): 2 mL    Voided (mL): 950 mL  Total OUT: 952 mL    Total NET: 808 mL       @ 07:01  -  13 @ 10:13  --------------------------------------------------------  IN:    Lactated Ringers: 240 mL  Total IN: 240 mL    OUT:  Total OUT: 0 mL    Total NET: 240 mL                  PHYSICAL EXAM:  General: NAD, AOx3  Respiratory: 6CN trach in place on trach collar, cuff deflated on rounds this morning, secured with sutures and trach ties  Face:  Symmetric without masses or lesions  OU: EOMI  OC/OP: tongue normal, floor of mouth WNL, no masses or lesions, OP clear  Neck: soft/flat, 6CN trach in place, lateral thyroidectomy incisions    LARYNGOSCOPY EXAM:   Pre-procedure diagnosis: Stridor  Post-procedure diagnosis: Stridor    Verbal consent was obtained from patient prior to procedure.  Flexible laryngoscopy was performed and revealed the following: Nasopharynx had no mass or exudate. Epiglottis, both aryepiglottic folds and both false vocal folds were normal. Bilateral paramedian position of vocal cords. Possible minimal right vocal cord movement. Airway not widely patent given paramedian position of bilateral vocal cords.     The patient tolerated the procedure well.      LABS                       15.3   16.44 )-----------( 282      ( 13 May 2023 05:30 )             46.0        136  |  101  |  10  ----------------------------<  148<H>  4.4   |  24  |  0.95    Ca    8.1<L>      13 May 2023 05:30  Phos  3.9       Mg     1.9         TPro  7.3  /  Alb  4.1  /  TBili  0.6  /  DBili  x   /  AST  15  /  ALT  9<L>  /  AlkPhos  95           Coagulation Studies-   PT/INR - ( 12 May 2023 15:39 )   PT: 13.5 sec;   INR: 1.13          PTT - ( 12 May 2023 15:39 )  PTT:29.9 sec    Endocrine Panel-  Calcium, Total Serum: 8.1 mg/dL ( @ 05:30)  Calcium, Total Serum: 8.5 mg/dL ( @ 03:43)  Calcium, Total Serum: 8.1 mg/dL ( @ 15:39)    PTH Intact, Intraoperative.: 33.1 pg/mL ( @ 02:18)  PTH Intact, Intraoperative.: 24.4 pg/mL ( @ 15:44)              MICROBIOLOGY:        RADIOLOGY & ADDITIONAL STUDIES:    
Interval Events:  no events overnight. Repeat Calcium 8.5 and 8.1, passed trial of void.     Patient seen and examined at bedside.      Allergies    shellfish (Anaphylaxis)  No Known Drug Allergies    Intolerances        Vital Signs Last 24 Hrs  T(C): 37.3 (13 May 2023 04:35), Max: 37.3 (13 May 2023 04:35)  T(F): 99.1 (13 May 2023 04:35), Max: 99.1 (13 May 2023 04:35)  HR: 70 (13 May 2023 08:20) (66 - 94)  BP: 166/99 (13 May 2023 08:20) (132/90 - 190/96)  BP(mean): 126 (13 May 2023 08:20) (11 - 136)  RR: 20 (13 May 2023 08:20) (8 - 29)  SpO2: 100% (13 May 2023 08:20) (95% - 100%)    Parameters below as of 13 May 2023 08:20  Patient On (Oxygen Delivery Method): tracheostomy collar  O2 Flow (L/min): 8  O2 Concentration (%): 35    05-12 @ 07:01  -  05-13 @ 07:00  --------------------------------------------------------  IN: 1760 mL / OUT: 952 mL / NET: 808 mL    05-13 @ 07:01 - 05-13 @ 08:48  --------------------------------------------------------  IN: 120 mL / OUT: 0 mL / NET: 120 mL      05-12 @ 07:01  -  05-13 @ 07:00  --------------------------------------------------------  IN: 1760 mL / OUT: 952 mL / NET: 808 mL    05-13 @ 07:01  -  05-13 @ 08:48  --------------------------------------------------------  IN: 120 mL / OUT: 0 mL / NET: 120 mL        Physical Exam:     GENERAL: NAD, Resting comfortably in bed, awake, opens eyes spontaneously  NEURO: AAOx3, No focal motor or sensory deficits  HEENT: NCAT, MMM, Normal conjunctiva, 6cuffed tracheostomy tube sutured in place. Mild serous sanguinous oozing noted around tracheostomy.   RESP: CTA after suctioning. Nonlabored breathing on tracheostomy, No respiratory distress.  CARD: Normal rate, Normal peripheral perfusion  GI: Soft, mild distention NT, No guarding, No rebound tenderness  EXTREM: WWP, No edema, No gross deformity of extremities  VASC: Palpable peripheral pulses  SKIN: No rashes, no lesions  PSYCH: C/o Anxiety at times.           LABS:      CBC Full  -  ( 13 May 2023 05:30 )  WBC Count : 16.44 K/uL  RBC Count : 5.16 M/uL  Hemoglobin : 15.3 g/dL  Hematocrit : 46.0 %  Platelet Count - Automated : 282 K/uL  Mean Cell Volume : 89.1 fl  Mean Cell Hemoglobin : 29.7 pg  Mean Cell Hemoglobin Concentration : 33.3 gm/dL  Auto Neutrophil # : 14.86 K/uL  Auto Lymphocyte # : 0.47 K/uL  Auto Monocyte # : 1.00 K/uL  Auto Eosinophil # : 0.00 K/uL  Auto Basophil # : 0.02 K/uL  Auto Neutrophil % : 90.4 %  Auto Lymphocyte % : 2.9 %  Auto Monocyte % : 6.1 %  Auto Eosinophil % : 0.0 %  Auto Basophil % : 0.1 %    05-13    136  |  101  |  10  ----------------------------<  148<H>  4.4   |  24  |  0.95    Ca    8.1<L>      13 May 2023 05:30  Phos  3.9     05-13  Mg     1.9     05-13    TPro  7.3  /  Alb  4.1  /  TBili  0.6  /  DBili  x   /  AST  15  /  ALT  9<L>  /  AlkPhos  95  05-13    PT/INR - ( 12 May 2023 15:39 )   PT: 13.5 sec;   INR: 1.13          PTT - ( 12 May 2023 15:39 )  PTT:29.9 sec                RADIOLOGY & ADDITIONAL STUDIES (The following images were personally reviewed):          A/p: 47M, current smoker, with PMHx of GERD, thyroid goiter s/p R thyroidectomy and R VATS in who presented for completion thyroidectomy on 5/12 c/b post extubation stridor and respiratory distress for which surgical tracheostomy was performed.. Transferred to the SICU post operatively for monitoring in setting on new tracheostomy.     NEURO: Pain and nausea control PRN: Dilaudid  Psych: Anxiety will follow up on sp& sw to decide regimen.   HEENT: New 6CN 75H cuffed trach, balloon up. Will discuss possible deflation today with ENT team.   CV: Goal MAP >65 HTN: Start Labetalol 10 prn sbp> 160  Cont LR @120  PULM: Saturating well on trach collar. poss deflate Cuff today  GI/FEN: NPO, LR@120 cc/hr, PPI. Bedside swallow eval pending  : No caruso, voids  ENDO: Calcium stableISS. Decadron 8mg Q8H x 3, Synthroid QD started will check TFT's in 1 Week  HEME: Post op Hb 14.2  from 15.3 pre-operatively   ID: Perioperative Ancef( 3/12-)   PPx: HSQ. SCD  LINES: PIV  WOUNDS/DRAINS: New Trach no drains.   PT/OT: OOB to chair PT ordered on 5/13.   DISPO: SDU today 
OTOLARYNGOLOGY (ENT) PROGRESS NOTE    PATIENT: KEVAN VIRAMONTES  MRN: [id number]  : [birth year]  [id number]  DATE OF SERVICE:  23  			         ID:KEVAN VIRAMONTES is a  47yMale with 30 pack year smoking history, s/p 20 right hemithyroidectomy via neck + right thorax (robotic) + sternotomy (Inra), final path thyroid with adenomatoid nodules and benign lymph nodes. Procedure c/b persistent R TVC paralysis and intermittent dysphagia. Pt noted to have L thyroid nodule, increased in size from prior imaging. Decision was made to proceed with L completion thyroidectomy and possible tracheostomy (6CN). Pt now s/p L completion thyroidectomy and tracheostomy.     Subjective/ Interval: Patient seen and examined at bedside. Patient doing well overnight. Cuff deflated on rounds this morning. Minimally able to phonate following cuff deflation. Tolerated 2-3 sips of clears.   23: Patient seen and examined at bedside. Patient stepped down from SICU yesterday night. SLP evaluated patient yesterday and recommends MBS on Monday.    ALLERGIES:  shellfish (Anaphylaxis)  No Known Drug Allergies    MEDICATIONS  (STANDING):  ceFAZolin   IVPB 2000 milliGRAM(s) IV Intermittent every 8 hours  chlorhexidine 2% Cloths 1 Application(s) Topical <User Schedule>  dextrose 5%. 1000 milliLiter(s) (50 mL/Hr) IV Continuous <Continuous>  dextrose 50% Injectable 25 Gram(s) IV Push once  glucagon  Injectable 1 milliGRAM(s) IntraMuscular once  heparin   Injectable 5000 Unit(s) SubCutaneous every 8 hours  insulin lispro (ADMELOG) corrective regimen sliding scale   SubCutaneous every 6 hours  lactated ringers. 1000 milliLiter(s) (90 mL/Hr) IV Continuous <Continuous>  levothyroxine Injectable 100 MICROGram(s) IV Push <User Schedule>  melatonin 5 milliGRAM(s) Oral at bedtime  niMODipine 30 milliGRAM(s) Oral <User Schedule>  pantoprazole  Injectable 40 milliGRAM(s) IV Push every 24 hours    MEDICATIONS  (PRN):  dextrose Oral Gel 15 Gram(s) Oral once PRN Blood Glucose LESS THAN 70 milliGRAM(s)/deciliter  HYDROmorphone  Injectable 0.5 milliGRAM(s) IV Push every 3 hours PRN Severe Pain (7 - 10)  HYDROmorphone  Injectable 0.25 milliGRAM(s) IV Push every 3 hours PRN Moderate Pain (4 - 6)  labetalol Injectable 10 milliGRAM(s) IV Push every 6 hours PRN sbp> 160      Vital Signs Last 24 Hrs  T(C): 36.3 (14 May 2023 09:00), Max: 37.5 (13 May 2023 21:35)  T(F): 97.3 (14 May 2023 09:00), Max: 99.5 (13 May 2023 21:35)  HR: 84 (14 May 2023 08:45) (84 - 113)  BP: 142/88 (14 May 2023 08:45) (121/68 - 173/98)  BP(mean): 110 (14 May 2023 08:45) (89 - 130)  RR: 19 (14 May 2023 08:45) (15 - 24)  SpO2: 100% (14 May 2023 08:45) (95% - 100%)    Parameters below as of 14 May 2023 08:45  Patient On (Oxygen Delivery Method): room air      I&O's Detail    13 May 2023 07:01  -  14 May 2023 07:00  --------------------------------------------------------  IN:    IV PiggyBack: 150 mL    IV PiggyBack: 150 mL    Jevity 1.2: 120 mL    Lactated Ringers: 240 mL    Lactated Ringers: 1935 mL  Total IN: 2595 mL    OUT:    Stool (mL): 2 mL    Voided (mL): 575 mL  Total OUT: 577 mL    Total NET: 2018 mL      14 May 2023 07:01  -  14 May 2023 10:40  --------------------------------------------------------  IN:    Jevity 1.2: 20 mL  Total IN: 20 mL    OUT:  Total OUT: 0 mL    Total NET: 20 mL              PHYSICAL EXAM:  General: NAD, AOx3  Respiratory: 6CN trach in place on trach collar, cuff deflated, secured with sutures and trach ties  Face:  Symmetric without masses or lesions  OU: EOMI  OC/OP: tongue normal, floor of mouth WNL, no masses or lesions, OP clear  Neck: soft/flat, 6CN trach in place, lateral thyroidectomy incisions          .  LABS:                         15.3   16.44 )-----------( 282      ( 13 May 2023 05:30 )             46.0         136  |  101  |  10  ----------------------------<  148<H>  4.4   |  24  |  0.95    Ca    8.1<L>      13 May 2023 05:30  Phos  3.9       Mg     1.9         TPro  7.3  /  Alb  4.1  /  TBili  0.6  /  DBili  x   /  AST  15  /  ALT  9<L>  /  AlkPhos  95  -13    PT/INR - ( 12 May 2023 15:39 )   PT: 13.5 sec;   INR: 1.13          PTT - ( 12 May 2023 15:39 )  PTT:29.9 sec              RADIOLOGY, EKG & ADDITIONAL TESTS: Reviewed. 
OTOLARYNGOLOGY (ENT) PROGRESS NOTE    PATIENT: KEVAN VIRAMONTES  MRN: [id number]  : [birth year]  [id number]  DATE OF SERVICE:  23  			         ID:KEVAN VIRAMONTES is a  47yMale with 30 pack year smoking history, s/p 20 right hemithyroidectomy via neck + right thorax (robotic) + sternotomy (Inra), final path thyroid with adenomatoid nodules and benign lymph nodes. Procedure c/b persistent R TVC paralysis and intermittent dysphagia. Pt noted to have L thyroid nodule, increased in size from prior imaging. Decision was made to proceed with L completion thyroidectomy and possible tracheostomy (6CN). Pt now s/p L completion thyroidectomy and tracheostomy.     Subjective/ Interval: Patient seen and examined at bedside. Patient doing well overnight. Cuff deflated on rounds this morning. Minimally able to phonate following cuff deflation. Tolerated 2-3 sips of clears.   23: Patient seen and examined at bedside. Patient stepped down from SICU yesterday night. SLP evaluated patient yesterday and recommends MBS on Monday.  5/15/23: Patient seen and examined at bedside. He was evaluated by SLP yesterday, who recommended barium swallow today. Tube feeds were advanced to goal.  : Patient seen and examined at bedside. He had MBS performed yesterday and was advanced to regular diet, which he is tolerating well.   Patient seen at bedside and discussed with attending. No issues overnight. No new complaints this morning.    ALLERGIES  shellfish (Anaphylaxis)  No Known Drug Allergies    MEDICATIONS  (STANDING):  ceFAZolin   IVPB 2000 milliGRAM(s) IV Intermittent every 8 hours  dextrose 5%. 1000 milliLiter(s) (50 mL/Hr) IV Continuous <Continuous>  dextrose 50% Injectable 25 Gram(s) IV Push once  glucagon  Injectable 1 milliGRAM(s) IntraMuscular once  heparin   Injectable 5000 Unit(s) SubCutaneous every 8 hours  insulin lispro (ADMELOG) corrective regimen sliding scale   SubCutaneous every 6 hours  levothyroxine 100 MICROGram(s) Oral daily  niMODipine 30 milliGRAM(s) Oral <User Schedule>  pantoprazole  Injectable 40 milliGRAM(s) IV Push every 24 hours  polyethylene glycol 3350 17 Gram(s) Oral daily  senna 1 Tablet(s) Oral daily    MEDICATIONS  (PRN):  acetaminophen     Tablet .. 650 milliGRAM(s) Oral every 6 hours PRN Mild Pain (1 - 3)  dextrose Oral Gel 15 Gram(s) Oral once PRN Blood Glucose LESS THAN 70 milliGRAM(s)/deciliter  LORazepam     Tablet 0.5 milliGRAM(s) Oral every 12 hours PRN Anxiety  melatonin 1 milliGRAM(s) Oral at bedtime PRN Insomnia  oxyCODONE    IR 5 milliGRAM(s) Oral every 4 hours PRN Moderate Pain (4 - 6)  oxyCODONE    IR 10 milliGRAM(s) Oral every 6 hours PRN Severe Pain (7 - 10)        LABS                         14.6   8.35  )-----------( 347      ( 17 May 2023 05:30 )             43.8    05-16    139  |  100  |  14  ----------------------------<  104<H>  3.6   |  26  |  0.91    Ca    7.7<L>      16 May 2023 05:30  Phos  3.7     05-16  Mg     2.0     05-16                INs & OUTs  Drains:       VITAL SIGNS:  ICU Vital Signs Last 24 Hrs  T(C): 37.5 (17 May 2023 04:51), Max: 37.5 (16 May 2023 18:02)  T(F): 99.5 (17 May 2023 04:51), Max: 99.5 (16 May 2023 18:02)  HR: 82 (17 May 2023 03:30) (80 - 92)  BP: 129/92 (17 May 2023 03:30) (118/73 - 141/92)  BP(mean): 107 (17 May 2023 03:30) (91 - 111)  ABP: --  ABP(mean): --  RR: 16 (17 May 2023 03:30) (16 - 18)  SpO2: 98% (17 May 2023 03:30) (96% - 100%)    O2 Parameters below as of 17 May 2023 03:30  Patient On (Oxygen Delivery Method): tracheostomy collar  O2 Flow (L/min): 10  O2 Concentration (%): 35                  PHYSICAL EXAM:  General: NAD, AOx3  Respiratory: 6CN trach in place on trach collar, cuff deflated, secured with sutures and trach ties, with moderate secretions  Face:  Symmetric without masses or lesions  OU: EOMI  OC/OP: tongue normal, floor of mouth WNL, no masses or lesions, OP clear  Neck: soft/flat, 6CN trach in place, lateral thyroidectomy incisions                RADIOLOGY, EKG & ADDITIONAL TESTS: Reviewed.

## 2023-05-18 NOTE — PROGRESS NOTE ADULT - ASSESSMENT
Assessment and Plan:  KEVAN VIRAMONTES is a  47yMale PMH R hemithyroidectomy w sternotomy c/b R TVC paralysis. Found to have L thyroid nodule now s/p L completion thyroidectomy and tracheostomy. Likely with new L RLN praxis.   - continue Ancef  - continue DVT prophlyaxis  - continue synthroid  - SLP evaluated, recommended MBS on Monday  - will start trach teaching and trach supply set up    [facility] at [phone] with any questions/concerns.    Ayanna Kim  
Assessment and Plan:  KEVAN VIRAMONTES is a  47yMale PMH R hemithyroidectomy w sternotomy c/b R TVC paralysis. Found to have L thyroid nodule now s/p L completion thyroidectomy and tracheostomy. Likely with new L RLN praxis.   - continue Ancef  - continue DVT prophlyaxis  - continue synthroid  - SLP evaluated, recommended MBS on today  - continue trach teaching and supply set up      [facility] E[facility] at [phone] with any questions/concerns.    Ayanna Kim  
Assessment and Plan:  KEVAN VIRAMONTES is a  47yMale PMH R hemithyroidectomy w sternotomy c/b R TVC paralysis. Found to have L thyroid nodule now s/p L completion thyroidectomy and tracheostomy. Likely with new L RLN praxis.   - continue Ancef  - continue DVT prophlyaxis  - continue synthroid  - continue regular diet  - trach change to uncuffed trach today  - SLP consultation for speaking valve   - continue trach teaching and supply set up      Page E[facility] at [phone] with any questions/concerns.    
      Assessment and Plan:  KEVAN VIRAMONTES is a  47yMale PMH R hemithyroidectomy w sternotomy c/b R TVC paralysis. Found to have L thyroid nodule now s/p L completion thyroidectomy and tracheostomy. Likely with new L RLN praxis.     Plan:    increase nimodipine to 60mg TID saturday  - continue Ancef  - continue DVT prophlyaxis  - continue synthroid  - continue regular diet  - PMV   - continue trach teaching and supply set up    [facility] at [phone] with any questions/concerns.    Jovana Decker PA-C  05-18-23 @ 08:38  
Assessment and Plan:  KEVAN VIRAMONTES is a  47yMale PMH R hemithyroidectomy w sternotomy c/b R TVC paralysis. Found to have L thyroid nodule now s/p L completion thyroidectomy and tracheostomy. Likely with new L RLN praxis.   - continue Ancef  - can start DVT ppx today  - decadron x3 doses  - synthroid starting today  - SLP eval   - okay to step down today    [facility] at [phone] with any questions/concerns.    Ayanna Kim  05-13-23 @ 10:13
Assessment and Plan:  KEVAN VIRAMONTES is a  47yMale PMH R hemithyroidectomy w sternotomy c/b R TVC paralysis. Found to have L thyroid nodule now s/p L completion thyroidectomy and tracheostomy. Likely with new L RLN praxis.   - continue Ancef  - continue DVT prophlyaxis  - continue synthroid  - continue regular diet  - plan for change to uncuffed trach tomorrow  - continue trach teaching and supply set up      [facility] at [phone] with any questions/concerns.    Ayanna Kim

## 2023-05-18 NOTE — DISCHARGE NOTE PROVIDER - HOSPITAL COURSE
OTOLARYNGOLOGY (ENT) DISCHARGE SUMMARY    PATIENT: KEVAN VIRAMONTES               : [birth year]  MRN: [id number]  ADMISSION DATE: 23  Discharge Date: 23 @ 14:26  Attending Physician: Edgardo Gray    Admission Diagnosis:  R13.10        Status post:Completion thyroidectomy    Tracheotomy, adult         Chronic Conditions:  PUD (peptic ulcer disease)    Dysphagia        HPI:KEVAN VIRAMONTES  is a 47y Male who suffers from the aforementioned conditions and was brought to the hospital today for surgery.    Brief Hospital Course:      Disposition: Home with family.****    Discharge Condition: Stable  OTOLARYNGOLOGY (ENT) DISCHARGE SUMMARY    PATIENT: KEVAN VIRAMONTES               : [birth year]  MRN: [id number]  ADMISSION DATE: 23  Discharge Date: 23 @ 14:26  Attending Physician: Edgardo Gray    Admission Diagnosis:  R13.10        Status post:Completion thyroidectomy    Tracheotomy, adult         Chronic Conditions:  PUD (peptic ulcer disease)    Dysphagia        ID:KEVAN VIRAMONTES is a  47yMale with 30 pack year smoking history, s/p 20 right hemithyroidectomy via neck + right thorax (robotic) + sternotomy (Inra), final path thyroid with adenomatoid nodules and benign lymph nodes. Procedure c/b persistent R TVC paralysis and intermittent dysphagia. Pt noted to have L thyroid nodule, increased in size from prior imaging. Decision was made to proceed with L completion thyroidectomy and possible tracheostomy (6CN). Pt now s/p L completion thyroidectomy c/b L TVC paresis and subsequent tracheostomy.     Brief Hospital Course:   : Patient seen and examined at bedside. Patient doing well overnight. Cuff deflated on rounds this morning. Minimally able to phonate following cuff deflation. Tolerated 2-3 sips of clears.   23: Patient seen and examined at bedside. Patient stepped down from SICU yesterday night. SLP evaluated patient yesterday and recommends MBS on Monday.  5/15/23: Patient seen and examined at bedside. He was evaluated by SLP yesterday, who recommended barium swallow today. Tube feeds were advanced to goal.  : Patient seen and examined at bedside. He had MBS performed yesterday and was advanced to regular diet, which he is tolerating well.   Patient seen at bedside and discussed with attending. No issues overnight. No new complaints this morning.  :  Patient seen bedside, continues to do trach care,  pending supplies, pain controlled, tolerating diet and PMV          Disposition: Home with family.    Discharge Condition: Stable

## 2023-05-18 NOTE — DISCHARGE NOTE NURSING/CASE MANAGEMENT/SOCIAL WORK - NSDCPEFALRISK_GEN_ALL_CORE
For information on Fall & Injury Prevention, visit: [email or web address] OR  [email or web address] OR  https://www.cdc.gov/steadi/patient.html

## 2023-05-18 NOTE — DISCHARGE NOTE NURSING/CASE MANAGEMENT/SOCIAL WORK - PATIENT PORTAL LINK FT
You can access the FollowMyHealth Patient Portal offered by [facility] by registering at the following website: [email or web address]. By joining [employer]’s FollowMyHealth portal, you will also be able to view your health information using other applications (apps) compatible with our system.

## 2023-05-18 NOTE — DISCHARGE NOTE PROVIDER - NSDCMRMEDTOKEN_GEN_ALL_CORE_FT
senna oral tablet: 2 tab(s) orally once a day (at bedtime), As Needed -for constipation    levothyroxine 100 mcg (0.1 mg) oral tablet: 1 tab(s) orally once a day please take one hour before breakfast  niMODipine 30 mg oral capsule: 2 cap(s) orally every 8 hours

## 2023-05-19 LAB — SURGICAL PATHOLOGY STUDY: SIGNIFICANT CHANGE UP

## 2023-05-22 PROBLEM — R13.10 DYSPHAGIA, UNSPECIFIED: Chronic | Status: ACTIVE | Noted: 2023-05-11

## 2023-05-24 ENCOUNTER — APPOINTMENT (OUTPATIENT)
Dept: OTOLARYNGOLOGY | Facility: CLINIC | Age: 48
End: 2023-05-24
Payer: COMMERCIAL

## 2023-05-24 VITALS
WEIGHT: 190 LBS | HEART RATE: 90 BPM | BODY MASS INDEX: 24.38 KG/M2 | HEIGHT: 74 IN | DIASTOLIC BLOOD PRESSURE: 85 MMHG | SYSTOLIC BLOOD PRESSURE: 124 MMHG | TEMPERATURE: 98.2 F

## 2023-05-24 DIAGNOSIS — E04.2 NONTOXIC MULTINODULAR GOITER: ICD-10-CM
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
## 2023-05-24 DIAGNOSIS — F17.210 NICOTINE DEPENDENCE, CIGARETTES, UNCOMPLICATED: ICD-10-CM

## 2023-05-24 DIAGNOSIS — T81.89XA OTHER COMPLICATIONS OF PROCEDURES, NOT ELSEWHERE CLASSIFIED, INITIAL ENCOUNTER: ICD-10-CM

## 2023-05-24 DIAGNOSIS — E04.9 NONTOXIC GOITER, UNSPECIFIED: ICD-10-CM

## 2023-05-24 DIAGNOSIS — R13.10 DYSPHAGIA, UNSPECIFIED: ICD-10-CM

## 2023-05-24 DIAGNOSIS — Z91.013 ALLERGY TO SEAFOOD: ICD-10-CM

## 2023-05-24 DIAGNOSIS — K27.9 PEPTIC ULCER, SITE UNSPECIFIED, UNSPECIFIED AS ACUTE OR CHRONIC, WITHOUT HEMORRHAGE OR PERFORATION: ICD-10-CM

## 2023-05-24 DIAGNOSIS — J38.02 PARALYSIS OF VOCAL CORDS AND LARYNX, BILATERAL: ICD-10-CM

## 2023-05-24 DIAGNOSIS — K21.9 GASTRO-ESOPHAGEAL REFLUX DISEASE WITHOUT ESOPHAGITIS: ICD-10-CM

## 2023-05-24 PROCEDURE — 31575 DIAGNOSTIC LARYNGOSCOPY: CPT | Mod: 79

## 2023-05-24 PROCEDURE — 99024 POSTOP FOLLOW-UP VISIT: CPT

## 2023-05-24 RX ORDER — OXYCODONE AND ACETAMINOPHEN 5; 325 MG/1; MG/1
5-325 TABLET ORAL
Qty: 21 | Refills: 0 | Status: ACTIVE | COMMUNITY
Start: 2023-05-24 | End: 1900-01-01

## 2023-05-24 NOTE — HISTORY OF PRESENT ILLNESS
[FreeTextEntry1] : Pt is doing well post op. Pt has strong voice with and with out Passy Shiela. Pt has much improved swallowing than before Surgery. No sign of hypocalcemia. Pt is tolerating trach well ans has visiting nurse services.

## 2023-05-24 NOTE — PROCEDURE
[Flexible Endoscope] : examined with the flexible endoscope [True Vocal Cords Bilateral Paralysis] : bilateral true vocal cord paralysis [Normal] : posterior cricoid area had healthy pink mucosa in the interarytenoid area and the esophageal inlet [de-identified] : 1-2 mm glottis chink, mild movement of right cord, minimal movement of left

## 2023-05-24 NOTE — PHYSICAL EXAM
[Normal] : mucosa is normal [Midline] : trachea located in midline position [de-identified] : wel healed trach site, trach in place securely

## 2023-05-25 ENCOUNTER — APPOINTMENT (OUTPATIENT)
Dept: OTOLARYNGOLOGY | Facility: CLINIC | Age: 48
End: 2023-05-25

## 2023-05-25 ENCOUNTER — TRANSCRIPTION ENCOUNTER (OUTPATIENT)
Age: 48
End: 2023-05-25

## 2023-06-06 NOTE — PRE-OP CHECKLIST - WAS PATIENT ON BETA BLOCKER?
Dr. Jessica Tovar present in the Vascular Lab - cancelled arterial study as patient had studies done on 04/10/2023.
No

## 2023-06-07 ENCOUNTER — APPOINTMENT (OUTPATIENT)
Dept: OTOLARYNGOLOGY | Facility: CLINIC | Age: 48
End: 2023-06-07
Payer: COMMERCIAL

## 2023-06-07 VITALS
BODY MASS INDEX: 24.38 KG/M2 | SYSTOLIC BLOOD PRESSURE: 131 MMHG | TEMPERATURE: 98.7 F | HEIGHT: 74 IN | WEIGHT: 190 LBS | DIASTOLIC BLOOD PRESSURE: 93 MMHG | HEART RATE: 83 BPM

## 2023-06-07 PROCEDURE — 99214 OFFICE O/P EST MOD 30 MIN: CPT | Mod: 24

## 2023-06-07 PROCEDURE — 31575 DIAGNOSTIC LARYNGOSCOPY: CPT | Mod: 79

## 2023-06-07 NOTE — PROCEDURE
[Hoarseness] : hoarseness not clearly evaluated by indirect laryngoscopy [None] : none [Flexible Endoscope] : examined with the flexible endoscope [Normal] : the false vocal folds were pink and regular, the ventricular sulcus was open, the true vocal folds were glistening white, tense and of equal length, mobility, and height [True Vocal Cords Bilateral Paralysis] : bilateral true vocal cord paralysis [True Vocal Cords Erythematous] : bilateral true vocal cord edema [Glottis Arytenoid Cartilages Erythema] : bilateral arytenoid ~M erythema [Arytenoid Erythema ___ /4] : arytenoid erythema [unfilled]U/4 [de-identified] : vocal cord paresis

## 2023-06-07 NOTE — HISTORY OF PRESENT ILLNESS
[FreeTextEntry1] : Pt is 4 weeks s/p thyroidectomy and tracheotomy. Reports nighttime coughing spells with blood tinged mucus and blood clots. Happens once a day. He is breathing well. He is on a regular diet.  Tolerating his secretions well.

## 2023-06-07 NOTE — PHYSICAL EXAM
[Normal] : mucosa is normal [Midline] : trachea located in midline position [Laryngoscopy Performed] : laryngoscopy was performed, see procedure section for findings [de-identified] : trach changed, [id number] [de-identified] : 3+ bilaterally

## 2023-06-14 RX ORDER — LEVOTHYROXINE SODIUM 0.1 MG/1
100 TABLET ORAL DAILY
Qty: 30 | Refills: 0 | Status: ACTIVE | COMMUNITY
Start: 2023-06-14 | End: 1900-01-01

## 2023-06-19 ENCOUNTER — APPOINTMENT (OUTPATIENT)
Dept: OTOLARYNGOLOGY | Facility: CLINIC | Age: 48
End: 2023-06-19
Payer: COMMERCIAL

## 2023-06-19 PROCEDURE — 31579 LARYNGOSCOPY TELESCOPIC: CPT | Mod: 78

## 2023-06-19 PROCEDURE — 92524 BEHAVRAL QUALIT ANALYS VOICE: CPT | Mod: GN

## 2023-06-19 PROCEDURE — 92520 LARYNGEAL FUNCTION STUDIES: CPT | Mod: GN,59

## 2023-06-26 ENCOUNTER — APPOINTMENT (OUTPATIENT)
Dept: OTOLARYNGOLOGY | Facility: CLINIC | Age: 48
End: 2023-06-26
Payer: COMMERCIAL

## 2023-06-26 PROCEDURE — 92507 TX SP LANG VOICE COMM INDIV: CPT | Mod: GN

## 2023-07-03 ENCOUNTER — APPOINTMENT (OUTPATIENT)
Dept: RADIOLOGY | Facility: HOSPITAL | Age: 48
End: 2023-07-03

## 2023-07-10 ENCOUNTER — APPOINTMENT (OUTPATIENT)
Dept: OTOLARYNGOLOGY | Facility: CLINIC | Age: 48
End: 2023-07-10
Payer: COMMERCIAL

## 2023-07-10 PROCEDURE — 92507 TX SP LANG VOICE COMM INDIV: CPT | Mod: GN

## 2023-07-14 ENCOUNTER — APPOINTMENT (OUTPATIENT)
Dept: OTOLARYNGOLOGY | Facility: CLINIC | Age: 48
End: 2023-07-14
Payer: COMMERCIAL

## 2023-07-14 PROCEDURE — 31615 TRCHEOBRNCHSC EST TRACHS INC: CPT | Mod: 79

## 2023-07-14 PROCEDURE — 99024 POSTOP FOLLOW-UP VISIT: CPT

## 2023-07-14 RX ORDER — PANTOPRAZOLE 40 MG/1
40 TABLET, DELAYED RELEASE ORAL
Qty: 30 | Refills: 3 | Status: ACTIVE | COMMUNITY
Start: 2022-08-19 | End: 1900-01-01

## 2023-07-14 RX ORDER — MOMETASONE 50 UG/1
50 SPRAY, METERED NASAL DAILY
Qty: 1 | Refills: 3 | Status: ACTIVE | COMMUNITY
Start: 2023-07-14 | End: 1900-01-01

## 2023-07-14 NOTE — PHYSICAL EXAM
[Normal] : mucosa is normal [Midline] : trachea located in midline position [de-identified] : trach in place  [de-identified] : 3+ bilaterally

## 2023-07-14 NOTE — HISTORY OF PRESENT ILLNESS
[de-identified] : Pt is 5 weeks s/p thyroidectomy and tracheotomy. Reports intermittent coughing spells.  He is breathing well. He is on a regular diet. Tolerating his secretions well.

## 2023-07-14 NOTE — PROCEDURE
[Hoarseness] : hoarseness not clearly evaluated by indirect laryngoscopy [Flexible Endoscope] : examined with the flexible endoscope [de-identified] : right paresis with hooding of arytenoid\par left improvement in movement with increased airway

## 2023-07-24 ENCOUNTER — APPOINTMENT (OUTPATIENT)
Dept: OTOLARYNGOLOGY | Facility: CLINIC | Age: 48
End: 2023-07-24
Payer: COMMERCIAL

## 2023-07-24 PROCEDURE — 92507 TX SP LANG VOICE COMM INDIV: CPT | Mod: GN

## 2023-08-01 NOTE — DIETITIAN INITIAL EVALUATION ADULT - NUTRITION DIAGNOSITC TERMINOLOGY #1
[Never (0 pts)] : Never (0 points) [No] : In the past 12 months have you used drugs other than those required for medical reasons? No [No falls in past year] : Patient reported no falls in the past year [0] : 2) Feeling down, depressed, or hopeless: Not at all (0) [PHQ-2 Negative - No further assessment needed] : PHQ-2 Negative - No further assessment needed [Never] : Never [RYE1Sudgw] : 0 Increased Nutrient Needs...

## 2023-08-07 ENCOUNTER — APPOINTMENT (OUTPATIENT)
Dept: OTOLARYNGOLOGY | Facility: CLINIC | Age: 48
End: 2023-08-07
Payer: COMMERCIAL

## 2023-08-07 PROCEDURE — 92507 TX SP LANG VOICE COMM INDIV: CPT | Mod: GN

## 2023-08-11 ENCOUNTER — RX RENEWAL (OUTPATIENT)
Age: 48
End: 2023-08-11

## 2023-08-11 ENCOUNTER — APPOINTMENT (OUTPATIENT)
Dept: OTOLARYNGOLOGY | Facility: CLINIC | Age: 48
End: 2023-08-11
Payer: COMMERCIAL

## 2023-08-11 DIAGNOSIS — R06.02 SHORTNESS OF BREATH: ICD-10-CM

## 2023-08-11 DIAGNOSIS — J30.89 OTHER ALLERGIC RHINITIS: ICD-10-CM

## 2023-08-11 DIAGNOSIS — R13.10 DYSPHAGIA, UNSPECIFIED: ICD-10-CM

## 2023-08-11 PROCEDURE — 31615 TRCHEOBRNCHSC EST TRACHS INC: CPT

## 2023-08-11 PROCEDURE — 99214 OFFICE O/P EST MOD 30 MIN: CPT | Mod: 25

## 2023-08-11 RX ORDER — LEVOCETIRIZINE DIHYDROCHLORIDE 5 MG/1
5 TABLET ORAL DAILY
Qty: 1 | Refills: 3 | Status: ACTIVE | COMMUNITY
Start: 2023-08-11 | End: 1900-01-01

## 2023-08-11 RX ORDER — METHYLPREDNISOLONE 4 MG/1
4 TABLET ORAL
Qty: 1 | Refills: 0 | Status: ACTIVE | COMMUNITY
Start: 2023-08-11 | End: 1900-01-01

## 2023-08-11 NOTE — PHYSICAL EXAM
[de-identified] : wel healed trach site, trach in place securely  [de-identified] : edema  [Normal] : mucosa is normal [Midline] : trachea located in midline position

## 2023-08-11 NOTE — HISTORY OF PRESENT ILLNESS
[FreeTextEntry1] : Pt is doing well overall. Pt has improved breathing, sleeping, and exercise. Pt has improved talking and the voice has improved. Speech therapy notes reviewed 8/7/23. Swallowing has improved as well. Pt has allergies and congestion in the nose. Pt has some improvement with nasal sprays.

## 2023-08-11 NOTE — PROCEDURE
[Hoarseness] : hoarseness not clearly evaluated by indirect laryngoscopy [de-identified] : vocal cord paresis improving. laryngeal edema

## 2023-08-21 ENCOUNTER — APPOINTMENT (OUTPATIENT)
Dept: OTOLARYNGOLOGY | Facility: CLINIC | Age: 48
End: 2023-08-21
Payer: COMMERCIAL

## 2023-08-21 PROCEDURE — 92507 TX SP LANG VOICE COMM INDIV: CPT | Mod: GN

## 2023-08-23 NOTE — ED PROVIDER NOTE - IV ALTEPLASE EXCL REL HIDDEN
GI follow-up    Patient stable.  No complaints.  Tolerating diet.    Labs reviewed, amylase was 400.    Ammonia level 37    He still rather disoriented    Abdomen is soft and nontender    Extremities reveal no edema    Assessment: Mild alcoholic pancreatitis and hyperammonemia, resolving.  He is tolerating a diet    Plan: Advance diet to low-fat as tolerated.  Continue lactulose 10 g daily.  We will stand by.  show

## 2023-09-08 ENCOUNTER — APPOINTMENT (OUTPATIENT)
Dept: OTOLARYNGOLOGY | Facility: CLINIC | Age: 48
End: 2023-09-08
Payer: COMMERCIAL

## 2023-09-08 PROCEDURE — 31575 DIAGNOSTIC LARYNGOSCOPY: CPT

## 2023-09-08 PROCEDURE — 99214 OFFICE O/P EST MOD 30 MIN: CPT | Mod: 25

## 2023-09-08 PROCEDURE — 92507 TX SP LANG VOICE COMM INDIV: CPT | Mod: GN

## 2023-09-08 NOTE — PROCEDURE
[None] : none [Flexible Endoscope] : examined with the flexible endoscope [de-identified] : bilateral paresis  left improving improving adduction right arytenoid flaccidity  excellent patent airway  vocal fold edema  [de-identified] : paralysis

## 2023-09-08 NOTE — HISTORY OF PRESENT ILLNESS
[FreeTextEntry1] : Pt doing well overall. Pt tolerating capping well. Pt has improved breathing, voice and swallowing. Pt has some reticence with exercise. Pt has been attending voice therapy and has been doing well. Pt is on reflux meds. Pt feels better after swelling from a few weeks ago. Speech pathology notes reviewed 8/21/23.

## 2023-09-08 NOTE — PHYSICAL EXAM
[Normal] : mucosa is normal [Midline] : trachea located in midline position [de-identified] : wel healed trach site, trach in place securely

## 2023-09-08 NOTE — ASSESSMENT
[FreeTextEntry1] : Pt will continue capping for the time being. Pt will begin to exert himself while capping. We will discuss decannulation at that time. Pt will continue reflux treatment.

## 2023-09-15 RX ORDER — NIMODIPINE 30 MG/1
30 CAPSULE, LIQUID FILLED ORAL
Qty: 90 | Refills: 2 | Status: ACTIVE | COMMUNITY
Start: 2023-06-20 | End: 1900-01-01

## 2023-09-20 ENCOUNTER — APPOINTMENT (OUTPATIENT)
Dept: OTOLARYNGOLOGY | Facility: CLINIC | Age: 48
End: 2023-09-20
Payer: COMMERCIAL

## 2023-09-20 PROCEDURE — 31615 TRCHEOBRNCHSC EST TRACHS INC: CPT

## 2023-09-20 PROCEDURE — 99213 OFFICE O/P EST LOW 20 MIN: CPT | Mod: 25

## 2023-10-12 ENCOUNTER — NON-APPOINTMENT (OUTPATIENT)
Age: 48
End: 2023-10-12

## 2023-10-12 ENCOUNTER — APPOINTMENT (OUTPATIENT)
Dept: PULMONOLOGY | Facility: CLINIC | Age: 48
End: 2023-10-12
Payer: COMMERCIAL

## 2023-10-12 VITALS
HEIGHT: 74 IN | OXYGEN SATURATION: 99 % | TEMPERATURE: 98.4 F | HEART RATE: 102 BPM | BODY MASS INDEX: 27.34 KG/M2 | WEIGHT: 213 LBS | SYSTOLIC BLOOD PRESSURE: 131 MMHG | DIASTOLIC BLOOD PRESSURE: 89 MMHG | RESPIRATION RATE: 12 BRPM

## 2023-10-12 DIAGNOSIS — Z01.818 ENCOUNTER FOR OTHER PREPROCEDURAL EXAMINATION: ICD-10-CM

## 2023-10-12 DIAGNOSIS — Z80.42 FAMILY HISTORY OF MALIGNANT NEOPLASM OF PROSTATE: ICD-10-CM

## 2023-10-12 PROCEDURE — 99213 OFFICE O/P EST LOW 20 MIN: CPT

## 2023-10-13 ENCOUNTER — NON-APPOINTMENT (OUTPATIENT)
Age: 48
End: 2023-10-13

## 2023-10-13 LAB
ALBUMIN SERPL ELPH-MCNC: 4.5 G/DL
ALP BLD-CCNC: 102 U/L
ALT SERPL-CCNC: 12 U/L
ANION GAP SERPL CALC-SCNC: 15 MMOL/L
APTT BLD: 33 SEC
AST SERPL-CCNC: 14 U/L
BASOPHILS # BLD AUTO: 0.04 K/UL
BASOPHILS NFR BLD AUTO: 0.6 %
BILIRUB SERPL-MCNC: <0.2 MG/DL
BUN SERPL-MCNC: 13 MG/DL
CALCIUM SERPL-MCNC: 8.9 MG/DL
CHLORIDE SERPL-SCNC: 103 MMOL/L
CO2 SERPL-SCNC: 24 MMOL/L
CREAT SERPL-MCNC: 1.2 MG/DL
EGFR: 75 ML/MIN/1.73M2
EOSINOPHIL # BLD AUTO: 0.24 K/UL
EOSINOPHIL NFR BLD AUTO: 3.6 %
GLUCOSE SERPL-MCNC: 64 MG/DL
HCT VFR BLD CALC: 44.4 %
HGB BLD-MCNC: 14.2 G/DL
IMM GRANULOCYTES NFR BLD AUTO: 0.4 %
INR PPP: 1.01 RATIO
LYMPHOCYTES # BLD AUTO: 1.83 K/UL
LYMPHOCYTES NFR BLD AUTO: 27.3 %
MAN DIFF?: NORMAL
MCHC RBC-ENTMCNC: 28.9 PG
MCHC RBC-ENTMCNC: 32 GM/DL
MCV RBC AUTO: 90.4 FL
MONOCYTES # BLD AUTO: 0.74 K/UL
MONOCYTES NFR BLD AUTO: 11 %
NEUTROPHILS # BLD AUTO: 3.82 K/UL
NEUTROPHILS NFR BLD AUTO: 57.1 %
PLATELET # BLD AUTO: 286 K/UL
POTASSIUM SERPL-SCNC: 4.3 MMOL/L
PROT SERPL-MCNC: 7.3 G/DL
PT BLD: 11.4 SEC
RBC # BLD: 4.91 M/UL
RBC # FLD: 14.5 %
SODIUM SERPL-SCNC: 142 MMOL/L
WBC # FLD AUTO: 6.7 K/UL

## 2023-10-19 ENCOUNTER — TRANSCRIPTION ENCOUNTER (OUTPATIENT)
Age: 48
End: 2023-10-19

## 2023-10-20 ENCOUNTER — APPOINTMENT (OUTPATIENT)
Dept: PULMONOLOGY | Facility: HOSPITAL | Age: 48
End: 2023-10-20

## 2023-10-20 ENCOUNTER — OUTPATIENT (OUTPATIENT)
Dept: OUTPATIENT SERVICES | Facility: HOSPITAL | Age: 48
LOS: 1 days | Discharge: HOME | End: 2023-10-20
Payer: COMMERCIAL

## 2023-10-20 DIAGNOSIS — E89.0 POSTPROCEDURAL HYPOTHYROIDISM: Chronic | ICD-10-CM

## 2023-10-20 DIAGNOSIS — Z98.890 OTHER SPECIFIED POSTPROCEDURAL STATES: Chronic | ICD-10-CM

## 2023-10-20 PROCEDURE — C1889: CPT

## 2023-10-20 PROCEDURE — 31615 TRCHEOBRNCHSC EST TRACHS INC: CPT

## 2023-10-20 PROCEDURE — 31641 BRONCHOSCOPY TREAT BLOCKAGE: CPT | Mod: GC

## 2023-10-20 DEVICE — PROBE FIAPC   1.5ML: Type: IMPLANTABLE DEVICE | Status: FUNCTIONAL

## 2023-10-22 ENCOUNTER — NON-APPOINTMENT (OUTPATIENT)
Age: 48
End: 2023-10-22

## 2023-10-26 DIAGNOSIS — Z91.013 ALLERGY TO SEAFOOD: ICD-10-CM

## 2023-10-26 DIAGNOSIS — J95.09 OTHER TRACHEOSTOMY COMPLICATION: ICD-10-CM

## 2023-10-26 DIAGNOSIS — F32.A DEPRESSION, UNSPECIFIED: ICD-10-CM

## 2023-10-26 DIAGNOSIS — E89.0 POSTPROCEDURAL HYPOTHYROIDISM: ICD-10-CM

## 2023-10-26 DIAGNOSIS — Z87.891 PERSONAL HISTORY OF NICOTINE DEPENDENCE: ICD-10-CM

## 2023-10-26 DIAGNOSIS — F41.9 ANXIETY DISORDER, UNSPECIFIED: ICD-10-CM

## 2023-10-27 ENCOUNTER — APPOINTMENT (OUTPATIENT)
Dept: OTOLARYNGOLOGY | Facility: CLINIC | Age: 48
End: 2023-10-27
Payer: COMMERCIAL

## 2023-10-27 DIAGNOSIS — J39.8 OTHER SPECIFIED DISEASES OF UPPER RESPIRATORY TRACT: ICD-10-CM

## 2023-10-27 DIAGNOSIS — E89.0 POSTPROCEDURAL HYPOTHYROIDISM: ICD-10-CM

## 2023-10-27 PROCEDURE — 31615 TRCHEOBRNCHSC EST TRACHS INC: CPT

## 2023-10-27 PROCEDURE — 99213 OFFICE O/P EST LOW 20 MIN: CPT | Mod: 25

## 2023-11-13 ENCOUNTER — APPOINTMENT (OUTPATIENT)
Dept: OTOLARYNGOLOGY | Facility: CLINIC | Age: 48
End: 2023-11-13
Payer: COMMERCIAL

## 2023-11-13 VITALS
SYSTOLIC BLOOD PRESSURE: 123 MMHG | WEIGHT: 217 LBS | DIASTOLIC BLOOD PRESSURE: 81 MMHG | HEIGHT: 74 IN | HEART RATE: 82 BPM | OXYGEN SATURATION: 98 % | BODY MASS INDEX: 27.85 KG/M2 | TEMPERATURE: 98.2 F

## 2023-11-13 PROCEDURE — 99215 OFFICE O/P EST HI 40 MIN: CPT | Mod: 25

## 2023-11-13 PROCEDURE — 31525 DX LARYNGOSCOPY EXCL NB: CPT

## 2023-12-06 ENCOUNTER — APPOINTMENT (OUTPATIENT)
Dept: OTOLARYNGOLOGY | Facility: CLINIC | Age: 48
End: 2023-12-06
Payer: COMMERCIAL

## 2023-12-06 PROCEDURE — 99214 OFFICE O/P EST MOD 30 MIN: CPT | Mod: 25

## 2023-12-06 PROCEDURE — 31615 TRCHEOBRNCHSC EST TRACHS INC: CPT

## 2023-12-06 RX ORDER — METHYLPREDNISOLONE 4 MG/1
4 TABLET ORAL
Qty: 1 | Refills: 0 | Status: ACTIVE | COMMUNITY
Start: 2023-12-06 | End: 1900-01-01

## 2023-12-15 ENCOUNTER — APPOINTMENT (OUTPATIENT)
Dept: OTOLARYNGOLOGY | Facility: CLINIC | Age: 48
End: 2023-12-15
Payer: COMMERCIAL

## 2023-12-15 DIAGNOSIS — Z93.0 TRACHEOSTOMY STATUS: ICD-10-CM

## 2023-12-15 PROCEDURE — 99213 OFFICE O/P EST LOW 20 MIN: CPT | Mod: 25

## 2023-12-15 PROCEDURE — 31575 DIAGNOSTIC LARYNGOSCOPY: CPT

## 2023-12-15 NOTE — REASON FOR VISIT
[Subsequent Evaluation] : a subsequent evaluation for [FreeTextEntry2] :  tracheotomy removal with midl shortness of breath

## 2023-12-15 NOTE — HISTORY OF PRESENT ILLNESS
[de-identified] :     48y M post hemithyroidectomy and then completion thyroidectomy returns for follow up for decannulation. Patient overall stable and well. Pt has mild sob with heavy exercise. No noisy breathing.

## 2024-01-12 ENCOUNTER — APPOINTMENT (OUTPATIENT)
Dept: OTOLARYNGOLOGY | Facility: CLINIC | Age: 49
End: 2024-01-12

## 2024-01-19 ENCOUNTER — APPOINTMENT (OUTPATIENT)
Dept: OTOLARYNGOLOGY | Facility: CLINIC | Age: 49
End: 2024-01-19
Payer: COMMERCIAL

## 2024-01-19 DIAGNOSIS — J38.4 EDEMA OF LARYNX: ICD-10-CM

## 2024-01-19 PROCEDURE — 99213 OFFICE O/P EST LOW 20 MIN: CPT | Mod: 25

## 2024-01-19 PROCEDURE — 31575 DIAGNOSTIC LARYNGOSCOPY: CPT

## 2024-01-19 NOTE — PROCEDURE
[Normal] : posterior cricoid area had healthy pink mucosa in the interarytenoid area and the esophageal inlet [de-identified] : bilateral paresis, patent airway

## 2024-01-19 NOTE — HISTORY OF PRESENT ILLNESS
[de-identified] : 48y M s/p tracheotomy with vocal cord paresis. pt is breathing well and has no exertional dyspnea. Pt has no discomfort. Pt  feels like he has some deconditioning. Voice is improving every week but still has some hoarseness. Pt is getting back to normal llife.

## 2024-01-19 NOTE — REASON FOR VISIT
[Subsequent Evaluation] : a subsequent evaluation for [FreeTextEntry2] : Tracheotomy removal with midl shortness of breath.

## 2024-03-06 ENCOUNTER — APPOINTMENT (OUTPATIENT)
Dept: OTOLARYNGOLOGY | Facility: CLINIC | Age: 49
End: 2024-03-06
Payer: COMMERCIAL

## 2024-03-06 DIAGNOSIS — J38.00 PARALYSIS OF VOCAL CORDS AND LARYNX, UNSPECIFIED: ICD-10-CM

## 2024-03-06 DIAGNOSIS — R49.0 DYSPHONIA: ICD-10-CM

## 2024-03-06 DIAGNOSIS — J39.8 OTHER SPECIFIED DISEASES OF UPPER RESPIRATORY TRACT: ICD-10-CM

## 2024-03-06 PROCEDURE — 99213 OFFICE O/P EST LOW 20 MIN: CPT | Mod: 25

## 2024-03-06 PROCEDURE — 31575 DIAGNOSTIC LARYNGOSCOPY: CPT

## 2024-03-06 RX ORDER — FAMOTIDINE 20 MG/1
20 TABLET, FILM COATED ORAL
Qty: 30 | Refills: 3 | Status: ACTIVE | COMMUNITY
Start: 2023-07-14 | End: 1900-01-01

## 2024-03-06 NOTE — HISTORY OF PRESENT ILLNESS
[de-identified] : 49 y/o M being seen for follow for vocal cord paralysis. Pt is improving with SOB and stamina. Pt has some throat clearing and some reflux. Pt ran out of meds. Neck is healing well. Voice is improving  stated

## 2024-03-06 NOTE — PHYSICAL EXAM
[de-identified] : stoma is healing well, no air escape  [Normal] : mucosa is normal [Midline] : trachea located in midline position

## 2024-03-06 NOTE — PROCEDURE
[Hoarseness] : hoarseness not clearly evaluated by indirect laryngoscopy [None] : none [Flexible Endoscope] : examined with the flexible endoscope [True Vocal Cords Unilateral Paralysis] : true vocal cord paralysis on the right [Normal] : posterior cricoid area had healthy pink mucosa in the interarytenoid area and the esophageal inlet [de-identified] : left tvc with mobility improving

## 2024-03-14 ENCOUNTER — RX RENEWAL (OUTPATIENT)
Age: 49
End: 2024-03-14

## 2024-05-28 NOTE — DIETITIAN INITIAL EVALUATION ADULT. - CURRENT DIET ORDER MEETS ESTIMATED NUTRIENT REQUIREMENTS
DESHAWN TRUONG AWARE OF LOW HEART RATE. PATIENT DENIES SYMPTOMS; DESHAWN TRUONG TO EVALUATE. DESHAWN TRUONG EVALUATED AND OK TO PROCEED WITH PROCEDURE Statement Selected

## 2024-06-06 ENCOUNTER — RX RENEWAL (OUTPATIENT)
Age: 49
End: 2024-06-06

## 2024-06-06 RX ORDER — MONTELUKAST 10 MG/1
10 TABLET, FILM COATED ORAL DAILY
Qty: 90 | Refills: 0 | Status: ACTIVE | COMMUNITY
Start: 2023-08-11 | End: 1900-01-01

## 2024-07-02 NOTE — ASU PATIENT PROFILE, ADULT - PACKS YRS CALCULATION
Photo Preface (Leave Blank If You Do Not Want): Photographs were obtained today Detail Level: Zone 10

## 2024-07-03 ENCOUNTER — APPOINTMENT (OUTPATIENT)
Dept: OTOLARYNGOLOGY | Facility: CLINIC | Age: 49
End: 2024-07-03
Payer: COMMERCIAL

## 2024-07-03 DIAGNOSIS — J38.00 PARALYSIS OF VOCAL CORDS AND LARYNX, UNSPECIFIED: ICD-10-CM

## 2024-07-03 DIAGNOSIS — R49.0 DYSPHONIA: ICD-10-CM

## 2024-07-03 PROCEDURE — 99213 OFFICE O/P EST LOW 20 MIN: CPT | Mod: 25

## 2024-07-03 PROCEDURE — 31575 DIAGNOSTIC LARYNGOSCOPY: CPT

## 2024-07-18 ENCOUNTER — APPOINTMENT (OUTPATIENT)
Dept: OTOLARYNGOLOGY | Facility: CLINIC | Age: 49
End: 2024-07-18

## 2024-12-24 PROBLEM — F10.90 ALCOHOL USE: Status: ACTIVE | Noted: 2020-08-12

## 2025-01-15 ENCOUNTER — APPOINTMENT (OUTPATIENT)
Dept: OTOLARYNGOLOGY | Facility: CLINIC | Age: 50
End: 2025-01-15

## 2025-02-01 NOTE — PRE-OP CHECKLIST - BMI (KG/M2)
VSS. Pt de-lined per CTPA Ed. Pt tolerated de-lining. Attempted to get pt OOB. Pt b/p 78/68. POX 88%. Pt placed back to 6L NC. POX: 90%. Pt back to bed and resting. Will continue to monitor pt needs. 24.5

## 2025-03-28 NOTE — ED ADULT TRIAGE NOTE - BP NONINVASIVE SYSTOLIC (MM HG)
Discussed case with Dr Bob by phone at this time. Plan removal left leg external fixator today at 2pm. Pt is NPO   134

## 2025-06-13 NOTE — ED ADULT NURSE NOTE - NSFALLRSKASSESSTYPE_ED_ALL_ED
Copied from CRM #[id number]. Initial (On Arrival) Statement Selected

## (undated) DEVICE — DRSG TEGADERM 2.5X3"

## (undated) DEVICE — ELCTR STRYKER NEPTUNE SMOKE EVACUATION PENCIL (GREEN)

## (undated) DEVICE — SPONGE PEANUT AUTO COUNT

## (undated) DEVICE — ELCTR GROUNDING PAD ADULT COVIDIEN

## (undated) DEVICE — LIGASURE EXACT DISSECTOR

## (undated) DEVICE — DRAIN JACKSON PRATT 7MM FLAT FULL NO TROCAR

## (undated) DEVICE — SUT SILK 2-0 12-18"

## (undated) DEVICE — SUT CHROMIC 3-0 27" SH

## (undated) DEVICE — PACK UPPER BODY

## (undated) DEVICE — DRAPE TOWEL BLUE 17" X 24"

## (undated) DEVICE — DRAPE MAGNETIC INSTRUMENT MEDIUM

## (undated) DEVICE — SUT SILK 3-0 18" TIES

## (undated) DEVICE — DRAIN RESERVOIR FOR JACKSON PRATT 100CC CARDINAL

## (undated) DEVICE — Device

## (undated) DEVICE — BIPOLAR FORCEP SYMMETRY BAYONET STR 8.25" X 1.5MM (BLUE)

## (undated) DEVICE — SUT SILK 2-0 30" SH

## (undated) DEVICE — BIPOLAR FORCEP KIRWAN JEWELERS STR 4" X 0.4MM W 12FT CORD (GREEN)

## (undated) DEVICE — SUT SILK 2-0 30" PSL

## (undated) DEVICE — SUT PROLENE 4-0 18" PS-2

## (undated) DEVICE — GLV 7.5 PROTEXIS (WHITE)

## (undated) DEVICE — DRSG TEGADERM 4X4.75"

## (undated) DEVICE — SUT CHROMIC 4-0 27" SH-1